# Patient Record
Sex: FEMALE | Race: BLACK OR AFRICAN AMERICAN | Employment: OTHER | URBAN - METROPOLITAN AREA
[De-identification: names, ages, dates, MRNs, and addresses within clinical notes are randomized per-mention and may not be internally consistent; named-entity substitution may affect disease eponyms.]

---

## 2020-01-01 ENCOUNTER — ANESTHESIA (OUTPATIENT)
Dept: SURGERY | Age: 75
DRG: 823 | End: 2020-01-01
Payer: MEDICARE

## 2020-01-01 ENCOUNTER — APPOINTMENT (OUTPATIENT)
Dept: GENERAL RADIOLOGY | Age: 75
DRG: 823 | End: 2020-01-01
Attending: INTERNAL MEDICINE
Payer: MEDICARE

## 2020-01-01 ENCOUNTER — APPOINTMENT (OUTPATIENT)
Dept: GENERAL RADIOLOGY | Age: 75
DRG: 823 | End: 2020-01-01
Attending: HOSPITALIST
Payer: MEDICARE

## 2020-01-01 ENCOUNTER — HOSPITAL ENCOUNTER (INPATIENT)
Age: 75
LOS: 10 days | DRG: 823 | End: 2020-11-09
Attending: EMERGENCY MEDICINE | Admitting: FAMILY MEDICINE
Payer: MEDICARE

## 2020-01-01 ENCOUNTER — APPOINTMENT (OUTPATIENT)
Dept: GENERAL RADIOLOGY | Age: 75
DRG: 823 | End: 2020-01-01
Payer: MEDICARE

## 2020-01-01 ENCOUNTER — APPOINTMENT (OUTPATIENT)
Dept: GENERAL RADIOLOGY | Age: 75
DRG: 823 | End: 2020-01-01
Attending: STUDENT IN AN ORGANIZED HEALTH CARE EDUCATION/TRAINING PROGRAM
Payer: MEDICARE

## 2020-01-01 ENCOUNTER — APPOINTMENT (OUTPATIENT)
Dept: GENERAL RADIOLOGY | Age: 75
DRG: 823 | End: 2020-01-01
Attending: ANESTHESIOLOGY
Payer: MEDICARE

## 2020-01-01 ENCOUNTER — APPOINTMENT (OUTPATIENT)
Dept: GENERAL RADIOLOGY | Age: 75
DRG: 823 | End: 2020-01-01
Attending: EMERGENCY MEDICINE
Payer: MEDICARE

## 2020-01-01 ENCOUNTER — APPOINTMENT (OUTPATIENT)
Dept: NON INVASIVE DIAGNOSTICS | Age: 75
DRG: 823 | End: 2020-01-01
Attending: FAMILY MEDICINE
Payer: MEDICARE

## 2020-01-01 ENCOUNTER — ANESTHESIA EVENT (OUTPATIENT)
Dept: SURGERY | Age: 75
DRG: 823 | End: 2020-01-01
Payer: MEDICARE

## 2020-01-01 ENCOUNTER — APPOINTMENT (OUTPATIENT)
Dept: CT IMAGING | Age: 75
DRG: 823 | End: 2020-01-01
Attending: EMERGENCY MEDICINE
Payer: MEDICARE

## 2020-01-01 ENCOUNTER — DOCUMENTATION ONLY (OUTPATIENT)
Dept: ONCOLOGY | Age: 75
End: 2020-01-01

## 2020-01-01 ENCOUNTER — APPOINTMENT (OUTPATIENT)
Dept: GENERAL RADIOLOGY | Age: 75
DRG: 823 | End: 2020-01-01
Attending: THORACIC SURGERY (CARDIOTHORACIC VASCULAR SURGERY)
Payer: MEDICARE

## 2020-01-01 ENCOUNTER — APPOINTMENT (OUTPATIENT)
Dept: GENERAL RADIOLOGY | Age: 75
DRG: 823 | End: 2020-01-01
Attending: NURSE PRACTITIONER
Payer: MEDICARE

## 2020-01-01 VITALS
OXYGEN SATURATION: 96 % | DIASTOLIC BLOOD PRESSURE: 57 MMHG | HEART RATE: 115 BPM | TEMPERATURE: 97.6 F | SYSTOLIC BLOOD PRESSURE: 98 MMHG | HEIGHT: 60 IN | WEIGHT: 109.79 LBS | RESPIRATION RATE: 16 BRPM | BODY MASS INDEX: 21.55 KG/M2

## 2020-01-01 DIAGNOSIS — R06.02 SHORTNESS OF BREATH: ICD-10-CM

## 2020-01-01 DIAGNOSIS — R53.81 PHYSICAL DEBILITY: ICD-10-CM

## 2020-01-01 DIAGNOSIS — J90 PLEURAL EFFUSION: Primary | ICD-10-CM

## 2020-01-01 DIAGNOSIS — R63.0 ANOREXIA: ICD-10-CM

## 2020-01-01 DIAGNOSIS — C83.10 MANTLE CELL LYMPHOMA, UNSPECIFIED BODY REGION (HCC): ICD-10-CM

## 2020-01-01 DIAGNOSIS — R64 CACHEXIA (HCC): ICD-10-CM

## 2020-01-01 DIAGNOSIS — R09.02 HYPOXIA: ICD-10-CM

## 2020-01-01 DIAGNOSIS — D72.829 LEUKOCYTOSIS, UNSPECIFIED TYPE: ICD-10-CM

## 2020-01-01 DIAGNOSIS — D64.9 ANEMIA, UNSPECIFIED TYPE: ICD-10-CM

## 2020-01-01 DIAGNOSIS — R59.1 LYMPHADENOPATHY: ICD-10-CM

## 2020-01-01 LAB
ABO + RH BLD: NORMAL
ALBUMIN SERPL-MCNC: 1.6 G/DL (ref 3.5–5)
ALBUMIN SERPL-MCNC: 1.8 G/DL (ref 3.5–5)
ALBUMIN SERPL-MCNC: 1.8 G/DL (ref 3.5–5)
ALBUMIN SERPL-MCNC: 2.2 G/DL (ref 3.5–5)
ALBUMIN SERPL-MCNC: 2.3 G/DL (ref 3.5–5)
ALBUMIN SERPL-MCNC: 2.7 G/DL (ref 3.5–5)
ALBUMIN/GLOB SERPL: 0.6 {RATIO} (ref 1.1–2.2)
ALBUMIN/GLOB SERPL: 0.8 {RATIO} (ref 1.1–2.2)
ALP SERPL-CCNC: 105 U/L (ref 45–117)
ALP SERPL-CCNC: 122 U/L (ref 45–117)
ALP SERPL-CCNC: 81 U/L (ref 45–117)
ALP SERPL-CCNC: 83 U/L (ref 45–117)
ALP SERPL-CCNC: 87 U/L (ref 45–117)
ALP SERPL-CCNC: 87 U/L (ref 45–117)
ALT SERPL-CCNC: 10 U/L (ref 12–78)
ALT SERPL-CCNC: 12 U/L (ref 12–78)
ALT SERPL-CCNC: 14 U/L (ref 12–78)
ALT SERPL-CCNC: 14 U/L (ref 12–78)
ALT SERPL-CCNC: 15 U/L (ref 12–78)
ALT SERPL-CCNC: 9 U/L (ref 12–78)
ANION GAP SERPL CALC-SCNC: 2 MMOL/L (ref 5–15)
ANION GAP SERPL CALC-SCNC: 4 MMOL/L (ref 5–15)
ANION GAP SERPL CALC-SCNC: 4 MMOL/L (ref 5–15)
ANION GAP SERPL CALC-SCNC: 6 MMOL/L (ref 5–15)
ANION GAP SERPL CALC-SCNC: 7 MMOL/L (ref 5–15)
ANION GAP SERPL CALC-SCNC: 9 MMOL/L (ref 5–15)
APTT PPP: 34.4 SEC (ref 22.1–32)
AST SERPL-CCNC: 116 U/L (ref 15–37)
AST SERPL-CCNC: 25 U/L (ref 15–37)
AST SERPL-CCNC: 26 U/L (ref 15–37)
AST SERPL-CCNC: 85 U/L (ref 15–37)
ATRIAL RATE: 108 BPM
BACTERIA SPEC CULT: NORMAL
BASOPHILS # BLD: 0 K/UL (ref 0–0.1)
BASOPHILS # BLD: NORMAL K/UL
BASOPHILS NFR BLD: 0 % (ref 0–1)
BASOPHILS NFR BLD: NORMAL % (ref 0–1)
BILIRUB SERPL-MCNC: 0.3 MG/DL (ref 0.2–1)
BILIRUB SERPL-MCNC: 0.3 MG/DL (ref 0.2–1)
BILIRUB SERPL-MCNC: 0.4 MG/DL (ref 0.2–1)
BILIRUB SERPL-MCNC: 0.5 MG/DL (ref 0.2–1)
BILIRUB SERPL-MCNC: 0.6 MG/DL (ref 0.2–1)
BILIRUB SERPL-MCNC: 0.7 MG/DL (ref 0.2–1)
BLD PROD TYP BPU: NORMAL
BLOOD GROUP ANTIBODIES SERPL: NORMAL
BNP SERPL-MCNC: 302 PG/ML
BPU ID: NORMAL
BUN SERPL-MCNC: 19 MG/DL (ref 6–20)
BUN SERPL-MCNC: 22 MG/DL (ref 6–20)
BUN SERPL-MCNC: 24 MG/DL (ref 6–20)
BUN SERPL-MCNC: 26 MG/DL (ref 6–20)
BUN SERPL-MCNC: 29 MG/DL (ref 6–20)
BUN SERPL-MCNC: 38 MG/DL (ref 6–20)
BUN SERPL-MCNC: 38 MG/DL (ref 6–20)
BUN SERPL-MCNC: 39 MG/DL (ref 6–20)
BUN/CREAT SERPL: 26 (ref 12–20)
BUN/CREAT SERPL: 33 (ref 12–20)
BUN/CREAT SERPL: 35 (ref 12–20)
BUN/CREAT SERPL: 35 (ref 12–20)
BUN/CREAT SERPL: 36 (ref 12–20)
BUN/CREAT SERPL: 37 (ref 12–20)
BUN/CREAT SERPL: 40 (ref 12–20)
BUN/CREAT SERPL: 55 (ref 12–20)
BUN/CREAT SERPL: 64 (ref 12–20)
BUN/CREAT SERPL: 67 (ref 12–20)
CALCIUM SERPL-MCNC: 6.8 MG/DL (ref 8.5–10.1)
CALCIUM SERPL-MCNC: 6.9 MG/DL (ref 8.5–10.1)
CALCIUM SERPL-MCNC: 7.1 MG/DL (ref 8.5–10.1)
CALCIUM SERPL-MCNC: 7.3 MG/DL (ref 8.5–10.1)
CALCIUM SERPL-MCNC: 7.3 MG/DL (ref 8.5–10.1)
CALCIUM SERPL-MCNC: 7.4 MG/DL (ref 8.5–10.1)
CALCIUM SERPL-MCNC: 7.5 MG/DL (ref 8.5–10.1)
CALCIUM SERPL-MCNC: 7.5 MG/DL (ref 8.5–10.1)
CALCIUM SERPL-MCNC: 8 MG/DL (ref 8.5–10.1)
CALCIUM SERPL-MCNC: 8.5 MG/DL (ref 8.5–10.1)
CALCULATED P AXIS, ECG09: 50 DEGREES
CALCULATED R AXIS, ECG10: 49 DEGREES
CALCULATED T AXIS, ECG11: 49 DEGREES
CHLORIDE SERPL-SCNC: 106 MMOL/L (ref 97–108)
CHLORIDE SERPL-SCNC: 111 MMOL/L (ref 97–108)
CHLORIDE SERPL-SCNC: 113 MMOL/L (ref 97–108)
CHLORIDE SERPL-SCNC: 113 MMOL/L (ref 97–108)
CHLORIDE SERPL-SCNC: 114 MMOL/L (ref 97–108)
CHLORIDE SERPL-SCNC: 114 MMOL/L (ref 97–108)
CHLORIDE SERPL-SCNC: 115 MMOL/L (ref 97–108)
CHLORIDE SERPL-SCNC: 115 MMOL/L (ref 97–108)
CHLORIDE SERPL-SCNC: 118 MMOL/L (ref 97–108)
CHLORIDE SERPL-SCNC: 118 MMOL/L (ref 97–108)
CO2 SERPL-SCNC: 22 MMOL/L (ref 21–32)
CO2 SERPL-SCNC: 23 MMOL/L (ref 21–32)
CO2 SERPL-SCNC: 25 MMOL/L (ref 21–32)
CO2 SERPL-SCNC: 26 MMOL/L (ref 21–32)
CO2 SERPL-SCNC: 27 MMOL/L (ref 21–32)
CO2 SERPL-SCNC: 27 MMOL/L (ref 21–32)
CO2 SERPL-SCNC: 28 MMOL/L (ref 21–32)
CO2 SERPL-SCNC: 30 MMOL/L (ref 21–32)
COMMENT, HOLDF: NORMAL
COMMENT, HOLDF: NORMAL
CORTIS SERPL-MCNC: 37.9 UG/DL
CREAT SERPL-MCNC: 0.57 MG/DL (ref 0.55–1.02)
CREAT SERPL-MCNC: 0.59 MG/DL (ref 0.55–1.02)
CREAT SERPL-MCNC: 0.61 MG/DL (ref 0.55–1.02)
CREAT SERPL-MCNC: 0.62 MG/DL (ref 0.55–1.02)
CREAT SERPL-MCNC: 0.66 MG/DL (ref 0.55–1.02)
CREAT SERPL-MCNC: 0.66 MG/DL (ref 0.55–1.02)
CREAT SERPL-MCNC: 0.69 MG/DL (ref 0.55–1.02)
CREAT SERPL-MCNC: 0.72 MG/DL (ref 0.55–1.02)
CREAT SERPL-MCNC: 0.72 MG/DL (ref 0.55–1.02)
CREAT SERPL-MCNC: 0.75 MG/DL (ref 0.55–1.02)
CROSSMATCH RESULT,%XM: NORMAL
DIAGNOSIS, 93000: NORMAL
DIFFERENTIAL METHOD BLD: ABNORMAL
DIFFERENTIAL METHOD BLD: NORMAL
ECHO AO ROOT DIAM: 2.35 CM
ECHO AV AREA PEAK VELOCITY: 1.14 CM2
ECHO AV PEAK GRADIENT: 10.15 MMHG
ECHO AV PEAK VELOCITY: 159.27 CM/S
ECHO EST RA PRESSURE: 12 MMHG
ECHO IVC PROX: 1.57 CM
ECHO LA AREA 4C: 24.46 CM2
ECHO LA MAJOR AXIS: 3.66 CM
ECHO LA VOL 2C: 46.23 ML (ref 22–52)
ECHO LA VOL 4C: 74.51 ML (ref 22–52)
ECHO LA VOL BP: 65.41 ML (ref 22–52)
ECHO LV EDV A2C: 73.54 ML
ECHO LV EDV A4C: 55.81 ML
ECHO LV EDV BP: 69.56 ML (ref 56–104)
ECHO LV EJECTION FRACTION A2C: 50 PERCENT
ECHO LV EJECTION FRACTION A4C: 54 PERCENT
ECHO LV EJECTION FRACTION BIPLANE: 49.2 PERCENT (ref 55–100)
ECHO LV ESV A2C: 36.63 ML
ECHO LV ESV A4C: 25.41 ML
ECHO LV ESV BP: 35.33 ML (ref 19–49)
ECHO LV INTERNAL DIMENSION DIASTOLIC: 4.77 CM (ref 3.9–5.3)
ECHO LV INTERNAL DIMENSION SYSTOLIC: 3.31 CM
ECHO LV IVSD: 0.6 CM (ref 0.6–0.9)
ECHO LV MASS 2D: 85.6 G (ref 67–162)
ECHO LV POSTERIOR WALL DIASTOLIC: 0.58 CM (ref 0.6–0.9)
ECHO LVOT DIAM: 1.44 CM
ECHO LVOT PEAK GRADIENT: 5.08 MMHG
ECHO LVOT PEAK VELOCITY: 112.67 CM/S
ECHO MV A VELOCITY: 107.2 CM/S
ECHO MV AREA PHT: 3.94 CM2
ECHO MV E DECELERATION TIME (DT): 192.34 MS
ECHO MV E VELOCITY: 77.65 CM/S
ECHO MV E/A RATIO: 0.72
ECHO MV PRESSURE HALF TIME (PHT): 55.78 MS
ECHO PV PEAK INSTANTANEOUS GRADIENT SYSTOLIC: 3.05 MMHG
ECHO RIGHT VENTRICULAR SYSTOLIC PRESSURE (RVSP): 70.2 MMHG
ECHO RV INTERNAL DIMENSION: 3.27 CM
ECHO RV TAPSE: 1.65 CM (ref 1.5–2)
ECHO TV REGURGITANT MAX VELOCITY: 381.46 CM/S
ECHO TV REGURGITANT PEAK GRADIENT: 58.2 MMHG
EOSINOPHIL # BLD: 0 K/UL (ref 0–0.4)
EOSINOPHIL # BLD: NORMAL K/UL
EOSINOPHIL NFR BLD: 0 % (ref 0–7)
EOSINOPHIL NFR BLD: NORMAL % (ref 0–5)
ERYTHROCYTE [DISTWIDTH] IN BLOOD BY AUTOMATED COUNT: 18.5 % (ref 11.5–14.5)
ERYTHROCYTE [DISTWIDTH] IN BLOOD BY AUTOMATED COUNT: 18.7 % (ref 11.5–14.5)
ERYTHROCYTE [DISTWIDTH] IN BLOOD BY AUTOMATED COUNT: 19 % (ref 11.5–14.5)
ERYTHROCYTE [DISTWIDTH] IN BLOOD BY AUTOMATED COUNT: 19.2 % (ref 11.5–14.5)
ERYTHROCYTE [DISTWIDTH] IN BLOOD BY AUTOMATED COUNT: 19.2 % (ref 11.5–14.5)
ERYTHROCYTE [DISTWIDTH] IN BLOOD BY AUTOMATED COUNT: 19.4 % (ref 11.5–14.5)
ERYTHROCYTE [DISTWIDTH] IN BLOOD BY AUTOMATED COUNT: 19.7 % (ref 11.5–14.5)
ERYTHROCYTE [DISTWIDTH] IN BLOOD BY AUTOMATED COUNT: 19.8 % (ref 11.5–14.5)
ERYTHROCYTE [DISTWIDTH] IN BLOOD BY AUTOMATED COUNT: 19.9 % (ref 11.5–14.5)
ERYTHROCYTE [DISTWIDTH] IN BLOOD BY AUTOMATED COUNT: 20.8 % (ref 11.5–14.5)
ERYTHROCYTE [DISTWIDTH] IN BLOOD BY AUTOMATED COUNT: NORMAL % (ref 11.5–14.5)
FERRITIN SERPL-MCNC: 125 NG/ML (ref 8–252)
FIBRINOGEN PPP-MCNC: 220 MG/DL (ref 200–475)
FOLATE SERPL-MCNC: 15.5 NG/ML (ref 5–21)
GLOBULIN SER CALC-MCNC: 2.9 G/DL (ref 2–4)
GLOBULIN SER CALC-MCNC: 2.9 G/DL (ref 2–4)
GLOBULIN SER CALC-MCNC: 3.2 G/DL (ref 2–4)
GLOBULIN SER CALC-MCNC: 3.2 G/DL (ref 2–4)
GLOBULIN SER CALC-MCNC: 3.6 G/DL (ref 2–4)
GLOBULIN SER CALC-MCNC: 4.3 G/DL (ref 2–4)
GLUCOSE BLD STRIP.AUTO-MCNC: 102 MG/DL (ref 65–100)
GLUCOSE SERPL-MCNC: 106 MG/DL (ref 65–100)
GLUCOSE SERPL-MCNC: 115 MG/DL (ref 65–100)
GLUCOSE SERPL-MCNC: 125 MG/DL (ref 65–100)
GLUCOSE SERPL-MCNC: 136 MG/DL (ref 65–100)
GLUCOSE SERPL-MCNC: 65 MG/DL (ref 65–100)
GLUCOSE SERPL-MCNC: 73 MG/DL (ref 65–100)
GLUCOSE SERPL-MCNC: 83 MG/DL (ref 65–100)
GLUCOSE SERPL-MCNC: 84 MG/DL (ref 65–100)
GLUCOSE SERPL-MCNC: 86 MG/DL (ref 65–100)
GLUCOSE SERPL-MCNC: 93 MG/DL (ref 65–100)
HBV CORE AB SERPL QL IA: NEGATIVE
HBV SURFACE AB SER QL: REACTIVE
HBV SURFACE AB SER-ACNC: 117.1 MIU/ML
HBV SURFACE AG SER QL: <0.1 INDEX
HBV SURFACE AG SER QL: NEGATIVE
HCT VFR BLD AUTO: 23.6 % (ref 35–47)
HCT VFR BLD AUTO: 24.5 % (ref 35–47)
HCT VFR BLD AUTO: 25.6 % (ref 35–47)
HCT VFR BLD AUTO: 25.9 % (ref 35–47)
HCT VFR BLD AUTO: 27.6 % (ref 35–47)
HCT VFR BLD AUTO: 29.3 % (ref 35–47)
HCT VFR BLD AUTO: 30.1 % (ref 35–47)
HCT VFR BLD AUTO: 30.3 % (ref 35–47)
HCT VFR BLD AUTO: 31.2 % (ref 35–47)
HCT VFR BLD AUTO: 31.6 % (ref 35–47)
HCT VFR BLD AUTO: 31.6 % (ref 35–47)
HCT VFR BLD AUTO: 31.7 % (ref 35–47)
HCT VFR BLD AUTO: NORMAL %
HCV AB SERPL QL IA: NONREACTIVE
HCV COMMENT,HCGAC: NORMAL
HGB BLD-MCNC: 6.4 G/DL (ref 11.5–16)
HGB BLD-MCNC: 6.7 G/DL (ref 11.5–16)
HGB BLD-MCNC: 6.9 G/DL (ref 11.5–16)
HGB BLD-MCNC: 7.3 G/DL (ref 11.5–16)
HGB BLD-MCNC: 8.1 G/DL (ref 11.5–16)
HGB BLD-MCNC: 8.1 G/DL (ref 11.5–16)
HGB BLD-MCNC: 8.5 G/DL (ref 11.5–16)
HGB BLD-MCNC: 8.7 G/DL (ref 11.5–16)
HGB BLD-MCNC: 8.7 G/DL (ref 11.5–16)
HGB BLD-MCNC: 8.8 G/DL (ref 11.5–16)
HGB BLD-MCNC: 8.9 G/DL (ref 11.5–16)
HGB BLD-MCNC: 8.9 G/DL (ref 11.5–16)
HGB BLD-MCNC: NORMAL G/DL (ref 12–16)
HISTORY CHECKED?,CKHIST: NORMAL
IMM GRANULOCYTES # BLD AUTO: 0 K/UL
IMM GRANULOCYTES # BLD AUTO: NORMAL K/UL (ref 0–0.5)
IMM GRANULOCYTES NFR BLD AUTO: 0 %
IMM GRANULOCYTES NFR BLD AUTO: NORMAL % (ref 0–5)
INR PPP: 1.4 (ref 0.9–1.1)
IRON SATN MFR SERPL: 7 % (ref 20–50)
IRON SERPL-MCNC: 15 UG/DL (ref 35–150)
LACTATE SERPL-SCNC: 1.2 MMOL/L (ref 0.4–2)
LACTATE SERPL-SCNC: 1.4 MMOL/L (ref 0.4–2)
LACTATE SERPL-SCNC: 1.9 MMOL/L (ref 0.4–2)
LDH SERPL L TO P-CCNC: 321 U/L (ref 81–246)
LYMPHOCYTES # BLD: 11.4 K/UL (ref 0.8–3.5)
LYMPHOCYTES # BLD: 12.8 K/UL (ref 0.8–3.5)
LYMPHOCYTES # BLD: 13.9 K/UL (ref 0.8–3.5)
LYMPHOCYTES # BLD: 17.7 K/UL (ref 0.8–3.5)
LYMPHOCYTES # BLD: 18.3 K/UL (ref 0.8–3.5)
LYMPHOCYTES # BLD: 19.3 K/UL (ref 0.8–3.5)
LYMPHOCYTES # BLD: 2.6 K/UL (ref 0.8–3.5)
LYMPHOCYTES # BLD: 26.7 K/UL (ref 0.8–3.5)
LYMPHOCYTES # BLD: 27.3 K/UL (ref 0.8–3.5)
LYMPHOCYTES # BLD: 36.3 K/UL (ref 0.8–3.5)
LYMPHOCYTES # BLD: 37.5 K/UL (ref 0.8–3.5)
LYMPHOCYTES # BLD: NORMAL K/UL
LYMPHOCYTES NFR BLD: 49 % (ref 12–49)
LYMPHOCYTES NFR BLD: 61 % (ref 12–49)
LYMPHOCYTES NFR BLD: 67 % (ref 12–49)
LYMPHOCYTES NFR BLD: 68 % (ref 12–49)
LYMPHOCYTES NFR BLD: 74 % (ref 12–49)
LYMPHOCYTES NFR BLD: 80 % (ref 12–49)
LYMPHOCYTES NFR BLD: 83 % (ref 12–49)
LYMPHOCYTES NFR BLD: 83 % (ref 12–49)
LYMPHOCYTES NFR BLD: 84 % (ref 12–49)
LYMPHOCYTES NFR BLD: 89 % (ref 12–49)
LYMPHOCYTES NFR BLD: 98 % (ref 12–49)
LYMPHOCYTES NFR BLD: NORMAL % (ref 12–49)
MAGNESIUM SERPL-MCNC: 1.9 MG/DL (ref 1.6–2.4)
MAGNESIUM SERPL-MCNC: 2 MG/DL (ref 1.6–2.4)
MAGNESIUM SERPL-MCNC: 2 MG/DL (ref 1.6–2.4)
MCH RBC QN AUTO: 22.9 PG (ref 26–34)
MCH RBC QN AUTO: 24 PG (ref 26–34)
MCH RBC QN AUTO: 24.4 PG (ref 26–34)
MCH RBC QN AUTO: 24.7 PG (ref 26–34)
MCH RBC QN AUTO: 24.8 PG (ref 26–34)
MCH RBC QN AUTO: 24.8 PG (ref 26–34)
MCH RBC QN AUTO: 24.9 PG (ref 26–34)
MCH RBC QN AUTO: 24.9 PG (ref 26–34)
MCH RBC QN AUTO: 25.1 PG (ref 26–34)
MCH RBC QN AUTO: NORMAL PG (ref 25–35)
MCHC RBC AUTO-ENTMCNC: 26.6 G/DL (ref 30–36.5)
MCHC RBC AUTO-ENTMCNC: 27.1 G/DL (ref 30–36.5)
MCHC RBC AUTO-ENTMCNC: 27.3 G/DL (ref 30–36.5)
MCHC RBC AUTO-ENTMCNC: 27.5 G/DL (ref 30–36.5)
MCHC RBC AUTO-ENTMCNC: 27.6 G/DL (ref 30–36.5)
MCHC RBC AUTO-ENTMCNC: 28.1 G/DL (ref 30–36.5)
MCHC RBC AUTO-ENTMCNC: 28.2 G/DL (ref 30–36.5)
MCHC RBC AUTO-ENTMCNC: 28.5 G/DL (ref 30–36.5)
MCHC RBC AUTO-ENTMCNC: 28.7 G/DL (ref 30–36.5)
MCHC RBC AUTO-ENTMCNC: 29.3 G/DL (ref 30–36.5)
MCHC RBC AUTO-ENTMCNC: NORMAL G/DL (ref 31–37)
MCV RBC AUTO: 84.1 FL (ref 80–99)
MCV RBC AUTO: 84.6 FL (ref 80–99)
MCV RBC AUTO: 86.8 FL (ref 80–99)
MCV RBC AUTO: 87.3 FL (ref 80–99)
MCV RBC AUTO: 87.6 FL (ref 80–99)
MCV RBC AUTO: 87.8 FL (ref 80–99)
MCV RBC AUTO: 87.8 FL (ref 80–99)
MCV RBC AUTO: 88 FL (ref 80–99)
MCV RBC AUTO: 88.3 FL (ref 80–99)
MCV RBC AUTO: 89.9 FL (ref 80–99)
MCV RBC AUTO: 90.3 FL (ref 80–99)
MCV RBC AUTO: 91.5 FL (ref 80–99)
MCV RBC AUTO: NORMAL FL (ref 78–102)
METAMYELOCYTES NFR BLD MANUAL: 1 %
MONOCYTES # BLD: 0 K/UL (ref 0–1)
MONOCYTES # BLD: 0 K/UL (ref 0–1)
MONOCYTES # BLD: 0.2 K/UL (ref 0–1)
MONOCYTES # BLD: 0.3 K/UL (ref 0–1)
MONOCYTES # BLD: 0.5 K/UL (ref 0–1)
MONOCYTES # BLD: 0.6 K/UL (ref 0–1)
MONOCYTES # BLD: 2.2 K/UL (ref 0–1)
MONOCYTES # BLD: 2.3 K/UL (ref 0–1)
MONOCYTES # BLD: NORMAL K/UL
MONOCYTES NFR BLD: 0 % (ref 5–13)
MONOCYTES NFR BLD: 0 % (ref 5–13)
MONOCYTES NFR BLD: 1 % (ref 5–13)
MONOCYTES NFR BLD: 2 % (ref 5–13)
MONOCYTES NFR BLD: 3 % (ref 5–13)
MONOCYTES NFR BLD: 3 % (ref 5–13)
MONOCYTES NFR BLD: 5 % (ref 5–13)
MONOCYTES NFR BLD: 5 % (ref 5–13)
MONOCYTES NFR BLD: NORMAL % (ref 5–13)
NEUTS BAND NFR BLD MANUAL: 1 % (ref 0–6)
NEUTS BAND NFR BLD MANUAL: 1 % (ref 0–6)
NEUTS BAND NFR BLD MANUAL: 2 % (ref 0–6)
NEUTS BAND NFR BLD MANUAL: 2 % (ref 0–6)
NEUTS BAND NFR BLD MANUAL: 3 % (ref 0–6)
NEUTS BAND NFR BLD MANUAL: 4 % (ref 0–6)
NEUTS SEG # BLD: 0.5 K/UL (ref 1.8–8)
NEUTS SEG # BLD: 2 K/UL (ref 1.8–8)
NEUTS SEG # BLD: 2.6 K/UL (ref 1.8–8)
NEUTS SEG # BLD: 3.7 K/UL (ref 1.8–8)
NEUTS SEG # BLD: 3.9 K/UL (ref 1.8–8)
NEUTS SEG # BLD: 5.3 K/UL (ref 1.8–8)
NEUTS SEG # BLD: 5.4 K/UL (ref 1.8–8)
NEUTS SEG # BLD: 6.4 K/UL (ref 1.8–8)
NEUTS SEG # BLD: 6.5 K/UL (ref 1.8–8)
NEUTS SEG # BLD: 7.6 K/UL (ref 1.8–8)
NEUTS SEG # BLD: 8 K/UL (ref 1.8–8)
NEUTS SEG # BLD: NORMAL K/UL
NEUTS SEG NFR BLD: 10 % (ref 32–75)
NEUTS SEG NFR BLD: 15 % (ref 32–75)
NEUTS SEG NFR BLD: 19 % (ref 32–75)
NEUTS SEG NFR BLD: 2 % (ref 32–75)
NEUTS SEG NFR BLD: 25 % (ref 32–75)
NEUTS SEG NFR BLD: 30 % (ref 32–75)
NEUTS SEG NFR BLD: 30 % (ref 32–75)
NEUTS SEG NFR BLD: 33 % (ref 32–75)
NEUTS SEG NFR BLD: 44 % (ref 32–75)
NEUTS SEG NFR BLD: NORMAL % (ref 42–75)
NRBC # BLD: 0.06 K/UL (ref 0–0.01)
NRBC # BLD: 0.08 K/UL (ref 0–0.01)
NRBC # BLD: 0.09 K/UL (ref 0–0.01)
NRBC # BLD: 0.09 K/UL (ref 0–0.01)
NRBC # BLD: 0.13 K/UL (ref 0–0.01)
NRBC # BLD: 0.19 K/UL (ref 0–0.01)
NRBC # BLD: 0.22 K/UL (ref 0–0.01)
NRBC # BLD: 0.23 K/UL (ref 0–0.01)
NRBC # BLD: 0.3 K/UL (ref 0–0.01)
NRBC # BLD: 0.46 K/UL (ref 0–0.01)
NRBC # BLD: 0.62 K/UL (ref 0–0.01)
NRBC # BLD: 0.66 K/UL (ref 0–0.01)
NRBC # BLD: NORMAL K/UL
NRBC BLD-RTO: 0.4 PER 100 WBC
NRBC BLD-RTO: 0.4 PER 100 WBC
NRBC BLD-RTO: 0.6 PER 100 WBC
NRBC BLD-RTO: 0.6 PER 100 WBC
NRBC BLD-RTO: 0.7 PER 100 WBC
NRBC BLD-RTO: 0.7 PER 100 WBC
NRBC BLD-RTO: 0.8 PER 100 WBC
NRBC BLD-RTO: 1.3 PER 100 WBC
NRBC BLD-RTO: 1.4 PER 100 WBC
NRBC BLD-RTO: 1.5 PER 100 WBC
NRBC BLD-RTO: 1.7 PER 100 WBC
NRBC BLD-RTO: 2.2 PER 100 WBC
NRBC BLD-RTO: NORMAL PER 100 WBC
P-R INTERVAL, ECG05: 124 MS
PATH REV BLD -IMP: ABNORMAL
PHOSPHATE SERPL-MCNC: 3 MG/DL (ref 2.6–4.7)
PHOSPHATE SERPL-MCNC: 3.7 MG/DL (ref 2.6–4.7)
PHOSPHATE SERPL-MCNC: 3.9 MG/DL (ref 2.6–4.7)
PLATELET # BLD AUTO: 26 K/UL (ref 150–400)
PLATELET # BLD AUTO: 29 K/UL (ref 150–400)
PLATELET # BLD AUTO: 31 K/UL (ref 150–400)
PLATELET # BLD AUTO: 33 K/UL (ref 150–400)
PLATELET # BLD AUTO: 40 K/UL (ref 150–400)
PLATELET # BLD AUTO: 45 K/UL (ref 150–400)
PLATELET # BLD AUTO: 55 K/UL (ref 150–400)
PLATELET # BLD AUTO: 56 K/UL (ref 150–400)
PLATELET # BLD AUTO: 58 K/UL (ref 150–400)
PLATELET # BLD AUTO: 6 K/UL (ref 150–400)
PLATELET # BLD AUTO: 68 K/UL (ref 150–400)
PLATELET # BLD AUTO: 69 K/UL (ref 150–400)
PLATELET # BLD AUTO: 69 K/UL (ref 150–400)
PLATELET # BLD AUTO: NORMAL K/UL
PMV BLD AUTO: 10 FL (ref 8.9–12.9)
PMV BLD AUTO: 10.2 FL (ref 8.9–12.9)
PMV BLD AUTO: 9.2 FL (ref 8.9–12.9)
PMV BLD AUTO: 9.3 FL (ref 8.9–12.9)
PMV BLD AUTO: 9.6 FL (ref 8.9–12.9)
PMV BLD AUTO: 9.7 FL (ref 8.9–12.9)
PMV BLD AUTO: 9.7 FL (ref 8.9–12.9)
PMV BLD AUTO: 9.8 FL (ref 8.9–12.9)
PMV BLD AUTO: 9.9 FL (ref 8.9–12.9)
PMV BLD AUTO: NORMAL FL (ref 7.4–10.4)
POTASSIUM SERPL-SCNC: 3.4 MMOL/L (ref 3.5–5.1)
POTASSIUM SERPL-SCNC: 3.5 MMOL/L (ref 3.5–5.1)
POTASSIUM SERPL-SCNC: 3.8 MMOL/L (ref 3.5–5.1)
POTASSIUM SERPL-SCNC: 3.9 MMOL/L (ref 3.5–5.1)
POTASSIUM SERPL-SCNC: 4.1 MMOL/L (ref 3.5–5.1)
POTASSIUM SERPL-SCNC: 4.5 MMOL/L (ref 3.5–5.1)
PROT SERPL-MCNC: 4.5 G/DL (ref 6.4–8.2)
PROT SERPL-MCNC: 5 G/DL (ref 6.4–8.2)
PROT SERPL-MCNC: 5 G/DL (ref 6.4–8.2)
PROT SERPL-MCNC: 5.2 G/DL (ref 6.4–8.2)
PROT SERPL-MCNC: 5.8 G/DL (ref 6.4–8.2)
PROT SERPL-MCNC: 7 G/DL (ref 6.4–8.2)
PROTHROMBIN TIME: 14.1 SEC (ref 9–11.1)
Q-T INTERVAL, ECG07: 298 MS
QRS DURATION, ECG06: 70 MS
QTC CALCULATION (BEZET), ECG08: 399 MS
RBC # BLD AUTO: 2.79 M/UL (ref 3.8–5.2)
RBC # BLD AUTO: 2.79 M/UL (ref 3.8–5.2)
RBC # BLD AUTO: 2.83 M/UL (ref 3.8–5.2)
RBC # BLD AUTO: 2.95 M/UL (ref 3.8–5.2)
RBC # BLD AUTO: 3.26 M/UL (ref 3.8–5.2)
RBC # BLD AUTO: 3.28 M/UL (ref 3.8–5.2)
RBC # BLD AUTO: 3.42 M/UL (ref 3.8–5.2)
RBC # BLD AUTO: 3.47 M/UL (ref 3.8–5.2)
RBC # BLD AUTO: 3.5 M/UL (ref 3.8–5.2)
RBC # BLD AUTO: 3.56 M/UL (ref 3.8–5.2)
RBC # BLD AUTO: 3.59 M/UL (ref 3.8–5.2)
RBC # BLD AUTO: 3.6 M/UL (ref 3.8–5.2)
RBC # BLD AUTO: NORMAL M/UL
RBC MORPH BLD: ABNORMAL
RETICS # AUTO: 0.17 M/UL (ref 0.02–0.08)
RETICS/RBC NFR AUTO: 4.6 % (ref 0.7–2.1)
SAMPLES BEING HELD,HOLD: NORMAL
SAMPLES BEING HELD,HOLD: NORMAL
SERVICE CMNT-IMP: ABNORMAL
SERVICE CMNT-IMP: NORMAL
SODIUM SERPL-SCNC: 142 MMOL/L (ref 136–145)
SODIUM SERPL-SCNC: 143 MMOL/L (ref 136–145)
SODIUM SERPL-SCNC: 144 MMOL/L (ref 136–145)
SODIUM SERPL-SCNC: 145 MMOL/L (ref 136–145)
SODIUM SERPL-SCNC: 146 MMOL/L (ref 136–145)
SODIUM SERPL-SCNC: 146 MMOL/L (ref 136–145)
SODIUM SERPL-SCNC: 148 MMOL/L (ref 136–145)
SODIUM SERPL-SCNC: 149 MMOL/L (ref 136–145)
SPECIMEN EXP DATE BLD: NORMAL
STATUS OF UNIT,%ST: NORMAL
THERAPEUTIC RANGE,PTTT: ABNORMAL SECS (ref 58–77)
TIBC SERPL-MCNC: 218 UG/DL (ref 250–450)
TROPONIN I SERPL-MCNC: <0.05 NG/ML
UNIT DIVISION, %UDIV: 0
URATE SERPL-MCNC: 4.5 MG/DL (ref 2.6–6)
URATE SERPL-MCNC: 5.1 MG/DL (ref 2.6–6)
URATE SERPL-MCNC: 5.2 MG/DL (ref 2.6–6)
VENTRICULAR RATE, ECG03: 108 BPM
VIT B12 SERPL-MCNC: 967 PG/ML (ref 193–986)
WBC # BLD AUTO: 13.2 K/UL (ref 3.6–11)
WBC # BLD AUTO: 15.5 K/UL (ref 3.6–11)
WBC # BLD AUTO: 19.9 K/UL (ref 3.6–11)
WBC # BLD AUTO: 20.7 K/UL (ref 3.6–11)
WBC # BLD AUTO: 21 K/UL (ref 3.6–11)
WBC # BLD AUTO: 23 K/UL (ref 3.6–11)
WBC # BLD AUTO: 26.8 K/UL (ref 3.6–11)
WBC # BLD AUTO: 27.2 K/UL (ref 3.6–11)
WBC # BLD AUTO: 34.1 K/UL (ref 3.6–11)
WBC # BLD AUTO: 43.8 K/UL (ref 3.6–11)
WBC # BLD AUTO: 45.2 K/UL (ref 3.6–11)
WBC # BLD AUTO: 5.4 K/UL (ref 3.6–11)
WBC # BLD AUTO: NORMAL K/UL
WBC MORPH BLD: ABNORMAL

## 2020-01-01 PROCEDURE — 74011000250 HC RX REV CODE- 250: Performed by: NURSE PRACTITIONER

## 2020-01-01 PROCEDURE — 85025 COMPLETE CBC W/AUTO DIFF WBC: CPT

## 2020-01-01 PROCEDURE — 76010000149 HC OR TIME 1 TO 1.5 HR: Performed by: THORACIC SURGERY (CARDIOTHORACIC VASCULAR SURGERY)

## 2020-01-01 PROCEDURE — 77030003666 HC NDL SPINAL BD -A: Performed by: THORACIC SURGERY (CARDIOTHORACIC VASCULAR SURGERY)

## 2020-01-01 PROCEDURE — 36415 COLL VENOUS BLD VENIPUNCTURE: CPT

## 2020-01-01 PROCEDURE — 82746 ASSAY OF FOLIC ACID SERUM: CPT

## 2020-01-01 PROCEDURE — 74011250637 HC RX REV CODE- 250/637: Performed by: NURSE PRACTITIONER

## 2020-01-01 PROCEDURE — 74011250636 HC RX REV CODE- 250/636: Performed by: INTERNAL MEDICINE

## 2020-01-01 PROCEDURE — 65660000000 HC RM CCU STEPDOWN

## 2020-01-01 PROCEDURE — 77030016151 HC PROTCTR LNS DFOG COVD -B: Performed by: THORACIC SURGERY (CARDIOTHORACIC VASCULAR SURGERY)

## 2020-01-01 PROCEDURE — 74011250636 HC RX REV CODE- 250/636: Performed by: FAMILY MEDICINE

## 2020-01-01 PROCEDURE — 96365 THER/PROPH/DIAG IV INF INIT: CPT

## 2020-01-01 PROCEDURE — 94640 AIRWAY INHALATION TREATMENT: CPT

## 2020-01-01 PROCEDURE — 87040 BLOOD CULTURE FOR BACTERIA: CPT

## 2020-01-01 PROCEDURE — 99024 POSTOP FOLLOW-UP VISIT: CPT | Performed by: PHYSICIAN ASSISTANT

## 2020-01-01 PROCEDURE — 77030002996 HC SUT SLK J&J -A: Performed by: THORACIC SURGERY (CARDIOTHORACIC VASCULAR SURGERY)

## 2020-01-01 PROCEDURE — 3E0L4GC INTRODUCTION OF OTHER THERAPEUTIC SUBSTANCE INTO PLEURAL CAVITY, PERCUTANEOUS ENDOSCOPIC APPROACH: ICD-10-PCS | Performed by: THORACIC SURGERY (CARDIOTHORACIC VASCULAR SURGERY)

## 2020-01-01 PROCEDURE — 82803 BLOOD GASES ANY COMBINATION: CPT

## 2020-01-01 PROCEDURE — 74011250636 HC RX REV CODE- 250/636: Performed by: STUDENT IN AN ORGANIZED HEALTH CARE EDUCATION/TRAINING PROGRAM

## 2020-01-01 PROCEDURE — 83605 ASSAY OF LACTIC ACID: CPT

## 2020-01-01 PROCEDURE — 80053 COMPREHEN METABOLIC PANEL: CPT

## 2020-01-01 PROCEDURE — 82607 VITAMIN B-12: CPT

## 2020-01-01 PROCEDURE — P9073 PLATELETS PHERESIS PATH REDU: HCPCS

## 2020-01-01 PROCEDURE — 74011000250 HC RX REV CODE- 250: Performed by: THORACIC SURGERY (CARDIOTHORACIC VASCULAR SURGERY)

## 2020-01-01 PROCEDURE — 84550 ASSAY OF BLOOD/URIC ACID: CPT

## 2020-01-01 PROCEDURE — 74011250636 HC RX REV CODE- 250/636

## 2020-01-01 PROCEDURE — 77010033678 HC OXYGEN DAILY

## 2020-01-01 PROCEDURE — 74011000258 HC RX REV CODE- 258: Performed by: FAMILY MEDICINE

## 2020-01-01 PROCEDURE — 74011250636 HC RX REV CODE- 250/636: Performed by: NURSE PRACTITIONER

## 2020-01-01 PROCEDURE — 86706 HEP B SURFACE ANTIBODY: CPT

## 2020-01-01 PROCEDURE — P9016 RBC LEUKOCYTES REDUCED: HCPCS

## 2020-01-01 PROCEDURE — 02HV33Z INSERTION OF INFUSION DEVICE INTO SUPERIOR VENA CAVA, PERCUTANEOUS APPROACH: ICD-10-PCS | Performed by: INTERNAL MEDICINE

## 2020-01-01 PROCEDURE — 0BH17EZ INSERTION OF ENDOTRACHEAL AIRWAY INTO TRACHEA, VIA NATURAL OR ARTIFICIAL OPENING: ICD-10-PCS | Performed by: INTERNAL MEDICINE

## 2020-01-01 PROCEDURE — 74011000258 HC RX REV CODE- 258: Performed by: NURSE PRACTITIONER

## 2020-01-01 PROCEDURE — 71045 X-RAY EXAM CHEST 1 VIEW: CPT

## 2020-01-01 PROCEDURE — P9035 PLATELET PHERES LEUKOREDUCED: HCPCS

## 2020-01-01 PROCEDURE — 85610 PROTHROMBIN TIME: CPT

## 2020-01-01 PROCEDURE — 74011250636 HC RX REV CODE- 250/636: Performed by: EMERGENCY MEDICINE

## 2020-01-01 PROCEDURE — 36591 DRAW BLOOD OFF VENOUS DEVICE: CPT

## 2020-01-01 PROCEDURE — 83735 ASSAY OF MAGNESIUM: CPT

## 2020-01-01 PROCEDURE — 74011250636 HC RX REV CODE- 250/636: Performed by: ANESTHESIOLOGY

## 2020-01-01 PROCEDURE — 65610000006 HC RM INTENSIVE CARE

## 2020-01-01 PROCEDURE — 92526 ORAL FUNCTION THERAPY: CPT

## 2020-01-01 PROCEDURE — 74011250636 HC RX REV CODE- 250/636: Performed by: NURSE ANESTHETIST, CERTIFIED REGISTERED

## 2020-01-01 PROCEDURE — P9045 ALBUMIN (HUMAN), 5%, 250 ML: HCPCS | Performed by: NURSE ANESTHETIST, CERTIFIED REGISTERED

## 2020-01-01 PROCEDURE — 36430 TRANSFUSION BLD/BLD COMPNT: CPT

## 2020-01-01 PROCEDURE — 2709999900 HC NON-CHARGEABLE SUPPLY

## 2020-01-01 PROCEDURE — 74018 RADEX ABDOMEN 1 VIEW: CPT

## 2020-01-01 PROCEDURE — 5A1945Z RESPIRATORY VENTILATION, 24-96 CONSECUTIVE HOURS: ICD-10-PCS | Performed by: INTERNAL MEDICINE

## 2020-01-01 PROCEDURE — 77030012390 HC DRN CHST BTL GTNG -B

## 2020-01-01 PROCEDURE — 86704 HEP B CORE ANTIBODY TOTAL: CPT

## 2020-01-01 PROCEDURE — 93306 TTE W/DOPPLER COMPLETE: CPT

## 2020-01-01 PROCEDURE — 82728 ASSAY OF FERRITIN: CPT

## 2020-01-01 PROCEDURE — 99222 1ST HOSP IP/OBS MODERATE 55: CPT | Performed by: NURSE PRACTITIONER

## 2020-01-01 PROCEDURE — 74011250637 HC RX REV CODE- 250/637: Performed by: ANESTHESIOLOGY

## 2020-01-01 PROCEDURE — 94664 DEMO&/EVAL PT USE INHALER: CPT

## 2020-01-01 PROCEDURE — 0D9670Z DRAINAGE OF STOMACH WITH DRAINAGE DEVICE, VIA NATURAL OR ARTIFICIAL OPENING: ICD-10-PCS | Performed by: INTERNAL MEDICINE

## 2020-01-01 PROCEDURE — 80048 BASIC METABOLIC PNL TOTAL CA: CPT

## 2020-01-01 PROCEDURE — 85027 COMPLETE CBC AUTOMATED: CPT

## 2020-01-01 PROCEDURE — 88342 IMHCHEM/IMCYTCHM 1ST ANTB: CPT

## 2020-01-01 PROCEDURE — 85730 THROMBOPLASTIN TIME PARTIAL: CPT

## 2020-01-01 PROCEDURE — 99024 POSTOP FOLLOW-UP VISIT: CPT | Performed by: NURSE PRACTITIONER

## 2020-01-01 PROCEDURE — 83615 LACTATE (LD) (LDH) ENZYME: CPT

## 2020-01-01 PROCEDURE — 88305 TISSUE EXAM BY PATHOLOGIST: CPT

## 2020-01-01 PROCEDURE — 36600 WITHDRAWAL OF ARTERIAL BLOOD: CPT

## 2020-01-01 PROCEDURE — 88360 TUMOR IMMUNOHISTOCHEM/MANUAL: CPT

## 2020-01-01 PROCEDURE — 87340 HEPATITIS B SURFACE AG IA: CPT

## 2020-01-01 PROCEDURE — 77030040361 HC SLV COMPR DVT MDII -B: Performed by: THORACIC SURGERY (CARDIOTHORACIC VASCULAR SURGERY)

## 2020-01-01 PROCEDURE — 88341 IMHCHEM/IMCYTCHM EA ADD ANTB: CPT

## 2020-01-01 PROCEDURE — 74011000258 HC RX REV CODE- 258: Performed by: RADIOLOGY

## 2020-01-01 PROCEDURE — 74011000258 HC RX REV CODE- 258: Performed by: INTERNAL MEDICINE

## 2020-01-01 PROCEDURE — 94003 VENT MGMT INPAT SUBQ DAY: CPT

## 2020-01-01 PROCEDURE — 94667 MNPJ CHEST WALL 1ST: CPT

## 2020-01-01 PROCEDURE — 86923 COMPATIBILITY TEST ELECTRIC: CPT

## 2020-01-01 PROCEDURE — 65270000032 HC RM SEMIPRIVATE

## 2020-01-01 PROCEDURE — 88112 CYTOPATH CELL ENHANCE TECH: CPT

## 2020-01-01 PROCEDURE — 84100 ASSAY OF PHOSPHORUS: CPT

## 2020-01-01 PROCEDURE — 77030040058 HC PWDR TALC INTPLR BMPI -C: Performed by: THORACIC SURGERY (CARDIOTHORACIC VASCULAR SURGERY)

## 2020-01-01 PROCEDURE — 93005 ELECTROCARDIOGRAM TRACING: CPT

## 2020-01-01 PROCEDURE — 99233 SBSQ HOSP IP/OBS HIGH 50: CPT | Performed by: NURSE PRACTITIONER

## 2020-01-01 PROCEDURE — 83540 ASSAY OF IRON: CPT

## 2020-01-01 PROCEDURE — 77030018673: Performed by: THORACIC SURGERY (CARDIOTHORACIC VASCULAR SURGERY)

## 2020-01-01 PROCEDURE — 86900 BLOOD TYPING SEROLOGIC ABO: CPT

## 2020-01-01 PROCEDURE — 77030040922 HC BLNKT HYPOTHRM STRY -A

## 2020-01-01 PROCEDURE — 99231 SBSQ HOSP IP/OBS SF/LOW 25: CPT | Performed by: NURSE PRACTITIONER

## 2020-01-01 PROCEDURE — 74011250637 HC RX REV CODE- 250/637: Performed by: INTERNAL MEDICINE

## 2020-01-01 PROCEDURE — 92610 EVALUATE SWALLOWING FUNCTION: CPT

## 2020-01-01 PROCEDURE — 83880 ASSAY OF NATRIURETIC PEPTIDE: CPT

## 2020-01-01 PROCEDURE — 82533 TOTAL CORTISOL: CPT

## 2020-01-01 PROCEDURE — 77030011264 HC ELECTRD BLD EXT COVD -A: Performed by: THORACIC SURGERY (CARDIOTHORACIC VASCULAR SURGERY)

## 2020-01-01 PROCEDURE — 92526 ORAL FUNCTION THERAPY: CPT | Performed by: SPEECH-LANGUAGE PATHOLOGIST

## 2020-01-01 PROCEDURE — 77030012390 HC DRN CHST BTL GTNG -B: Performed by: THORACIC SURGERY (CARDIOTHORACIC VASCULAR SURGERY)

## 2020-01-01 PROCEDURE — 74176 CT ABD & PELVIS W/O CONTRAST: CPT

## 2020-01-01 PROCEDURE — 85045 AUTOMATED RETICULOCYTE COUNT: CPT

## 2020-01-01 PROCEDURE — 82962 GLUCOSE BLOOD TEST: CPT

## 2020-01-01 PROCEDURE — P9045 ALBUMIN (HUMAN), 5%, 250 ML: HCPCS

## 2020-01-01 PROCEDURE — 84484 ASSAY OF TROPONIN QUANT: CPT

## 2020-01-01 PROCEDURE — 76060000034 HC ANESTHESIA 1.5 TO 2 HR: Performed by: THORACIC SURGERY (CARDIOTHORACIC VASCULAR SURGERY)

## 2020-01-01 PROCEDURE — 94660 CPAP INITIATION&MGMT: CPT

## 2020-01-01 PROCEDURE — 71275 CT ANGIOGRAPHY CHEST: CPT

## 2020-01-01 PROCEDURE — C1729 CATH, DRAINAGE: HCPCS | Performed by: THORACIC SURGERY (CARDIOTHORACIC VASCULAR SURGERY)

## 2020-01-01 PROCEDURE — 65270000029 HC RM PRIVATE

## 2020-01-01 PROCEDURE — 30233N1 TRANSFUSION OF NONAUTOLOGOUS RED BLOOD CELLS INTO PERIPHERAL VEIN, PERCUTANEOUS APPROACH: ICD-10-PCS | Performed by: EMERGENCY MEDICINE

## 2020-01-01 PROCEDURE — 94760 N-INVAS EAR/PLS OXIMETRY 1: CPT

## 2020-01-01 PROCEDURE — 94002 VENT MGMT INPAT INIT DAY: CPT

## 2020-01-01 PROCEDURE — 77030018798 HC PMP KT ENTRL FED COVD -A

## 2020-01-01 PROCEDURE — 77030004950 HC CATH ENTRL NG COVD -A

## 2020-01-01 PROCEDURE — 77030010507 HC ADH SKN DERMBND J&J -B: Performed by: THORACIC SURGERY (CARDIOTHORACIC VASCULAR SURGERY)

## 2020-01-01 PROCEDURE — 85049 AUTOMATED PLATELET COUNT: CPT

## 2020-01-01 PROCEDURE — 77030031139 HC SUT VCRL2 J&J -A: Performed by: THORACIC SURGERY (CARDIOTHORACIC VASCULAR SURGERY)

## 2020-01-01 PROCEDURE — 86803 HEPATITIS C AB TEST: CPT

## 2020-01-01 PROCEDURE — 30233R1 TRANSFUSION OF NONAUTOLOGOUS PLATELETS INTO PERIPHERAL VEIN, PERCUTANEOUS APPROACH: ICD-10-PCS | Performed by: INTERNAL MEDICINE

## 2020-01-01 PROCEDURE — 85384 FIBRINOGEN ACTIVITY: CPT

## 2020-01-01 PROCEDURE — 0BBP4ZX EXCISION OF LEFT PLEURA, PERCUTANEOUS ENDOSCOPIC APPROACH, DIAGNOSTIC: ICD-10-PCS | Performed by: THORACIC SURGERY (CARDIOTHORACIC VASCULAR SURGERY)

## 2020-01-01 PROCEDURE — 76210000016 HC OR PH I REC 1 TO 1.5 HR: Performed by: THORACIC SURGERY (CARDIOTHORACIC VASCULAR SURGERY)

## 2020-01-01 PROCEDURE — 74011000636 HC RX REV CODE- 636: Performed by: RADIOLOGY

## 2020-01-01 PROCEDURE — 74011000250 HC RX REV CODE- 250: Performed by: NURSE ANESTHETIST, CERTIFIED REGISTERED

## 2020-01-01 PROCEDURE — 2709999900 HC NON-CHARGEABLE SUPPLY: Performed by: THORACIC SURGERY (CARDIOTHORACIC VASCULAR SURGERY)

## 2020-01-01 PROCEDURE — 99285 EMERGENCY DEPT VISIT HI MDM: CPT

## 2020-01-01 RX ORDER — PROPOFOL 10 MG/ML
INJECTION, EMULSION INTRAVENOUS AS NEEDED
Status: DISCONTINUED | OUTPATIENT
Start: 2020-01-01 | End: 2020-01-01 | Stop reason: HOSPADM

## 2020-01-01 RX ORDER — PROPOFOL 10 MG/ML
0-50 VIAL (ML) INTRAVENOUS
Status: DISCONTINUED | OUTPATIENT
Start: 2020-01-01 | End: 2020-01-01

## 2020-01-01 RX ORDER — SODIUM CHLORIDE 0.9 % (FLUSH) 0.9 %
5-40 SYRINGE (ML) INJECTION AS NEEDED
Status: CANCELLED | OUTPATIENT
Start: 2020-01-01

## 2020-01-01 RX ORDER — MIDAZOLAM HYDROCHLORIDE 1 MG/ML
1 INJECTION, SOLUTION INTRAMUSCULAR; INTRAVENOUS AS NEEDED
Status: CANCELLED | OUTPATIENT
Start: 2020-01-01

## 2020-01-01 RX ORDER — SODIUM CHLORIDE, SODIUM LACTATE, POTASSIUM CHLORIDE, CALCIUM CHLORIDE 600; 310; 30; 20 MG/100ML; MG/100ML; MG/100ML; MG/100ML
125 INJECTION, SOLUTION INTRAVENOUS CONTINUOUS
Status: DISCONTINUED | OUTPATIENT
Start: 2020-01-01 | End: 2020-01-01

## 2020-01-01 RX ORDER — DEXTROSE MONOHYDRATE AND SODIUM CHLORIDE 5; .45 G/100ML; G/100ML
100 INJECTION, SOLUTION INTRAVENOUS CONTINUOUS
Status: DISCONTINUED | OUTPATIENT
Start: 2020-01-01 | End: 2020-01-01

## 2020-01-01 RX ORDER — DIPHENHYDRAMINE HYDROCHLORIDE 50 MG/ML
12.5 INJECTION, SOLUTION INTRAMUSCULAR; INTRAVENOUS AS NEEDED
Status: CANCELLED | OUTPATIENT
Start: 2020-01-01 | End: 2020-01-01

## 2020-01-01 RX ORDER — ALBUMIN HUMAN 50 G/1000ML
SOLUTION INTRAVENOUS
Status: COMPLETED
Start: 2020-01-01 | End: 2020-01-01

## 2020-01-01 RX ORDER — SODIUM CHLORIDE 9 MG/ML
25 INJECTION, SOLUTION INTRAVENOUS CONTINUOUS
Status: DISCONTINUED | OUTPATIENT
Start: 2020-01-01 | End: 2020-01-01

## 2020-01-01 RX ORDER — ACETAMINOPHEN 325 MG/1
650 TABLET ORAL ONCE
Status: COMPLETED | OUTPATIENT
Start: 2020-01-01 | End: 2020-01-01

## 2020-01-01 RX ORDER — SODIUM CHLORIDE 0.9 % (FLUSH) 0.9 %
5-40 SYRINGE (ML) INJECTION AS NEEDED
Status: DISCONTINUED | OUTPATIENT
Start: 2020-01-01 | End: 2020-01-01 | Stop reason: HOSPADM

## 2020-01-01 RX ORDER — SODIUM CHLORIDE 9 MG/ML
50 INJECTION, SOLUTION INTRAVENOUS CONTINUOUS
Status: CANCELLED | OUTPATIENT
Start: 2020-01-01 | End: 2020-01-01

## 2020-01-01 RX ORDER — NOREPINEPHRINE BITARTRATE/D5W 8 MG/250ML
.5-16 PLASTIC BAG, INJECTION (ML) INTRAVENOUS
Status: DISCONTINUED | OUTPATIENT
Start: 2020-01-01 | End: 2020-01-01

## 2020-01-01 RX ORDER — VANCOMYCIN HYDROCHLORIDE
1250 ONCE
Status: COMPLETED | OUTPATIENT
Start: 2020-01-01 | End: 2020-01-01

## 2020-01-01 RX ORDER — SODIUM CHLORIDE 0.9 % (FLUSH) 0.9 %
5-40 SYRINGE (ML) INJECTION EVERY 8 HOURS
Status: DISCONTINUED | OUTPATIENT
Start: 2020-01-01 | End: 2020-01-01 | Stop reason: HOSPADM

## 2020-01-01 RX ORDER — SODIUM CHLORIDE 9 MG/ML
1000 INJECTION, SOLUTION INTRAVENOUS CONTINUOUS
Status: CANCELLED | OUTPATIENT
Start: 2020-01-01

## 2020-01-01 RX ORDER — SODIUM CHLORIDE, SODIUM LACTATE, POTASSIUM CHLORIDE, CALCIUM CHLORIDE 600; 310; 30; 20 MG/100ML; MG/100ML; MG/100ML; MG/100ML
125 INJECTION, SOLUTION INTRAVENOUS CONTINUOUS
Status: DISCONTINUED | OUTPATIENT
Start: 2020-01-01 | End: 2020-01-01 | Stop reason: HOSPADM

## 2020-01-01 RX ORDER — DIPHENHYDRAMINE HYDROCHLORIDE 50 MG/ML
12.5 INJECTION, SOLUTION INTRAMUSCULAR; INTRAVENOUS AS NEEDED
Status: ACTIVE | OUTPATIENT
Start: 2020-01-01 | End: 2020-01-01

## 2020-01-01 RX ORDER — EPINEPHRINE 1 MG/ML
0.3 INJECTION, SOLUTION, CONCENTRATE INTRAVENOUS AS NEEDED
Status: CANCELLED | OUTPATIENT
Start: 2020-01-01

## 2020-01-01 RX ORDER — SODIUM CHLORIDE, SODIUM LACTATE, POTASSIUM CHLORIDE, CALCIUM CHLORIDE 600; 310; 30; 20 MG/100ML; MG/100ML; MG/100ML; MG/100ML
125 INJECTION, SOLUTION INTRAVENOUS CONTINUOUS
Status: CANCELLED | OUTPATIENT
Start: 2020-01-01

## 2020-01-01 RX ORDER — MORPHINE SULFATE 10 MG/ML
2 INJECTION, SOLUTION INTRAMUSCULAR; INTRAVENOUS
Status: CANCELLED | OUTPATIENT
Start: 2020-01-01

## 2020-01-01 RX ORDER — PALONOSETRON 0.05 MG/ML
0.25 INJECTION, SOLUTION INTRAVENOUS ONCE
Status: CANCELLED | OUTPATIENT
Start: 2020-01-01

## 2020-01-01 RX ORDER — EPHEDRINE SULFATE/0.9% NACL/PF 50 MG/5 ML
5 SYRINGE (ML) INTRAVENOUS AS NEEDED
Status: CANCELLED | OUTPATIENT
Start: 2020-01-01

## 2020-01-01 RX ORDER — BACITRACIN 500 UNIT/G
1 PACKET (EA) TOPICAL AS NEEDED
Status: DISCONTINUED | OUTPATIENT
Start: 2020-01-01 | End: 2020-01-01 | Stop reason: HOSPADM

## 2020-01-01 RX ORDER — POLYETHYLENE GLYCOL 3350 17 G/17G
17 POWDER, FOR SOLUTION ORAL DAILY
Status: DISCONTINUED | OUTPATIENT
Start: 2020-01-01 | End: 2020-01-01

## 2020-01-01 RX ORDER — FENTANYL CITRATE 50 UG/ML
25 INJECTION, SOLUTION INTRAMUSCULAR; INTRAVENOUS
Status: DISCONTINUED | OUTPATIENT
Start: 2020-01-01 | End: 2020-01-01 | Stop reason: HOSPADM

## 2020-01-01 RX ORDER — LORAZEPAM 2 MG/ML
1 INJECTION INTRAMUSCULAR
Status: DISCONTINUED | OUTPATIENT
Start: 2020-01-01 | End: 2020-01-01 | Stop reason: HOSPADM

## 2020-01-01 RX ORDER — ACETAMINOPHEN 325 MG/1
650 TABLET ORAL
Status: DISCONTINUED | OUTPATIENT
Start: 2020-01-01 | End: 2020-01-01 | Stop reason: HOSPADM

## 2020-01-01 RX ORDER — CEFAZOLIN SODIUM 1 G/3ML
INJECTION, POWDER, FOR SOLUTION INTRAMUSCULAR; INTRAVENOUS AS NEEDED
Status: DISCONTINUED | OUTPATIENT
Start: 2020-01-01 | End: 2020-01-01 | Stop reason: HOSPADM

## 2020-01-01 RX ORDER — MIDAZOLAM HYDROCHLORIDE 1 MG/ML
0.5 INJECTION, SOLUTION INTRAMUSCULAR; INTRAVENOUS
Status: CANCELLED | OUTPATIENT
Start: 2020-01-01

## 2020-01-01 RX ORDER — HEPARIN 100 UNIT/ML
300-500 SYRINGE INTRAVENOUS AS NEEDED
Status: CANCELLED
Start: 2020-01-01

## 2020-01-01 RX ORDER — POLYETHYLENE GLYCOL 3350 17 G/17G
17 POWDER, FOR SOLUTION ORAL DAILY PRN
Status: DISCONTINUED | OUTPATIENT
Start: 2020-01-01 | End: 2020-01-01

## 2020-01-01 RX ORDER — LIDOCAINE HYDROCHLORIDE 10 MG/ML
0.1 INJECTION, SOLUTION EPIDURAL; INFILTRATION; INTRACAUDAL; PERINEURAL AS NEEDED
Status: DISCONTINUED | OUTPATIENT
Start: 2020-01-01 | End: 2020-01-01 | Stop reason: HOSPADM

## 2020-01-01 RX ORDER — SODIUM CHLORIDE 450 MG/100ML
100 INJECTION, SOLUTION INTRAVENOUS CONTINUOUS
Status: DISCONTINUED | OUTPATIENT
Start: 2020-01-01 | End: 2020-01-01

## 2020-01-01 RX ORDER — SODIUM CHLORIDE 0.9 % (FLUSH) 0.9 %
10 SYRINGE (ML) INJECTION AS NEEDED
Status: CANCELLED | OUTPATIENT
Start: 2020-01-01

## 2020-01-01 RX ORDER — LIDOCAINE HYDROCHLORIDE 10 MG/ML
INJECTION INFILTRATION; PERINEURAL AS NEEDED
Status: DISCONTINUED | OUTPATIENT
Start: 2020-01-01 | End: 2020-01-01 | Stop reason: HOSPADM

## 2020-01-01 RX ORDER — ALBUMIN HUMAN 50 G/1000ML
12.5 SOLUTION INTRAVENOUS ONCE
Status: DISCONTINUED | OUTPATIENT
Start: 2020-01-01 | End: 2020-01-01 | Stop reason: HOSPADM

## 2020-01-01 RX ORDER — DIPHENHYDRAMINE HYDROCHLORIDE 50 MG/ML
50 INJECTION, SOLUTION INTRAMUSCULAR; INTRAVENOUS ONCE
Status: COMPLETED | OUTPATIENT
Start: 2020-01-01 | End: 2020-01-01

## 2020-01-01 RX ORDER — CEFAZOLIN SODIUM/WATER 2 G/20 ML
2 SYRINGE (ML) INTRAVENOUS
Status: DISCONTINUED | OUTPATIENT
Start: 2020-01-01 | End: 2020-01-01 | Stop reason: HOSPADM

## 2020-01-01 RX ORDER — MIDAZOLAM HYDROCHLORIDE 1 MG/ML
0.5 INJECTION, SOLUTION INTRAMUSCULAR; INTRAVENOUS
Status: DISCONTINUED | OUTPATIENT
Start: 2020-01-01 | End: 2020-01-01 | Stop reason: HOSPADM

## 2020-01-01 RX ORDER — SODIUM CHLORIDE 9 MG/ML
125 INJECTION, SOLUTION INTRAVENOUS CONTINUOUS
Status: DISCONTINUED | OUTPATIENT
Start: 2020-01-01 | End: 2020-01-01

## 2020-01-01 RX ORDER — ACETYLCYSTEINE 200 MG/ML
200 SOLUTION ORAL; RESPIRATORY (INHALATION)
Status: COMPLETED | OUTPATIENT
Start: 2020-01-01 | End: 2020-01-01

## 2020-01-01 RX ORDER — FERROUS SULFATE 300 MG/5ML
300 LIQUID (ML) ORAL
Status: DISCONTINUED | OUTPATIENT
Start: 2020-01-01 | End: 2020-01-01

## 2020-01-01 RX ORDER — FUROSEMIDE 10 MG/ML
20 INJECTION INTRAMUSCULAR; INTRAVENOUS ONCE
Status: COMPLETED | OUTPATIENT
Start: 2020-01-01 | End: 2020-01-01

## 2020-01-01 RX ORDER — ALBUTEROL SULFATE 5 MG/ML
2.5 SOLUTION RESPIRATORY (INHALATION) ONCE
Status: ACTIVE | OUTPATIENT
Start: 2020-01-01 | End: 2020-01-01

## 2020-01-01 RX ORDER — SODIUM CHLORIDE 9 MG/ML
25 INJECTION, SOLUTION INTRAVENOUS CONTINUOUS
Status: CANCELLED | OUTPATIENT
Start: 2020-01-01

## 2020-01-01 RX ORDER — MORPHINE SULFATE 2 MG/ML
INJECTION, SOLUTION INTRAMUSCULAR; INTRAVENOUS
Status: CANCELLED | OUTPATIENT
Start: 2020-01-01

## 2020-01-01 RX ORDER — ACETAMINOPHEN 325 MG/1
650 TABLET ORAL AS NEEDED
Status: CANCELLED
Start: 2020-01-01

## 2020-01-01 RX ORDER — SODIUM CHLORIDE 9 MG/ML
250 INJECTION, SOLUTION INTRAVENOUS AS NEEDED
Status: DISCONTINUED | OUTPATIENT
Start: 2020-01-01 | End: 2020-01-01 | Stop reason: ALTCHOICE

## 2020-01-01 RX ORDER — ACETAMINOPHEN 325 MG/1
650 TABLET ORAL ONCE
Status: DISCONTINUED | OUTPATIENT
Start: 2020-01-01 | End: 2020-01-01 | Stop reason: HOSPADM

## 2020-01-01 RX ORDER — FENTANYL CITRATE 50 UG/ML
50 INJECTION, SOLUTION INTRAMUSCULAR; INTRAVENOUS AS NEEDED
Status: CANCELLED | OUTPATIENT
Start: 2020-01-01

## 2020-01-01 RX ORDER — HYDROMORPHONE HYDROCHLORIDE 1 MG/ML
0.2 INJECTION, SOLUTION INTRAMUSCULAR; INTRAVENOUS; SUBCUTANEOUS
Status: DISCONTINUED | OUTPATIENT
Start: 2020-01-01 | End: 2020-01-01 | Stop reason: HOSPADM

## 2020-01-01 RX ORDER — CHLORHEXIDINE GLUCONATE 0.12 MG/ML
15 RINSE ORAL EVERY 12 HOURS
Status: DISCONTINUED | OUTPATIENT
Start: 2020-01-01 | End: 2020-01-01

## 2020-01-01 RX ORDER — HYDROCORTISONE SODIUM SUCCINATE 100 MG/2ML
100 INJECTION, POWDER, FOR SOLUTION INTRAMUSCULAR; INTRAVENOUS AS NEEDED
Status: CANCELLED | OUTPATIENT
Start: 2020-01-01

## 2020-01-01 RX ORDER — SODIUM CHLORIDE 9 MG/ML
250 INJECTION, SOLUTION INTRAVENOUS AS NEEDED
Status: DISCONTINUED | OUTPATIENT
Start: 2020-01-01 | End: 2020-01-01

## 2020-01-01 RX ORDER — DEXAMETHASONE SODIUM PHOSPHATE 100 MG/10ML
10 INJECTION INTRAMUSCULAR; INTRAVENOUS EVERY 24 HOURS
Status: CANCELLED | OUTPATIENT
Start: 2020-01-01

## 2020-01-01 RX ORDER — ALBUTEROL SULFATE 0.83 MG/ML
2.5 SOLUTION RESPIRATORY (INHALATION) AS NEEDED
Status: CANCELLED
Start: 2020-01-01

## 2020-01-01 RX ORDER — BUMETANIDE 0.25 MG/ML
1 INJECTION INTRAMUSCULAR; INTRAVENOUS ONCE
Status: COMPLETED | OUTPATIENT
Start: 2020-01-01 | End: 2020-01-01

## 2020-01-01 RX ORDER — FENTANYL CITRATE 50 UG/ML
25 INJECTION, SOLUTION INTRAMUSCULAR; INTRAVENOUS
Status: CANCELLED | OUTPATIENT
Start: 2020-01-01

## 2020-01-01 RX ORDER — OXYCODONE AND ACETAMINOPHEN 5; 325 MG/1; MG/1
1 TABLET ORAL AS NEEDED
Status: DISCONTINUED | OUTPATIENT
Start: 2020-01-01 | End: 2020-01-01 | Stop reason: HOSPADM

## 2020-01-01 RX ORDER — SODIUM CHLORIDE 9 MG/ML
10 INJECTION INTRAMUSCULAR; INTRAVENOUS; SUBCUTANEOUS AS NEEDED
Status: CANCELLED | OUTPATIENT
Start: 2020-01-01

## 2020-01-01 RX ORDER — FENTANYL CITRATE 50 UG/ML
INJECTION, SOLUTION INTRAMUSCULAR; INTRAVENOUS
Status: COMPLETED
Start: 2020-01-01 | End: 2020-01-01

## 2020-01-01 RX ORDER — BALSAM PERU/CASTOR OIL
OINTMENT (GRAM) TOPICAL 3 TIMES DAILY
Status: DISCONTINUED | OUTPATIENT
Start: 2020-01-01 | End: 2020-01-01

## 2020-01-01 RX ORDER — ALBUMIN HUMAN 50 G/1000ML
SOLUTION INTRAVENOUS AS NEEDED
Status: DISCONTINUED | OUTPATIENT
Start: 2020-01-01 | End: 2020-01-01 | Stop reason: HOSPADM

## 2020-01-01 RX ORDER — ACETAMINOPHEN 325 MG/1
650 TABLET ORAL ONCE
Status: CANCELLED
Start: 2020-01-01

## 2020-01-01 RX ORDER — DIPHENHYDRAMINE HYDROCHLORIDE 50 MG/ML
50 INJECTION, SOLUTION INTRAMUSCULAR; INTRAVENOUS ONCE
Status: CANCELLED
Start: 2020-01-01

## 2020-01-01 RX ORDER — OXYCODONE AND ACETAMINOPHEN 5; 325 MG/1; MG/1
1 TABLET ORAL AS NEEDED
Status: CANCELLED | OUTPATIENT
Start: 2020-01-01

## 2020-01-01 RX ORDER — ALLOPURINOL 100 MG/1
300 TABLET ORAL 2 TIMES DAILY
Status: DISCONTINUED | OUTPATIENT
Start: 2020-01-01 | End: 2020-01-01

## 2020-01-01 RX ORDER — FENTANYL CITRATE 50 UG/ML
25-50 INJECTION, SOLUTION INTRAMUSCULAR; INTRAVENOUS
Status: DISCONTINUED | OUTPATIENT
Start: 2020-01-01 | End: 2020-01-01 | Stop reason: SDUPTHER

## 2020-01-01 RX ORDER — MIDODRINE HYDROCHLORIDE 5 MG/1
5 TABLET ORAL
Status: DISCONTINUED | OUTPATIENT
Start: 2020-01-01 | End: 2020-01-01

## 2020-01-01 RX ORDER — ALBUMIN HUMAN 50 G/1000ML
12.5 SOLUTION INTRAVENOUS ONCE
Status: COMPLETED | OUTPATIENT
Start: 2020-01-01 | End: 2020-01-01

## 2020-01-01 RX ORDER — ONDANSETRON 2 MG/ML
4 INJECTION INTRAMUSCULAR; INTRAVENOUS AS NEEDED
Status: CANCELLED | OUTPATIENT
Start: 2020-01-01

## 2020-01-01 RX ORDER — ALBUTEROL SULFATE 5 MG/ML
2.5 SOLUTION RESPIRATORY (INHALATION) ONCE
Status: DISCONTINUED | OUTPATIENT
Start: 2020-01-01 | End: 2020-01-01

## 2020-01-01 RX ORDER — BUMETANIDE 0.25 MG/ML
2 INJECTION INTRAMUSCULAR; INTRAVENOUS ONCE
Status: COMPLETED | OUTPATIENT
Start: 2020-01-01 | End: 2020-01-01

## 2020-01-01 RX ORDER — MORPHINE SULFATE 10 MG/ML
2 INJECTION, SOLUTION INTRAMUSCULAR; INTRAVENOUS
Status: DISCONTINUED | OUTPATIENT
Start: 2020-01-01 | End: 2020-01-01 | Stop reason: HOSPADM

## 2020-01-01 RX ORDER — MIDAZOLAM HYDROCHLORIDE 1 MG/ML
1 INJECTION, SOLUTION INTRAMUSCULAR; INTRAVENOUS AS NEEDED
Status: DISCONTINUED | OUTPATIENT
Start: 2020-01-01 | End: 2020-01-01 | Stop reason: HOSPADM

## 2020-01-01 RX ORDER — ETOMIDATE 2 MG/ML
INJECTION INTRAVENOUS AS NEEDED
Status: DISCONTINUED | OUTPATIENT
Start: 2020-01-01 | End: 2020-01-01 | Stop reason: HOSPADM

## 2020-01-01 RX ORDER — LANOLIN ALCOHOL/MO/W.PET/CERES
1 CREAM (GRAM) TOPICAL
Status: DISCONTINUED | OUTPATIENT
Start: 2020-01-01 | End: 2020-01-01

## 2020-01-01 RX ORDER — SODIUM CHLORIDE, SODIUM LACTATE, POTASSIUM CHLORIDE, CALCIUM CHLORIDE 600; 310; 30; 20 MG/100ML; MG/100ML; MG/100ML; MG/100ML
125 INJECTION, SOLUTION INTRAVENOUS CONTINUOUS
Status: CANCELLED | OUTPATIENT
Start: 2020-01-01 | End: 2020-01-01

## 2020-01-01 RX ORDER — ACETAMINOPHEN 325 MG/1
650 TABLET ORAL ONCE
Status: CANCELLED | OUTPATIENT
Start: 2020-01-01 | End: 2020-01-01

## 2020-01-01 RX ORDER — MORPHINE SULFATE 2 MG/ML
1 INJECTION, SOLUTION INTRAMUSCULAR; INTRAVENOUS
Status: DISCONTINUED | OUTPATIENT
Start: 2020-01-01 | End: 2020-01-01 | Stop reason: HOSPADM

## 2020-01-01 RX ORDER — SUCCINYLCHOLINE CHLORIDE 20 MG/ML
INJECTION INTRAMUSCULAR; INTRAVENOUS AS NEEDED
Status: DISCONTINUED | OUTPATIENT
Start: 2020-01-01 | End: 2020-01-01 | Stop reason: HOSPADM

## 2020-01-01 RX ORDER — ROCURONIUM BROMIDE 10 MG/ML
INJECTION, SOLUTION INTRAVENOUS AS NEEDED
Status: DISCONTINUED | OUTPATIENT
Start: 2020-01-01 | End: 2020-01-01 | Stop reason: HOSPADM

## 2020-01-01 RX ORDER — DEXAMETHASONE SODIUM PHOSPHATE 4 MG/ML
10 INJECTION, SOLUTION INTRA-ARTICULAR; INTRALESIONAL; INTRAMUSCULAR; INTRAVENOUS; SOFT TISSUE EVERY 24 HOURS
Status: DISCONTINUED | OUTPATIENT
Start: 2020-01-01 | End: 2020-01-01

## 2020-01-01 RX ORDER — SODIUM CHLORIDE 0.9 % (FLUSH) 0.9 %
10 SYRINGE (ML) INJECTION
Status: COMPLETED | OUTPATIENT
Start: 2020-01-01 | End: 2020-01-01

## 2020-01-01 RX ORDER — HYDROMORPHONE HYDROCHLORIDE 1 MG/ML
0.2 INJECTION, SOLUTION INTRAMUSCULAR; INTRAVENOUS; SUBCUTANEOUS
Status: CANCELLED | OUTPATIENT
Start: 2020-01-01

## 2020-01-01 RX ORDER — MAGNESIUM SULFATE 1 G/100ML
1 INJECTION INTRAVENOUS ONCE
Status: COMPLETED | OUTPATIENT
Start: 2020-01-01 | End: 2020-01-01

## 2020-01-01 RX ORDER — SODIUM CHLORIDE 9 MG/ML
100 INJECTION, SOLUTION INTRAVENOUS CONTINUOUS
Status: DISCONTINUED | OUTPATIENT
Start: 2020-01-01 | End: 2020-01-01

## 2020-01-01 RX ORDER — ONDANSETRON 2 MG/ML
4 INJECTION INTRAMUSCULAR; INTRAVENOUS
Status: DISCONTINUED | OUTPATIENT
Start: 2020-01-01 | End: 2020-01-01 | Stop reason: HOSPADM

## 2020-01-01 RX ORDER — ACETAMINOPHEN 650 MG/1
650 SUPPOSITORY RECTAL
Status: DISCONTINUED | OUTPATIENT
Start: 2020-01-01 | End: 2020-01-01 | Stop reason: HOSPADM

## 2020-01-01 RX ORDER — SODIUM CHLORIDE, SODIUM LACTATE, POTASSIUM CHLORIDE, CALCIUM CHLORIDE 600; 310; 30; 20 MG/100ML; MG/100ML; MG/100ML; MG/100ML
INJECTION, SOLUTION INTRAVENOUS
Status: DISCONTINUED | OUTPATIENT
Start: 2020-01-01 | End: 2020-01-01 | Stop reason: HOSPADM

## 2020-01-01 RX ORDER — FENTANYL CITRATE 50 UG/ML
50 INJECTION, SOLUTION INTRAMUSCULAR; INTRAVENOUS AS NEEDED
Status: DISCONTINUED | OUTPATIENT
Start: 2020-01-01 | End: 2020-01-01 | Stop reason: HOSPADM

## 2020-01-01 RX ORDER — ALBUMIN HUMAN 50 G/1000ML
SOLUTION INTRAVENOUS
Status: DISPENSED
Start: 2020-01-01 | End: 2020-01-01

## 2020-01-01 RX ORDER — SODIUM CHLORIDE 0.9 % (FLUSH) 0.9 %
5-40 SYRINGE (ML) INJECTION EVERY 8 HOURS
Status: CANCELLED | OUTPATIENT
Start: 2020-01-01

## 2020-01-01 RX ORDER — DIPHENHYDRAMINE HYDROCHLORIDE 50 MG/ML
50 INJECTION, SOLUTION INTRAMUSCULAR; INTRAVENOUS AS NEEDED
Status: CANCELLED
Start: 2020-01-01

## 2020-01-01 RX ORDER — ONDANSETRON 2 MG/ML
4 INJECTION INTRAMUSCULAR; INTRAVENOUS EVERY 24 HOURS
Status: DISCONTINUED | OUTPATIENT
Start: 2020-01-01 | End: 2020-01-01

## 2020-01-01 RX ORDER — ONDANSETRON 2 MG/ML
8 INJECTION INTRAMUSCULAR; INTRAVENOUS EVERY 24 HOURS
Status: DISCONTINUED | OUTPATIENT
Start: 2020-01-01 | End: 2020-01-01

## 2020-01-01 RX ORDER — SODIUM CHLORIDE 9 MG/ML
25 INJECTION, SOLUTION INTRAVENOUS CONTINUOUS
Status: DISCONTINUED | OUTPATIENT
Start: 2020-01-01 | End: 2020-01-01 | Stop reason: HOSPADM

## 2020-01-01 RX ORDER — ONDANSETRON 2 MG/ML
8 INJECTION INTRAMUSCULAR; INTRAVENOUS AS NEEDED
Status: CANCELLED | OUTPATIENT
Start: 2020-01-01

## 2020-01-01 RX ORDER — LIDOCAINE HYDROCHLORIDE 10 MG/ML
0.1 INJECTION, SOLUTION EPIDURAL; INFILTRATION; INTRACAUDAL; PERINEURAL AS NEEDED
Status: CANCELLED | OUTPATIENT
Start: 2020-01-01

## 2020-01-01 RX ORDER — DIPHENHYDRAMINE HYDROCHLORIDE 50 MG/ML
25 INJECTION, SOLUTION INTRAMUSCULAR; INTRAVENOUS AS NEEDED
Status: CANCELLED
Start: 2020-01-01

## 2020-01-01 RX ORDER — ONDANSETRON 2 MG/ML
4 INJECTION INTRAMUSCULAR; INTRAVENOUS AS NEEDED
Status: DISCONTINUED | OUTPATIENT
Start: 2020-01-01 | End: 2020-01-01 | Stop reason: HOSPADM

## 2020-01-01 RX ORDER — LEVOFLOXACIN 5 MG/ML
750 INJECTION, SOLUTION INTRAVENOUS
Status: COMPLETED | OUTPATIENT
Start: 2020-01-01 | End: 2020-01-01

## 2020-01-01 RX ORDER — POTASSIUM CHLORIDE 7.45 MG/ML
10 INJECTION INTRAVENOUS
Status: COMPLETED | OUTPATIENT
Start: 2020-01-01 | End: 2020-01-01

## 2020-01-01 RX ORDER — PALONOSETRON 0.05 MG/ML
0.25 INJECTION, SOLUTION INTRAVENOUS ONCE
Status: DISCONTINUED | OUTPATIENT
Start: 2020-01-01 | End: 2020-01-01

## 2020-01-01 RX ORDER — BUPIVACAINE HYDROCHLORIDE 2.5 MG/ML
INJECTION, SOLUTION EPIDURAL; INFILTRATION; INTRACAUDAL AS NEEDED
Status: DISCONTINUED | OUTPATIENT
Start: 2020-01-01 | End: 2020-01-01 | Stop reason: HOSPADM

## 2020-01-01 RX ADMIN — Medication 10 ML: at 14:56

## 2020-01-01 RX ADMIN — PHENYLEPHRINE HYDROCHLORIDE 150 MCG/MIN: 10 INJECTION INTRAVENOUS at 01:40

## 2020-01-01 RX ADMIN — Medication: at 08:44

## 2020-01-01 RX ADMIN — Medication 10 ML: at 02:17

## 2020-01-01 RX ADMIN — FERROUS SULFATE TAB 325 MG (65 MG ELEMENTAL FE) 325 MG: 325 (65 FE) TAB at 08:52

## 2020-01-01 RX ADMIN — DEXTROSE MONOHYDRATE AND SODIUM CHLORIDE 100 ML/HR: 5; .45 INJECTION, SOLUTION INTRAVENOUS at 01:52

## 2020-01-01 RX ADMIN — PHENYLEPHRINE HYDROCHLORIDE 200 MCG/MIN: 10 INJECTION INTRAVENOUS at 05:15

## 2020-01-01 RX ADMIN — SODIUM CHLORIDE 100 ML/HR: 900 INJECTION, SOLUTION INTRAVENOUS at 21:20

## 2020-01-01 RX ADMIN — DEXTROSE MONOHYDRATE AND SODIUM CHLORIDE 100 ML/HR: 5; .45 INJECTION, SOLUTION INTRAVENOUS at 22:32

## 2020-01-01 RX ADMIN — FENTANYL CITRATE 25 MCG: 50 INJECTION, SOLUTION INTRAMUSCULAR; INTRAVENOUS at 21:16

## 2020-01-01 RX ADMIN — PIPERACILLIN AND TAZOBACTAM 3.38 G: 3; .375 INJECTION, POWDER, LYOPHILIZED, FOR SOLUTION INTRAVENOUS at 06:21

## 2020-01-01 RX ADMIN — DEXAMETHASONE SODIUM PHOSPHATE 10 MG: 4 INJECTION, SOLUTION INTRA-ARTICULAR; INTRALESIONAL; INTRAMUSCULAR; INTRAVENOUS; SOFT TISSUE at 13:17

## 2020-01-01 RX ADMIN — FAMOTIDINE 20 MG: 10 INJECTION, SOLUTION INTRAVENOUS at 03:09

## 2020-01-01 RX ADMIN — MINERAL OIL AND WHITE PETROLATUM: 150; 830 OINTMENT OPHTHALMIC at 00:05

## 2020-01-01 RX ADMIN — PHENYLEPHRINE HYDROCHLORIDE 200 MCG/MIN: 10 INJECTION INTRAVENOUS at 07:55

## 2020-01-01 RX ADMIN — Medication 10 ML: at 06:23

## 2020-01-01 RX ADMIN — Medication 10 ML: at 14:34

## 2020-01-01 RX ADMIN — MINERAL OIL AND WHITE PETROLATUM: 150; 830 OINTMENT OPHTHALMIC at 09:52

## 2020-01-01 RX ADMIN — FENTANYL CITRATE 50 MCG: 50 INJECTION, SOLUTION INTRAMUSCULAR; INTRAVENOUS at 04:10

## 2020-01-01 RX ADMIN — Medication 40 ML: at 06:38

## 2020-01-01 RX ADMIN — FENTANYL CITRATE 50 MCG: 50 INJECTION, SOLUTION INTRAMUSCULAR; INTRAVENOUS at 21:26

## 2020-01-01 RX ADMIN — Medication: at 22:00

## 2020-01-01 RX ADMIN — PIPERACILLIN AND TAZOBACTAM 3.38 G: 3; .375 INJECTION, POWDER, LYOPHILIZED, FOR SOLUTION INTRAVENOUS at 21:22

## 2020-01-01 RX ADMIN — ALLOPURINOL 300 MG: 100 TABLET ORAL at 19:20

## 2020-01-01 RX ADMIN — DEXTROSE MONOHYDRATE AND SODIUM CHLORIDE 100 ML/HR: 5; .45 INJECTION, SOLUTION INTRAVENOUS at 12:25

## 2020-01-01 RX ADMIN — PIPERACILLIN AND TAZOBACTAM 3.38 G: 3; .375 INJECTION, POWDER, LYOPHILIZED, FOR SOLUTION INTRAVENOUS at 20:18

## 2020-01-01 RX ADMIN — LORAZEPAM 1 MG: 2 INJECTION INTRAMUSCULAR; INTRAVENOUS at 04:15

## 2020-01-01 RX ADMIN — VANCOMYCIN HYDROCHLORIDE 1000 MG: 1 INJECTION, POWDER, LYOPHILIZED, FOR SOLUTION INTRAVENOUS at 00:03

## 2020-01-01 RX ADMIN — Medication 40 ML: at 22:00

## 2020-01-01 RX ADMIN — Medication 10 ML: at 14:20

## 2020-01-01 RX ADMIN — ETOMIDATE 8 MG: 2 INJECTION INTRAVENOUS at 17:54

## 2020-01-01 RX ADMIN — FENTANYL CITRATE 50 MCG: 50 INJECTION, SOLUTION INTRAMUSCULAR; INTRAVENOUS at 13:05

## 2020-01-01 RX ADMIN — Medication 10 ML: at 17:09

## 2020-01-01 RX ADMIN — MINERAL SUPPLEMENT IRON 300 MG / 5 ML STRENGTH LIQUID 100 PER BOX UNFLAVORED 300 MG: at 08:15

## 2020-01-01 RX ADMIN — VANCOMYCIN HYDROCHLORIDE 1000 MG: 1 INJECTION, POWDER, LYOPHILIZED, FOR SOLUTION INTRAVENOUS at 07:56

## 2020-01-01 RX ADMIN — Medication: at 23:21

## 2020-01-01 RX ADMIN — Medication 10 ML: at 13:22

## 2020-01-01 RX ADMIN — Medication 10 ML: at 13:50

## 2020-01-01 RX ADMIN — Medication: at 08:25

## 2020-01-01 RX ADMIN — FENTANYL CITRATE 50 MCG: 50 INJECTION, SOLUTION INTRAMUSCULAR; INTRAVENOUS at 02:57

## 2020-01-01 RX ADMIN — DEXTROSE MONOHYDRATE AND SODIUM CHLORIDE 100 ML/HR: 5; .45 INJECTION, SOLUTION INTRAVENOUS at 15:14

## 2020-01-01 RX ADMIN — MINERAL OIL AND WHITE PETROLATUM: 150; 830 OINTMENT OPHTHALMIC at 08:17

## 2020-01-01 RX ADMIN — DEXTROSE MONOHYDRATE AND SODIUM CHLORIDE 100 ML/HR: 5; .45 INJECTION, SOLUTION INTRAVENOUS at 07:05

## 2020-01-01 RX ADMIN — DEXTROSE MONOHYDRATE AND SODIUM CHLORIDE 100 ML/HR: 5; .45 INJECTION, SOLUTION INTRAVENOUS at 18:58

## 2020-01-01 RX ADMIN — MINERAL SUPPLEMENT IRON 300 MG / 5 ML STRENGTH LIQUID 100 PER BOX UNFLAVORED 300 MG: at 14:54

## 2020-01-01 RX ADMIN — MINERAL OIL AND WHITE PETROLATUM: 150; 830 OINTMENT OPHTHALMIC at 20:49

## 2020-01-01 RX ADMIN — VANCOMYCIN HYDROCHLORIDE 1250 MG: 100 INJECTION, POWDER, LYOPHILIZED, FOR SOLUTION INTRAVENOUS at 08:41

## 2020-01-01 RX ADMIN — POTASSIUM CHLORIDE 10 MEQ: 7.46 INJECTION, SOLUTION INTRAVENOUS at 10:32

## 2020-01-01 RX ADMIN — PROPOFOL 15 MCG/KG/MIN: 10 INJECTION, EMULSION INTRAVENOUS at 17:17

## 2020-01-01 RX ADMIN — DIPHENHYDRAMINE HYDROCHLORIDE 50 MG: 50 INJECTION, SOLUTION INTRAMUSCULAR; INTRAVENOUS at 14:52

## 2020-01-01 RX ADMIN — ALBUMIN (HUMAN) 250 ML: 12.5 INJECTION, SOLUTION INTRAVENOUS at 18:15

## 2020-01-01 RX ADMIN — MINERAL OIL AND WHITE PETROLATUM: 150; 830 OINTMENT OPHTHALMIC at 21:29

## 2020-01-01 RX ADMIN — FUROSEMIDE 20 MG: 10 INJECTION, SOLUTION INTRAMUSCULAR; INTRAVENOUS at 00:16

## 2020-01-01 RX ADMIN — PHENYLEPHRINE HYDROCHLORIDE 200 MCG/MIN: 10 INJECTION INTRAVENOUS at 02:43

## 2020-01-01 RX ADMIN — CALCIUM GLUCONATE 1 G: 98 INJECTION, SOLUTION INTRAVENOUS at 00:40

## 2020-01-01 RX ADMIN — Medication 10 ML: at 22:00

## 2020-01-01 RX ADMIN — SODIUM CHLORIDE, SODIUM LACTATE, POTASSIUM CHLORIDE, AND CALCIUM CHLORIDE 125 ML/HR: 600; 310; 30; 20 INJECTION, SOLUTION INTRAVENOUS at 20:00

## 2020-01-01 RX ADMIN — PIPERACILLIN AND TAZOBACTAM 3.38 G: 3; .375 INJECTION, POWDER, LYOPHILIZED, FOR SOLUTION INTRAVENOUS at 03:10

## 2020-01-01 RX ADMIN — PIPERACILLIN AND TAZOBACTAM 3.38 G: 3; .375 INJECTION, POWDER, LYOPHILIZED, FOR SOLUTION INTRAVENOUS at 04:45

## 2020-01-01 RX ADMIN — PIPERACILLIN AND TAZOBACTAM 3.38 G: 3; .375 INJECTION, POWDER, LYOPHILIZED, FOR SOLUTION INTRAVENOUS at 13:09

## 2020-01-01 RX ADMIN — PROPOFOL 20 MCG/KG/MIN: 10 INJECTION, EMULSION INTRAVENOUS at 20:04

## 2020-01-01 RX ADMIN — Medication 10 ML: at 06:00

## 2020-01-01 RX ADMIN — Medication 70 ML: at 05:25

## 2020-01-01 RX ADMIN — POTASSIUM CHLORIDE 10 MEQ: 7.46 INJECTION, SOLUTION INTRAVENOUS at 09:01

## 2020-01-01 RX ADMIN — PHENYLEPHRINE HYDROCHLORIDE 60 MCG/MIN: 10 INJECTION INTRAVENOUS at 21:00

## 2020-01-01 RX ADMIN — POTASSIUM BICARBONATE 20 MEQ: 782 TABLET, EFFERVESCENT ORAL at 05:18

## 2020-01-01 RX ADMIN — Medication 50 ML: at 21:57

## 2020-01-01 RX ADMIN — CHLORHEXIDINE GLUCONATE 15 ML: 0.12 RINSE ORAL at 08:07

## 2020-01-01 RX ADMIN — MINERAL OIL AND WHITE PETROLATUM: 150; 830 OINTMENT OPHTHALMIC at 08:45

## 2020-01-01 RX ADMIN — Medication 70 ML: at 21:27

## 2020-01-01 RX ADMIN — FAMOTIDINE 20 MG: 10 INJECTION, SOLUTION INTRAVENOUS at 16:37

## 2020-01-01 RX ADMIN — MINERAL OIL AND WHITE PETROLATUM: 150; 830 OINTMENT OPHTHALMIC at 21:00

## 2020-01-01 RX ADMIN — CEFAZOLIN 1 G: 330 INJECTION, POWDER, FOR SOLUTION INTRAMUSCULAR; INTRAVENOUS at 18:02

## 2020-01-01 RX ADMIN — SODIUM CHLORIDE 125 ML/HR: 900 INJECTION, SOLUTION INTRAVENOUS at 10:24

## 2020-01-01 RX ADMIN — Medication: at 22:04

## 2020-01-01 RX ADMIN — PIPERACILLIN AND TAZOBACTAM 3.38 G: 3; .375 INJECTION, POWDER, LYOPHILIZED, FOR SOLUTION INTRAVENOUS at 13:49

## 2020-01-01 RX ADMIN — MORPHINE SULFATE 1 MG: 2 INJECTION, SOLUTION INTRAMUSCULAR; INTRAVENOUS at 10:40

## 2020-01-01 RX ADMIN — PIPERACILLIN AND TAZOBACTAM 3.38 G: 3; .375 INJECTION, POWDER, LYOPHILIZED, FOR SOLUTION INTRAVENOUS at 20:48

## 2020-01-01 RX ADMIN — Medication 10 ML: at 13:04

## 2020-01-01 RX ADMIN — SODIUM CHLORIDE 125 ML/HR: 900 INJECTION, SOLUTION INTRAVENOUS at 06:22

## 2020-01-01 RX ADMIN — VANCOMYCIN HYDROCHLORIDE 1000 MG: 1 INJECTION, POWDER, LYOPHILIZED, FOR SOLUTION INTRAVENOUS at 00:05

## 2020-01-01 RX ADMIN — Medication 10 ML: at 15:37

## 2020-01-01 RX ADMIN — Medication 20 ML: at 04:34

## 2020-01-01 RX ADMIN — PIPERACILLIN AND TAZOBACTAM 3.38 G: 3; .375 INJECTION, POWDER, LYOPHILIZED, FOR SOLUTION INTRAVENOUS at 13:05

## 2020-01-01 RX ADMIN — ONDANSETRON 8 MG: 2 INJECTION INTRAMUSCULAR; INTRAVENOUS at 13:22

## 2020-01-01 RX ADMIN — PHENYLEPHRINE HYDROCHLORIDE 130 MCG/MIN: 10 INJECTION INTRAVENOUS at 03:43

## 2020-01-01 RX ADMIN — FENTANYL CITRATE 50 MCG: 50 INJECTION, SOLUTION INTRAMUSCULAR; INTRAVENOUS at 20:26

## 2020-01-01 RX ADMIN — FENTANYL CITRATE 50 MCG: 50 INJECTION, SOLUTION INTRAMUSCULAR; INTRAVENOUS at 01:49

## 2020-01-01 RX ADMIN — SUCCINYLCHOLINE CHLORIDE 120 MG: 20 INJECTION, SOLUTION INTRAMUSCULAR; INTRAVENOUS at 17:54

## 2020-01-01 RX ADMIN — ALLOPURINOL 300 MG: 100 TABLET ORAL at 09:16

## 2020-01-01 RX ADMIN — PIPERACILLIN AND TAZOBACTAM 3.38 G: 3; .375 INJECTION, POWDER, LYOPHILIZED, FOR SOLUTION INTRAVENOUS at 12:36

## 2020-01-01 RX ADMIN — PHENYLEPHRINE HYDROCHLORIDE 160 MCG/MIN: 10 INJECTION INTRAVENOUS at 12:00

## 2020-01-01 RX ADMIN — Medication: at 09:16

## 2020-01-01 RX ADMIN — ALBUMIN HUMAN 12.5 G: 50 SOLUTION INTRAVENOUS at 23:59

## 2020-01-01 RX ADMIN — FENTANYL CITRATE 25 MCG: 50 INJECTION, SOLUTION INTRAMUSCULAR; INTRAVENOUS at 14:47

## 2020-01-01 RX ADMIN — VANCOMYCIN HYDROCHLORIDE 1000 MG: 1 INJECTION, POWDER, LYOPHILIZED, FOR SOLUTION INTRAVENOUS at 16:37

## 2020-01-01 RX ADMIN — Medication: at 17:08

## 2020-01-01 RX ADMIN — BENDAMUSTINE HYDROCHLORIDE 132.5 MG: 25 INJECTION, SOLUTION INTRAVENOUS at 13:23

## 2020-01-01 RX ADMIN — PROPOFOL 20 MCG/KG/MIN: 10 INJECTION, EMULSION INTRAVENOUS at 00:39

## 2020-01-01 RX ADMIN — Medication 10 ML: at 13:07

## 2020-01-01 RX ADMIN — PIPERACILLIN AND TAZOBACTAM 3.38 G: 3; .375 INJECTION, POWDER, LYOPHILIZED, FOR SOLUTION INTRAVENOUS at 04:07

## 2020-01-01 RX ADMIN — ROCURONIUM BROMIDE 30 MG: 10 SOLUTION INTRAVENOUS at 18:53

## 2020-01-01 RX ADMIN — PIPERACILLIN AND TAZOBACTAM 3.38 G: 3; .375 INJECTION, POWDER, LYOPHILIZED, FOR SOLUTION INTRAVENOUS at 11:12

## 2020-01-01 RX ADMIN — PROPOFOL 25 MG: 10 INJECTION, EMULSION INTRAVENOUS at 18:53

## 2020-01-01 RX ADMIN — ROCURONIUM BROMIDE 30 MG: 10 SOLUTION INTRAVENOUS at 18:02

## 2020-01-01 RX ADMIN — PIPERACILLIN AND TAZOBACTAM 3.38 G: 3; .375 INJECTION, POWDER, LYOPHILIZED, FOR SOLUTION INTRAVENOUS at 12:03

## 2020-01-01 RX ADMIN — PHENYLEPHRINE HYDROCHLORIDE 100 MCG/MIN: 10 INJECTION INTRAVENOUS at 00:38

## 2020-01-01 RX ADMIN — MINERAL OIL AND WHITE PETROLATUM: 150; 830 OINTMENT OPHTHALMIC at 21:56

## 2020-01-01 RX ADMIN — PIPERACILLIN AND TAZOBACTAM 3.38 G: 3; .375 INJECTION, POWDER, LYOPHILIZED, FOR SOLUTION INTRAVENOUS at 03:28

## 2020-01-01 RX ADMIN — MIDODRINE HYDROCHLORIDE 5 MG: 5 TABLET ORAL at 16:37

## 2020-01-01 RX ADMIN — BUMETANIDE 1 MG: 0.25 INJECTION INTRAMUSCULAR; INTRAVENOUS at 16:38

## 2020-01-01 RX ADMIN — SODIUM CHLORIDE 100 ML/HR: 450 INJECTION, SOLUTION INTRAVENOUS at 18:53

## 2020-01-01 RX ADMIN — PHENYLEPHRINE HYDROCHLORIDE 150 MCG/MIN: 10 INJECTION INTRAVENOUS at 04:50

## 2020-01-01 RX ADMIN — PHENYLEPHRINE HYDROCHLORIDE 110 MCG/MIN: 10 INJECTION INTRAVENOUS at 05:41

## 2020-01-01 RX ADMIN — PHENYLEPHRINE HYDROCHLORIDE 180 MCG/MIN: 10 INJECTION INTRAVENOUS at 16:59

## 2020-01-01 RX ADMIN — FAMOTIDINE 20 MG: 10 INJECTION, SOLUTION INTRAVENOUS at 03:10

## 2020-01-01 RX ADMIN — PHENYLEPHRINE HYDROCHLORIDE 160 MCG/MIN: 10 INJECTION INTRAVENOUS at 08:08

## 2020-01-01 RX ADMIN — PHENYLEPHRINE HYDROCHLORIDE 175 MCG/MIN: 10 INJECTION INTRAVENOUS at 19:35

## 2020-01-01 RX ADMIN — MORPHINE SULFATE 1 MG: 2 INJECTION, SOLUTION INTRAMUSCULAR; INTRAVENOUS at 03:45

## 2020-01-01 RX ADMIN — PHENYLEPHRINE HYDROCHLORIDE 200 MCG/MIN: 10 INJECTION INTRAVENOUS at 20:03

## 2020-01-01 RX ADMIN — MIDODRINE HYDROCHLORIDE 5 MG: 5 TABLET ORAL at 13:50

## 2020-01-01 RX ADMIN — PIPERACILLIN AND TAZOBACTAM 3.38 G: 3; .375 INJECTION, POWDER, LYOPHILIZED, FOR SOLUTION INTRAVENOUS at 05:16

## 2020-01-01 RX ADMIN — MAGNESIUM SULFATE HEPTAHYDRATE 1 G: 1 INJECTION, SOLUTION INTRAVENOUS at 08:04

## 2020-01-01 RX ADMIN — MIDODRINE HYDROCHLORIDE 5 MG: 5 TABLET ORAL at 17:11

## 2020-01-01 RX ADMIN — IOPAMIDOL 65 ML: 755 INJECTION, SOLUTION INTRAVENOUS at 01:31

## 2020-01-01 RX ADMIN — Medication: at 21:28

## 2020-01-01 RX ADMIN — Medication: at 17:00

## 2020-01-01 RX ADMIN — PIPERACILLIN AND TAZOBACTAM 3.38 G: 3; .375 INJECTION, POWDER, LYOPHILIZED, FOR SOLUTION INTRAVENOUS at 14:19

## 2020-01-01 RX ADMIN — Medication: at 17:44

## 2020-01-01 RX ADMIN — SODIUM CHLORIDE, SODIUM LACTATE, POTASSIUM CHLORIDE, AND CALCIUM CHLORIDE 125 ML/HR: 600; 310; 30; 20 INJECTION, SOLUTION INTRAVENOUS at 06:57

## 2020-01-01 RX ADMIN — FERROUS SULFATE TAB 325 MG (65 MG ELEMENTAL FE) 325 MG: 325 (65 FE) TAB at 08:22

## 2020-01-01 RX ADMIN — PHENYLEPHRINE HYDROCHLORIDE 95 MCG/MIN: 10 INJECTION INTRAVENOUS at 11:30

## 2020-01-01 RX ADMIN — LEVOFLOXACIN 750 MG: 5 INJECTION, SOLUTION INTRAVENOUS at 02:34

## 2020-01-01 RX ADMIN — Medication 10 ML: at 00:19

## 2020-01-01 RX ADMIN — CHLORHEXIDINE GLUCONATE 15 ML: 0.12 RINSE ORAL at 08:17

## 2020-01-01 RX ADMIN — PHENYLEPHRINE HYDROCHLORIDE 200 MCG/MIN: 10 INJECTION INTRAVENOUS at 22:44

## 2020-01-01 RX ADMIN — CHLORHEXIDINE GLUCONATE 15 ML: 0.12 RINSE ORAL at 20:04

## 2020-01-01 RX ADMIN — SODIUM CHLORIDE 55 ML: 900 INJECTION, SOLUTION INTRAVENOUS at 02:17

## 2020-01-01 RX ADMIN — ACETAMINOPHEN 650 MG: 325 TABLET ORAL at 14:52

## 2020-01-01 RX ADMIN — Medication 40 ML: at 05:44

## 2020-01-01 RX ADMIN — DEXTROSE MONOHYDRATE AND SODIUM CHLORIDE 100 ML/HR: 5; .45 INJECTION, SOLUTION INTRAVENOUS at 07:59

## 2020-01-01 RX ADMIN — Medication 10 ML: at 21:29

## 2020-01-01 RX ADMIN — ACETYLCYSTEINE 200 MG: 200 SOLUTION ORAL; RESPIRATORY (INHALATION) at 15:47

## 2020-01-01 RX ADMIN — PHENYLEPHRINE HYDROCHLORIDE 100 MCG/MIN: 10 INJECTION INTRAVENOUS at 18:10

## 2020-01-01 RX ADMIN — PHENYLEPHRINE HYDROCHLORIDE 190 MCG/MIN: 10 INJECTION INTRAVENOUS at 10:43

## 2020-01-01 RX ADMIN — SODIUM CHLORIDE, SODIUM LACTATE, POTASSIUM CHLORIDE, AND CALCIUM CHLORIDE 1000 ML: 600; 310; 30; 20 INJECTION, SOLUTION INTRAVENOUS at 05:54

## 2020-01-01 RX ADMIN — MIDODRINE HYDROCHLORIDE 5 MG: 5 TABLET ORAL at 08:53

## 2020-01-01 RX ADMIN — MORPHINE SULFATE 1 MG: 2 INJECTION, SOLUTION INTRAMUSCULAR; INTRAVENOUS at 15:36

## 2020-01-01 RX ADMIN — MINERAL OIL AND WHITE PETROLATUM: 150; 830 OINTMENT OPHTHALMIC at 08:53

## 2020-01-01 RX ADMIN — SODIUM CHLORIDE 250 ML: 900 INJECTION, SOLUTION INTRAVENOUS at 05:04

## 2020-01-01 RX ADMIN — PHENYLEPHRINE HYDROCHLORIDE 100 MCG: 10 INJECTION INTRAVENOUS at 18:10

## 2020-01-01 RX ADMIN — PIPERACILLIN AND TAZOBACTAM 3.38 G: 3; .375 INJECTION, POWDER, LYOPHILIZED, FOR SOLUTION INTRAVENOUS at 19:36

## 2020-01-01 RX ADMIN — PIPERACILLIN AND TAZOBACTAM 3.38 G: 3; .375 INJECTION, POWDER, LYOPHILIZED, FOR SOLUTION INTRAVENOUS at 20:02

## 2020-01-01 RX ADMIN — PHENYLEPHRINE HYDROCHLORIDE 165 MCG/MIN: 10 INJECTION INTRAVENOUS at 22:30

## 2020-01-01 RX ADMIN — PHENYLEPHRINE HYDROCHLORIDE 200 MCG/MIN: 10 INJECTION INTRAVENOUS at 17:37

## 2020-01-01 RX ADMIN — SODIUM CHLORIDE 548 MG: 9 INJECTION, SOLUTION INTRAVENOUS at 15:42

## 2020-01-01 RX ADMIN — MIDODRINE HYDROCHLORIDE 5 MG: 5 TABLET ORAL at 17:55

## 2020-01-01 RX ADMIN — POLYETHYLENE GLYCOL 3350 17 G: 17 POWDER, FOR SOLUTION ORAL at 09:24

## 2020-01-01 RX ADMIN — MORPHINE SULFATE 1 MG: 2 INJECTION, SOLUTION INTRAMUSCULAR; INTRAVENOUS at 00:34

## 2020-01-01 RX ADMIN — PIPERACILLIN AND TAZOBACTAM 3.38 G: 3; .375 INJECTION, POWDER, LYOPHILIZED, FOR SOLUTION INTRAVENOUS at 20:42

## 2020-01-01 RX ADMIN — MIDODRINE HYDROCHLORIDE 5 MG: 5 TABLET ORAL at 14:53

## 2020-01-01 RX ADMIN — SODIUM CHLORIDE, POTASSIUM CHLORIDE, SODIUM LACTATE AND CALCIUM CHLORIDE: 600; 310; 30; 20 INJECTION, SOLUTION INTRAVENOUS at 17:49

## 2020-01-01 RX ADMIN — PHENYLEPHRINE HYDROCHLORIDE 200 MCG/MIN: 10 INJECTION INTRAVENOUS at 01:00

## 2020-01-01 RX ADMIN — MORPHINE SULFATE 1 MG: 2 INJECTION, SOLUTION INTRAMUSCULAR; INTRAVENOUS at 19:00

## 2020-01-01 RX ADMIN — CHLORHEXIDINE GLUCONATE 15 ML: 0.12 RINSE ORAL at 08:45

## 2020-01-01 RX ADMIN — PHENYLEPHRINE HYDROCHLORIDE 30 MCG/MIN: 10 INJECTION INTRAVENOUS at 20:02

## 2020-01-01 RX ADMIN — Medication: at 18:17

## 2020-01-01 RX ADMIN — PHENYLEPHRINE HYDROCHLORIDE 190 MCG/MIN: 10 INJECTION INTRAVENOUS at 14:25

## 2020-01-01 RX ADMIN — Medication 10 ML: at 10:14

## 2020-01-01 RX ADMIN — PHENYLEPHRINE HYDROCHLORIDE 200 MCG/MIN: 10 INJECTION INTRAVENOUS at 15:05

## 2020-01-01 RX ADMIN — FAMOTIDINE 20 MG: 10 INJECTION, SOLUTION INTRAVENOUS at 01:58

## 2020-01-01 RX ADMIN — BUMETANIDE 2 MG: 0.25 INJECTION INTRAMUSCULAR; INTRAVENOUS at 12:36

## 2020-01-01 RX ADMIN — Medication: at 17:11

## 2020-01-01 RX ADMIN — Medication: at 00:05

## 2020-01-01 RX ADMIN — ALLOPURINOL 300 MG: 100 TABLET ORAL at 17:55

## 2020-01-01 RX ADMIN — PIPERACILLIN AND TAZOBACTAM 3.38 G: 3; .375 INJECTION, POWDER, LYOPHILIZED, FOR SOLUTION INTRAVENOUS at 03:09

## 2020-01-01 RX ADMIN — SODIUM CHLORIDE 25 ML/HR: 900 INJECTION, SOLUTION INTRAVENOUS at 15:38

## 2020-01-01 RX ADMIN — SODIUM CHLORIDE 100 ML/HR: 900 INJECTION, SOLUTION INTRAVENOUS at 18:29

## 2020-01-01 RX ADMIN — ALBUMIN (HUMAN) 12.5 G: 12.5 INJECTION, SOLUTION INTRAVENOUS at 23:59

## 2020-01-01 RX ADMIN — DEXTROSE MONOHYDRATE AND SODIUM CHLORIDE 100 ML/HR: 5; .45 INJECTION, SOLUTION INTRAVENOUS at 17:14

## 2020-01-01 RX ADMIN — Medication: at 08:04

## 2020-01-01 RX ADMIN — FAMOTIDINE 20 MG: 10 INJECTION, SOLUTION INTRAVENOUS at 14:19

## 2020-01-01 RX ADMIN — SODIUM CHLORIDE 100 ML/HR: 900 INJECTION, SOLUTION INTRAVENOUS at 10:13

## 2020-01-01 RX ADMIN — PIPERACILLIN AND TAZOBACTAM 3.38 G: 3; .375 INJECTION, POWDER, LYOPHILIZED, FOR SOLUTION INTRAVENOUS at 19:50

## 2020-01-01 RX ADMIN — PROPOFOL 20 MCG/KG/MIN: 10 INJECTION, EMULSION INTRAVENOUS at 18:55

## 2020-01-01 RX ADMIN — FAMOTIDINE 20 MG: 10 INJECTION, SOLUTION INTRAVENOUS at 17:08

## 2020-10-30 PROBLEM — D64.9 ANEMIA: Status: ACTIVE | Noted: 2020-01-01

## 2020-10-30 PROBLEM — R00.0 TACHYCARDIA: Status: ACTIVE | Noted: 2020-01-01

## 2020-10-30 PROBLEM — D72.829 LEUKOCYTOSIS: Status: ACTIVE | Noted: 2020-01-01

## 2020-10-30 PROBLEM — J90 PLEURAL EFFUSION, LEFT: Status: ACTIVE | Noted: 2020-01-01

## 2020-10-30 PROBLEM — R65.10 SIRS (SYSTEMIC INFLAMMATORY RESPONSE SYNDROME) (HCC): Status: ACTIVE | Noted: 2020-01-01

## 2020-10-30 PROBLEM — D70.9 NEUTROPENIA (HCC): Status: ACTIVE | Noted: 2020-01-01

## 2020-10-30 PROBLEM — J96.01 ACUTE RESPIRATORY FAILURE WITH HYPOXIA (HCC): Status: ACTIVE | Noted: 2020-01-01

## 2020-10-30 PROBLEM — D69.6 THROMBOCYTOPENIA (HCC): Status: ACTIVE | Noted: 2020-01-01

## 2020-10-30 NOTE — ED PROVIDER NOTES
HPI  
 
79-year-old female without any significant past medical history presents to the emergency department with progressive shortness of breath and leg swelling. She has been losing weight. There is been no fever or cough. She denies any chest pain. She states she is urinating normally. She denies a history of any heart problems. She lives at home with her son. She denies any known COVID-19 symptoms or exposure. She reports her shortness of breath is worse laying flat. No past medical history on file. No past surgical history on file. No family history on file. Social History Socioeconomic History  Marital status: Not on file Spouse name: Not on file  Number of children: Not on file  Years of education: Not on file  Highest education level: Not on file Occupational History  Not on file Social Needs  Financial resource strain: Not on file  Food insecurity Worry: Not on file Inability: Not on file  Transportation needs Medical: Not on file Non-medical: Not on file Tobacco Use  Smoking status: Not on file Substance and Sexual Activity  Alcohol use: Not on file  Drug use: Not on file  Sexual activity: Not on file Lifestyle  Physical activity Days per week: Not on file Minutes per session: Not on file  Stress: Not on file Relationships  Social connections Talks on phone: Not on file Gets together: Not on file Attends Sikh service: Not on file Active member of club or organization: Not on file Attends meetings of clubs or organizations: Not on file Relationship status: Not on file  Intimate partner violence Fear of current or ex partner: Not on file Emotionally abused: Not on file Physically abused: Not on file Forced sexual activity: Not on file Other Topics Concern  Not on file Social History Narrative  Not on file ALLERGIES: Patient has no allergy information on record. Review of Systems Constitutional: Positive for unexpected weight change. Negative for fever. HENT: Negative for congestion. Eyes: Negative for visual disturbance. Respiratory: Positive for shortness of breath. Negative for cough and chest tightness. Cardiovascular: Positive for leg swelling. Negative for chest pain. Gastrointestinal: Negative for abdominal pain, nausea and vomiting. Genitourinary: Negative for decreased urine volume and dysuria. Skin: Negative for rash. Neurological: Negative for headaches. Psychiatric/Behavioral: Negative for dysphoric mood. Vitals:  
 10/30/20 0009 10/30/20 0010 BP: (!) 140/93 Pulse: (!) 122 Resp: 26 Temp: 98.7 °F (37.1 °C) SpO2: (!) 89% 98% Physical Exam 
Constitutional:   
   General: She is not in acute distress. Appearance: She is well-developed. She is ill-appearing. HENT:  
   Head: Normocephalic and atraumatic. Mouth/Throat:  
   Pharynx: No oropharyngeal exudate. Eyes:  
   General: No scleral icterus. Right eye: No discharge. Left eye: No discharge. Pupils: Pupils are equal, round, and reactive to light. Neck: Musculoskeletal: Normal range of motion and neck supple. Vascular: No JVD. Cardiovascular:  
   Rate and Rhythm: Normal rate and regular rhythm. Heart sounds: Normal heart sounds. No murmur. Pulmonary:  
   Effort: Pulmonary effort is normal. Tachypnea present. No respiratory distress. Breath sounds: Decreased air movement (decreased on left) present. No stridor. No wheezing or rales. Chest:  
   Chest wall: No tenderness. Abdominal:  
   General: Bowel sounds are normal. There is distension. Palpations: Abdomen is soft. There is no mass. Tenderness: There is no abdominal tenderness. There is no guarding or rebound. Musculoskeletal: Normal range of motion. Right lower leg: Edema present. Left lower leg: Edema present. Skin: 
   General: Skin is warm and dry. Capillary Refill: Capillary refill takes less than 2 seconds. Findings: No rash. Neurological:  
   Mental Status: She is oriented to person, place, and time. Psychiatric:     
   Behavior: Behavior normal.     
   Thought Content: Thought content normal.     
   Judgment: Judgment normal.  
 
  
 
MDM Number of Diagnoses or Management Options Anemia, unspecified type:  
Leukocytosis, unspecified type: Lymphadenopathy:  
Pleural effusion:  
35 minutes of critical care Procedures ED EKG interpretation: 
Rhythm:  sinus tachycardia; and regular . Rate (approx.): 108; Axis: normal; P wave: normal; QRS interval: normal ; ST/T wave: non-specific changes; This EKG was interpreted by Samreen Carranza MD,ED Provider. CXR shows large effusion on left. WBC 27K and hemoglobin 6.5. Patient is currently refusing a transfusion. Will ct chest to further evaluate whats going in the chest. Levaquin for now. CT scans shows large pleural effusion and lymphadenopathy. CT of the abdomen shows diffuse lymphadenopathy and splenomegaly. Patient most likely has a lymphoproliferative disorder. She is becoming more short of breath so we will start high flow nasal cannula and/or BiPAP. Currently patient is continuing to refuse blood despite the concern that she probably has a blood disorder and will not produce red cells on her own. I also discussed end-of-life care and patient states she would not want to be on life support. Perfect Serve Consult for Admission 4:07 AM 
 
ED Room Number: AF53/84 Patient Name and age:  Kai Figueroa 70 y.o.  female Working Diagnosis: 1. Pleural effusion 2. Anemia, unspecified type 3. Leukocytosis, unspecified type 4. Lymphadenopathy COVID-19 Suspicion:  no 
Sepsis present:  no  Reassessment needed: no 
 Code Status:  Do Not Resuscitate Readmission: no 
Isolation Requirements:  no 
Recommended Level of Care:  step down Department:[facility] Adult ED - [phone] Other: 81-year-old female coming from home without any significant past medical history with a chief complaint of shortness of breath and leg swelling. 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 Her white count is 27,000 and her hemoglobin is 6.5 her platelets are 68. Chest x-ray and CT scan show a large left pleural effusion with lymphadenopathy likely malignant. CT of her abdomen shows splenomegaly and lymphadenopathy. She is tachycardic tachypneic and hypoxic. She was doing better on nasal cannula. I am going to put her on high flow nasal cannula and/or BiPAP currently. Patient is currently refusing a blood transfusion and states that she does not want be put on life support. Her son is in the room currently and they are having further discussion.

## 2020-10-30 NOTE — CONSULTS
Palliative Medicine Consult Aakash: [phone] ([phone]) Patient Name: Esteban Madera YOB: 1945 Date of Initial Consult: October 30, 2020 Reason for Consult: Care Decisions Requesting Provider: Dr. Khris Panchal Primary Care Physician: Ronel, MD Nidia 
 
 SUMMARY:  
Esteban Madera is a 70 y.o. with a past history of left breast cancer (surgery, XRT), asthma, who was admitted on 10/30/2020 from home with a diagnosis of Large left pleural effusion (? Malignant) with plans for VATS today, anemia hgb 6.4, leukocytosis, neurtorpenia, tachycardia, thrombocytopenia, SIRS, acute respiratory failure with hypoxia, malnutrition, splenomegaly,lymphadenopathy/thrombocytopenia/neutropenia/Weight loss for which heme onc consulted. Family reports weeks of dyspnea, weight loss, fatigue, left rib pain Current medical issues leading to Palliative Medicine involvement include: support with care goals given acute illness, advance disease, poor prognosis. PALLIATIVE DIAGNOSES:  
1. Shortness of breath 2. Cachexia 3. Anorexia 4. Physical debility PLAN:  
1. Pt seen with son Maury Renee at the bedside. Services introduced 2. Pt alert but not interactive. She is getting blood and nurse was prepping for procedure 3. Contact information provided to son and requested we meet on Monday to discuss care~he agreed 4. Initial consult note routed to primary continuity provider and/or primary health care team members 5. Communicated plan of care with: Palliative [facility] Team 
 
 GOALS OF CARE / TREATMENT PREFERENCES:  
 
GOALS OF CARE: 
Patient/Health Care Proxy Stated Goals: Prolong life TREATMENT PREFERENCES:  
Code Status: Full Code Advance Care Planning: 
[x] [facility] Interdisciplinary Team has updated the ACP Navigator with Health Care Decision Maker and Patient Capacity 
l Primary Decision Maker (Active): Robert Gauthier - [phone] No flowsheet data found. Medical Interventions: Full interventions Other Instructions: Other: As far as possible, the palliative care team has discussed with patient / health care proxy about goals of care / treatment preferences for patient. HISTORY:  
 
History obtained from: chart CHIEF COMPLAINT: pt admitted with aforementioned history and issues HPI/SUBJECTIVE: The patient is:  
[] Verbal and participatory [x] Non-participatory due to:  
Medical condition Clinical Pain Assessment (nonverbal scale for severity on nonverbal patients):  
Clinical Pain Assessment Severity: 0 Duration: for how long has pt been experiencing pain (e.g., 2 days, 1 month, years) Frequency: how often pain is an issue (e.g., several times per day, once every few days, constant) FUNCTIONAL ASSESSMENT:  
 
Palliative Performance Scale (PPS): PPS: 20 
 
 
 PSYCHOSOCIAL/SPIRITUAL SCREENING:  
 
Palliative IDT has assessed this patient for cultural preferences / practices and a referral made as appropriate to needs (Cultural Services, Patient Advocacy, Ethics, etc.) Any spiritual / Church concerns: 
[] Yes /  [x] No 
 
Caregiver Burnout: 
[] Yes /  [x] No /  [] No Caregiver Present Anticipatory grief assessment:  
[x] Normal  / [] Maladaptive ESAS Anxiety: Anxiety: 0 
 
ESAS Depression:    
 
 
 REVIEW OF SYSTEMS:  
 
Positive and pertinent negative findings in ROS are noted above in HPI. The following systems were [x] reviewed objectively / [] unable to be reviewed as noted in HPI Other findings are noted below. Systems: constitutional, ears/nose/mouth/throat, respiratory, gastrointestinal, genitourinary, musculoskeletal, integumentary, neurologic, psychiatric, endocrine. Positive findings noted below. Modified ESAS Completed by: provider Fatigue: 10 Pain: 0 Anxiety: 0 Anorexia: 10 Dyspnea: 1 PHYSICAL EXAM:  
 
From RN flowsheet: Wt Readings from Last 3 Encounters:  
No data found for [employer] Blood pressure (!) 117/59, pulse (!) 113, temperature 97.5 °F (36.4 °C), resp. rate 27, SpO2 100 %. Pain Scale 1: Numeric (0 - 10) Pain Intensity 1: 0 Last bowel movement, if known:  
 
Constitutional: cachexia, non verbal 
Eyes: pupils equal, anicteric ENMT: no nasal discharge, dry mucous membranes Cardiovascular:  
Respiratory: breathing not labored, symmetric Gastrointestinal: Musculoskeletal: no deformity Skin: warm, dry Neurologic: difficult to assess Psychiatric: calm Other: 
 
 
 HISTORY:  
 
Principal Problem: 
  Acute respiratory failure with hypoxia ([address] (10/30/2020) Active Problems: 
  Leukocytosis (10/30/2020) Neutropenia ([address] (10/30/2020) Anemia (10/30/2020) Tachycardia (10/30/2020) Thrombocytopenia ([address] (10/30/2020) SIRS (systemic inflammatory response syndrome) ([address] (10/30/2020) Pleural effusion, left (10/30/2020) No past medical history on file. No past surgical history on file. No family history on file. History reviewed, no pertinent family history. Social History Tobacco Use  Smoking status: Not on file Substance Use Topics  Alcohol use: Not on file No Known Allergies Current Facility-Administered Medications Medication Dose Route Frequency  0.9% sodium chloride infusion 250 mL  250 mL IntraVENous PRN  piperacillin-tazobactam (ZOSYN) 3.375 g in 0.9% sodium chloride (MBP/ADV) 100 mL  3.375 g IntraVENous Q8H  
 0.9% sodium chloride infusion  125 mL/hr IntraVENous CONTINUOUS  
 sodium chloride (NS) flush 5-40 mL  5-40 mL IntraVENous Q8H  
 sodium chloride (NS) flush 5-40 mL  5-40 mL IntraVENous PRN  
 acetaminophen (TYLENOL) tablet 650 mg  650 mg Oral Q6H PRN Or  
 acetaminophen (TYLENOL) suppository 650 mg  650 mg Rectal Q6H PRN  polyethylene glycol (MIRALAX) packet 17 g  17 g Oral DAILY PRN  
  ceFAZolin (ANCEF) 2 g/20 mL in sterile water IV syringe  2 g IntraVENous ON CALL TO OR  
 0.9% sodium chloride infusion 250 mL  250 mL IntraVENous PRN  
 
 
 
 LAB AND IMAGING FINDINGS:  
 
Lab Results Component Value Date/Time WBC 23.0 (H) 10/30/2020 12:07 PM  
 HGB 6.7 (L) 10/30/2020 12:07 PM  
 PLATELET 58 (L) 72/47/5672 12:07 PM  
 
Lab Results Component Value Date/Time Sodium 142 10/30/2020 12:31 AM  
 Potassium 3.8 10/30/2020 12:31 AM  
 Chloride 106 10/30/2020 12:31 AM  
 CO2 30 10/30/2020 12:31 AM  
 BUN 19 10/30/2020 12:31 AM  
 Creatinine 0.72 10/30/2020 12:31 AM  
 Calcium 8.5 10/30/2020 12:31 AM  
  
Lab Results Component Value Date/Time Alk. phosphatase 122 (H) 10/30/2020 12:31 AM  
 Protein, total 7.0 10/30/2020 12:31 AM  
 Albumin 2.7 (L) 10/30/2020 12:31 AM  
 Globulin 4.3 (H) 10/30/2020 12:31 AM  
 
No results found for: INR, PTMR, PTP, PT1, PT2, APTT, INREXT No results found for: IRON, FE, TIBC, IBCT, PSAT, FERR No results found for: PH, PCO2, PO2 No components found for: [place] No results found for: CPK, CKMB Total time: 50 min Counseling / coordination time, spent as noted above: 40 min 
> 50% counseling / coordination?: y 
 
Prolonged service was provided for  []30 min   []75 min in face to face time in the presence of the patient, spent as noted above. Time Start:  
Time End:  
Note: this can only be billed with 54314 (initial) or 28073 (follow up). If multiple start / stop times, list each separately.

## 2020-10-30 NOTE — PROGRESS NOTES
48 Pt going OR for surgery. Report called to OR. Pt Hgb 6.7 platelets 58. MD notified. Problem: Breathing Pattern - Ineffective Goal: *Absence of hypoxia Outcome: Progressing Towards Goal 
  
Maintained 02 sats WDL on 10 L mid flow.

## 2020-10-30 NOTE — BRIEF OP NOTE
Brief Postoperative Note Patient: Rosalina Tinajero YOB: 1945 MRN: [id number] Date of Procedure: 10/30/2020 Pre-Op Diagnosis: malignant pleural effusion Post-Op Diagnosis: Same as preoperative diagnosis. Procedure(s): LEFT VATS PLEURAL BIOPSIES POSSIBLE TALC PLEURODESIS/ 
 
Surgeon(s): 
Aida Golden MD 
 
Surgical Assistant: Physician Assistant: Tavon Lance Anesthesia: General  
 
Estimated Blood Loss (mL): Minimal 
 
Complications: Hypotension and Hypoxemia Specimens:  
ID Type Source Tests Collected by Time Destination 1 : left pleural biopsy [facility]  Aida Golden MD 10/30/2020 1815 Pathology 2 : left diaphragmatic biopsy [facility] (use comments)  Aida Golden MD 10/30/2020 1816 Pathology 1 : left pleural fluid [facility]  Aida Golden MD 10/30/2020 1813 Cytology Implants: * No implants in log * Drains:  
External Female Catheter 10/30/20 (Active) Site Assessment Clean, dry, & intact 10/30/20 2621 Repositioned Yes 10/30/20 0656 Perineal Care Yes 10/30/20 0656 Sixto Vu Yes 10/30/20 2452 Suction Canister/Tubing Changed Yes 10/30/20 0656 Findings: >2.5L bloody pleural effusion Condition: critical  
 
Disposition: to icu Electronically Signed by Danae Shore MD on 10/30/2020 at 6:39 PM

## 2020-10-30 NOTE — PROGRESS NOTES
Consult received Full note to follow Large left pleural effusion with mediastinal lymphadenopathy Suspect malignant effusion We plan to either place a large chest tube or take her for a VATS later this morning

## 2020-10-30 NOTE — PERIOP NOTES
2 bracelets,3 rings, 3 necklaces, 1 anklet, 1 set of studs and 1 set of loop earrings given to Ej Montes) at bedside. 1600 patient presented to OR Holding with blood transfusing

## 2020-10-30 NOTE — ED TRIAGE NOTES
Patient reports shortness of breath that has worsened over the past 2-3 days. Also endorses swelling in her legs and in her abdomen. Denies fevers, cough, sick contact. Denies chest pain. Room air sat 89%

## 2020-10-30 NOTE — ANESTHESIA PROCEDURE NOTES
Arterial Line Placement Performed by: Dk Ferro DO Authorized by: Dk Ferro DO  
 
Pre-Procedure Indications:  Arterial pressure monitoring Preanesthetic Checklist: patient identified, risks and benefits discussed, anesthesia consent, site marked, patient being monitored, timeout performed and patient being monitored Procedure:  
Prep:  Chlorhexidine Seldinger Technique?: Yes Orientation:  Right Location:  Radial artery Catheter size:  20 G Number of attempts:  1 Cont Cardiac Output Sensor: No   
 
Assessment:  
Post-procedure:  Line secured and sterile dressing applied Patient Tolerance:  Patient tolerated the procedure well with no immediate complications Comment:  
Pt pulled out left eliza

## 2020-10-30 NOTE — H&P
History & Physical 
 
Date of admission: 10/30/2020 Patient name: John Villanueva MRN: [id number] YOB: 1945 Age: 70 y.o. Primary care provider:  Other, MD Nidia  
 
Source of Information: patient, patient's son, ED and electronic medical records Chief complaint:  Shortness of breath History of present illness John Villanueva is a 70 y.o. female with past medical history of left breast cancer, s/p radiation, and childhood asthma presented to the ED from home with chief complaint of SOB, leg swelling, and weight loss. Symptoms reportedly have been gradual in onset, worsening, now severe, constant, with SOB, LOPEZ, orthopnea. Patient reported no known chronic medical conditions. There were no reports of recent sick contacts or exposure to anyone with COVID 19. On arrival in the ED tonight, initial recorded vital signs were BP = 140/93, HR= 122, RR = 26, O2sat = 89% on room air. Patient was initially placed on 2 lites O2 via nasal cannula (NC). However per ED MD report, patient has becoming increasing dyspneic, with increased supplemental oxygen requirement. Patient had no known prior lung disease. CTA chest revealed a large left pleural effusion with left lung atelectasis. Labs revealed WBC = 27.2, neutrophils = 2%, and absolute neutrophil count = 0.5. Hemoglobin = 6.4 (with no comparison lab available for review). ED offered PRBC transfusion but patient reportedly refused. She was started on Levofloxacin 750 mg IV for antibiotic coverage. Patient is now seen for admission to the hospitalist service. PAST MEDICAL HISTORY: 
Left breast cancer Asthma (childhood) PAST SURGICAL HISTORY: 
Left breast lumpectomy MEDICATIONS: 
NONE ALLERGIES: 
NO KNOWN DRUG ALLERGIES 
 
FAMILY HISTORY: 
Unknown regarding family's chronic medical conditions Social history Patient resides X  Independently Ambulates 
x  Independently Alcohol history  
x  None Smoking history None  
x  Former smoker Code status X  FULL CODE Code status was discussed with the patient and her son. Review of systems Pertinent positives as noted in HPI. All other systems were reviewed and were negative. Physical Examination Visit Vitals /68 Pulse (!) 117 Temp 98.7 °F (37.1 °C) Resp 24 SpO2 100% O2 Flow Rate (L/min): 2 l/min O2 Device: Nasal cannula General:  Ill appearing female in acute respiratory distress Head:  Normocephalic, without obvious abnormality, atraumatic Eyes:  Conjunctivae/corneas clear. PERRL, EOMs intact E/N/M/T: Nares normal. Septum midline. No nasal drainage or sinus tenderness Clear oropharynx Neck: Normal appearance and movements, symmetrical, trachea midline No palpable adenopathy No thyroid enlargement, tenderness or nodules No carotid bruit No JVD Lungs:   Symmetrical chest expansion and respiratory effort Rales to auscultation bilaterally Chest wall:  No tenderness or deformity Heart:  Tachycardic Regular rhythm Sounds normal; no murmur, click, rub or gallop Abdomen:   Soft, no tenderness No rebound, guarding, or rigidity Non-distended Bowel sounds normal 
No masses or hepatosplenomegaly No hernias present Back: No CVA tenderness Extremities: Extremities normal, atraumatic No cyanosis Bilateral leg edema (2+ pitting) Pulses 2+ radial/ 1+ DP bilateral pulses Skin: No rashes or ulcers Warm/ dry Musculo- 
    skeletal: Gait not tested Neuro: GCS 15. Moves all extremities x 4 with generalized weakness. No slurred speech. No facial droop. Sensation grossly intact. Psych: Alert, oriented x 3 Geniturinary:   
 
Data Review 24 Hour Results: 
Recent Results (from the past 24 hour(s)) EKG, 12 LEAD, INITIAL Collection Time: 10/30/20 12:04 AM  
Result Value Ref Range Ventricular Rate 108 BPM  
 Atrial Rate 108 BPM  
 P-R Interval 124 ms QRS Duration 70 ms Q-T Interval 298 ms QTC Calculation (Bezet) 399 ms Calculated P Axis 50 degrees Calculated R Axis 49 degrees Calculated T Axis 49 degrees Diagnosis Sinus tachycardia Septal infarct , age undetermined No previous ECGs available CBC WITH AUTOMATED DIFF Collection Time: 10/30/20 12:31 AM  
Result Value Ref Range WBC 27.2 (H) 3.6 - 11.0 K/uL  
 RBC 2.79 (L) 3.80 - 5.20 M/uL HGB 6.4 (L) 11.5 - 16.0 g/dL HCT 23.6 (L) 35.0 - 47.0 % MCV 84.6 80.0 - 99.0 FL  
 MCH 22.9 (L) 26.0 - 34.0 PG  
 MCHC 27.1 (L) 30.0 - 36.5 g/dL RDW 20.8 (H) 11.5 - 14.5 % PLATELET 68 (L) 828 - 400 K/uL MPV 9.6 8.9 - 12.9 FL  
 NRBC 0.8 (H) 0  WBC ABSOLUTE NRBC 0.23 (H) 0.00 - 0.01 K/uL NEUTROPHILS 2 (L) 32 - 75 % LYMPHOCYTES 98 (H) 12 - 49 % MONOCYTES 0 (L) 5 - 13 % EOSINOPHILS 0 0 - 7 % BASOPHILS 0 0 - 1 % IMMATURE GRANULOCYTES 0 %  
 ABS. NEUTROPHILS 0.5 (L) 1.8 - 8.0 K/UL  
 ABS. LYMPHOCYTES 26.7 (H) 0.8 - 3.5 K/UL  
 ABS. MONOCYTES 0.0 0.0 - 1.0 K/UL  
 ABS. EOSINOPHILS 0.0 0.0 - 0.4 K/UL  
 ABS. BASOPHILS 0.0 0.0 - 0.1 K/UL  
 ABS. IMM. GRANS. 0.0 K/UL  
 DF MANUAL    
 RBC COMMENTS ANISOCYTOSIS 2+ 
    
 RBC COMMENTS HYPOCHROMIA 2+ 
    
 RBC COMMENTS ATYPICAL LYMPHOCYTES PRESENT 
MICROCYTOSIS 1+ 
    
 RBC COMMENTS OVALOCYTES 1+ RBC COMMENTS SMUDGE CELLS SEEN 
POLYCHROMASIA PRESENT 
    
 RBC COMMENTS SCHISTOCYTES PRESENT Pathologist review Pathology Review Requested TROPONIN I Collection Time: 10/30/20 12:31 AM  
Result Value Ref Range Troponin-I, Qt. <0.05 <0.05 ng/mL SAMPLES BEING HELD Collection Time: 10/30/20 12:31 AM  
Result Value Ref Range SAMPLES BEING HELD 1red,1blu COMMENT   Add-on orders for these samples will be processed based on acceptable specimen integrity and analyte stability, which may vary by analyte. METABOLIC PANEL, COMPREHENSIVE Collection Time: 10/30/20 12:31 AM  
Result Value Ref Range Sodium 142 136 - 145 mmol/L Potassium 3.8 3.5 - 5.1 mmol/L Chloride 106 97 - 108 mmol/L  
 CO2 30 21 - 32 mmol/L Anion gap 6 5 - 15 mmol/L Glucose 115 (H) 65 - 100 mg/dL BUN 19 6 - 20 MG/DL Creatinine 0.72 0.55 - 1.02 MG/DL  
 BUN/Creatinine ratio 26 (H) 12 - 20 GFR est AA >60 >60 ml/min/1.73m2 GFR est non-AA >60 >60 ml/min/1.73m2 Calcium 8.5 8.5 - 10.1 MG/DL Bilirubin, total 0.4 0.2 - 1.0 MG/DL  
 ALT (SGPT) 14 12 - 78 U/L  
 AST (SGOT) 26 15 - 37 U/L Alk. phosphatase 122 (H) 45 - 117 U/L Protein, total 7.0 6.4 - 8.2 g/dL Albumin 2.7 (L) 3.5 - 5.0 g/dL Globulin 4.3 (H) 2.0 - 4.0 g/dL A-G Ratio 0.6 (L) 1.1 - 2.2 NT-PRO BNP Collection Time: 10/30/20 12:31 AM  
Result Value Ref Range NT pro- (H) <125 PG/ML  
TYPE & SCREEN Collection Time: 10/30/20 12:43 AM  
Result Value Ref Range Crossmatch Expiration 11/02/2020,2359 ABO/Rh(D) A POSITIVE Antibody screen NEG Unit number [id number] Blood component type RC LR Unit division 00 Status of unit ALLOCATED Crossmatch result Compatible RBC, ALLOCATE Collection Time: 10/30/20  1:30 AM  
Result Value Ref Range HISTORY CHECKED? Historical check performed LACTIC ACID Collection Time: 10/30/20  2:07 AM  
Result Value Ref Range Lactic acid 1.2 0.4 - 2.0 MMOL/L Recent Labs 10/30/20 
0031 WBC 27.2* HGB 6.4* HCT 23.6* PLT 68* Recent Labs 10/30/20 
0031   
K 3.8  CO2 30 * BUN 19  
CREA 0.72  
CA 8.5 ALB 2.7* TBILI 0.4 ALT 14 Imaging CTA CHEST W OR W WO CONT FINDINGS:  
There is no pulmonary embolism. There is no aortic aneurysm or dissection. 
  
There is a large left-sided pleural effusion.  Atelectasis/consolidation of the 
 left lung. Mediastinal and hilar adenopathy. Right hilar adenopathy. The aorta 
is normal in course and caliber. The proximal pulmonary arteries are without 
evidence of filling defects. No lytic or blastic lesions are identified. The 
remainder of the upper abdomen visualized is unremarkable. Mild dextroscoliosis. Small nodular density on the right adjacent to the fissures most likely 
atelectatic in nature. 
  
IMPRESSION:  
There is no pulmonary embolism. There is no aortic aneurysm or dissection. Large left-sided pleural effusion with left lung atelectasis. Left to right midline shift. There is mediastinal and hilar adenopathy which is 
extensive. Neoplastic etiology should be considered. CT ABD PELV WO CONT FINDINGS:  
LOWER THORAX: Mediastinal and hilar lymphadenopathy. Large left-sided pleural 
effusion with atelectatic change of the left lung. LIVER/GALLBLADDER: No mass. Gallbladder is within normal limits. CBD is not 
dilated. SPLEEN/PANCREAS:  There is massive splenomegaly. Massive retroperitoneal 
lymphadenopathy. ADRENALS/KIDNEYS: Left kidney is displaced anteriorly and to the left. No 
calculus or hydronephrosis. STOMACH: Effaced. SMALL BOWEL/COLON: Limited evaluation. Extensive fecal stasis. APPENDIX: Not visualized. PERITONEUM: There is minimal ascites present. RETROPERITONEUM: Extensive retroperitoneal adenopathy encases the abdominal 
aorta. REPRODUCTIVE ORGANS: Limited evaluation. URINARY BLADDER: No mass or calculus. BONES: No destructive bone lesion. ADDITIONAL COMMENTS: N/A 
  
IMPRESSION: 
There is massive splenomegaly with extensive retroperitoneal adenopathy. 
  
  
Findings are consistent with lymphoproliferative disorder. CHEST XRAY PORTABLE: 
 
FINDINGS: 
AP portable upright view of the chest demonstrates a large cardiopericardial 
silhouette. Moderate to large left-sided pleural effusion. Extensive left lung parenchymal opacity with atelectasis and consolidation. .Patient is on a cardiac 
monitor. IMPRESSION: 
Extensive left lung parenchymal opacity with parapneumonic effusion. Mediastinal adenopathy is suspected. Cardiomegaly. Assessment and Plan 1. Acute respiratory failure with hypoxia - Admit to IMCU with continuous telemetry and pulse oximetry monitoring 
    - was on nasal cannula but as not improving ED placing on high flow/ possible BIPAP For supplemental oxygen support 
    - check ABG 
    - consult pulmonologist 
     
2. Large left pleural effusion 
     - consult thoracic surgery for drainage procedure. - will keep patient NPO for possible procedure 3. Symptomatic anemia - patient was offered but refused blood transfusion for unknown reason 
     - patient has high possibility for clinical deterioration given this and aforementioned diagnoses. - repeat H/H 
     - check iron profile, B12, and folate levels. Order FOBT 4. SIRS (systemic inflammatory response syndrome) - with noted leukocytosis, tachycardia, hypoxia, tachypnea 
     - order sepsis protocol; check blood cultures. Order IV antibiotics. - continue IV fluids 5. Leukocytosis - plan as above and repeat CBC 6. Neutropenia 
     - consider for possible immunocompromised state, possible neoplasm 
     - consult hematologist 
 
7. Tachycardia  
     - continue telemetry monitoring 8. Thrombocytopenia  
     - repeat platelet count 9. Edema of both lower extremities - keep legs elevated at rest 
 
10. Retroperitoneal adenopathy 
       - plan as above 11. Splenomegaly 
       - massive. Consider workup as noted - CT and lab findings concerning for possible lymphoproliferative disorder 
       - consult with hematologist 
 
12. Weight loss 
      - again consider for possible malignant disease - order daily weights. Consider for nutrition evaluation 13. Atelectasis 
       - encourage incentive spirometry 14. VTE prophylaxis - SCDs to BLEs CODE STATUS:  FULL CODE as discussed with patient and her son who requested the same.   
 
 
Signed by: Debra Bedolla MD  
 October 30, 2020 at 4:28 AM

## 2020-10-30 NOTE — ANESTHESIA PROCEDURE NOTES
Arterial Line Placement Performed by: Ciarra Arguelles DO Authorized by: Ciarra Arguelles DO  
 
Pre-Procedure Indications:  Arterial pressure monitoring and blood sampling Preanesthetic Checklist: patient identified, risks and benefits discussed, anesthesia consent, site marked, patient being monitored, timeout performed and patient being monitored Procedure:  
Prep:  Chlorhexidine Seldinger Technique?: Yes Orientation:  Left Location:  Radial artery Catheter size:  20 G Number of attempts:  1 Cont Cardiac Output Sensor: No   
 
Assessment:  
Post-procedure:  Sterile dressing applied and line secured Patient Tolerance:  Patient tolerated the procedure well with no immediate complications

## 2020-10-30 NOTE — CONSULTS
Pulmonary Reason:  Pleural effusion Requesting:  Dr Elizabeth Newsome 
 
69 yo female with no significant PMH presented with shortness of breath and weight loss. CXR and CT show large L pleural effusion with shift of mediastinum to the R Chest CT also shows significant lymphadenopathy Thoracic surgery has been consulted and plan to either place and chest tube or have a VATS procedure performed later today Patient Vitals for the past 4 hrs: 
 BP Pulse Resp SpO2  
10/30/20 0950 121/76     
10/30/20 0730 126/73 (!) 124 22 100 % 10/30/20 0700 122/62 (!) 121 23 100 % 10/30/20 0604    100 % 10/30/20 0600 120/66 (!) 118 18 97 % Temp (24hrs), Av.4 °F (36.9 °C), Min:98.2 °F (36.8 °C), Max:98.7 °F (37.1 °C) Intake/Output Summary (Last 24 hours) at 10/30/2020 8835 Last data filed at 10/30/2020 0300 Gross per 24 hour Intake 727.08 ml Output  Net 727.08 ml Lab: 
Recent Labs 10/30/20 
0207 10/30/20 
0031 WBC  --  27.2* HGB  --  6.4*  
PLT  --  68* NA  --  142 K  --  3.8 CL  --  106 CO2  --  30  
BUN  --  19  
CREA  --  0.72 GLU  --  115* CA  --  8.5  
TROIQ  --  <0.05 TBILI  --  0.4 LAC 1.2  -- Impression Tension hydrothorax with mediastinal lymphadenopathy concern for malignant effusion vs chylothorax Recommendations: 
Agree with Thoracic surgery eval for chest tube vs VATS for treatment and dx Will likely need oncology evaluation Will see as needed Azeb Prieto MD

## 2020-10-30 NOTE — PERIOP NOTES
1735 patient pulled out left radial art line. 1745 pressure drsg applied to left wrist 
1740 patient sats . 84, coarse audible crackles,  100% bagging began, anesthesia at bedside.  
Patient taken to OR, 100% bagging on monitor with anesthesia team.

## 2020-10-30 NOTE — ANESTHESIA PREPROCEDURE EVALUATION
Relevant Problems No relevant active problems Anesthetic History No history of anesthetic complications Review of Systems / Medical History Patient summary reviewed, nursing notes reviewed and pertinent labs reviewed Pulmonary Within defined limits Comments: Pleural effusion Neuro/Psych Within defined limits Cardiovascular Within defined limits GI/Hepatic/Renal 
Within defined limits Endo/Other Anemia Other Findings Physical Exam 
 
Airway Mallampati: II 
TM Distance: 4 - 6 cm Neck ROM: normal range of motion Mouth opening: Normal 
 
 Cardiovascular Regular rate and rhythm,  S1 and S2 normal,  no murmur, click, rub, or gallop Dental 
 
Dentition: Poor dentition Pulmonary Breath sounds clear to auscultation Abdominal 
GI exam deferred Other Findings Anesthetic Plan ASA: 3 Anesthesia type: general 
 
Monitoring Plan: Arterial line Induction: Intravenous Anesthetic plan and risks discussed with: Patient

## 2020-10-30 NOTE — ROUTINE PROCESS
Patient: Wendy Ruiz MRN: [id number]  SSN: [id number] YOB: 1945  Age: 70 y.o. Sex: female Patient is status post Procedure(s): LEFT VATS PLEURAL BIOPSIES POSSIBLE TALC PLEURODESIS/. 
 
Surgeon(s) and Role: Lucretia Quintana MD - Primary Local/Dose/Irrigation:  Lido 1% 26 ml/marcaine 0.25% 26 ml Peripheral IV 10/30/20 Right Arm (Active) Site Assessment Clean, dry, & intact 10/30/20 1529 Phlebitis Assessment 0 10/30/20 1529 Infiltration Assessment 0 10/30/20 1529 Dressing Status Clean, dry, & intact 10/30/20 1529 Dressing Type Tape;Transparent 10/30/20 1529 Hub Color/Line Status Pink; Infusing 10/30/20 1529 Action Taken Open ports on tubing capped 10/30/20 1529 Alcohol Cap Used Yes 10/30/20 1529 Peripheral IV 10/30/20 Left Forearm (Active) Site Assessment Clean, dry, & intact 10/30/20 1529 Phlebitis Assessment 0 10/30/20 1529 Infiltration Assessment 0 10/30/20 1529 Dressing Status Clean, dry, & intact 10/30/20 1529 Dressing Type Tape;Transparent 10/30/20 1529 Hub Color/Line Status Pink; Infusing 10/30/20 1529 Action Taken Open ports on tubing capped 10/30/20 1529 Alcohol Cap Used Yes 10/30/20 1529 Arterial Line 10/30/20 Left Radial artery (Active) External Female Catheter 10/30/20 (Active) Site Assessment Clean, dry, & intact 10/30/20 2208 Repositioned Yes 10/30/20 0656 Perineal Care Yes 10/30/20 0656 Yanna Schaumann Yes 10/30/20 3878 Suction Canister/Tubing Changed Yes 10/30/20 0656 Airway - Endotracheal Tube 10/30/20 Oral (Active) Dressing/Packing:  Wound Chest Left 2 port sites-Dressing Type: Topical skin adhesive/glue (10/30/20 1826) Wound Chest Left Left chest tube site-Dressing Type: 4 x 4;Split gauze;Special tape (comment) (10/30/20 1826) Splint/Cast:  ] Other:  No benítez; defib pads on chest; left chest tube; scds; arterial line; needs one channel pump; VENT SETTINGS: RR16, , O2 100%, 4 PEEP

## 2020-10-30 NOTE — CONSULTS
Asked to see Mrs. Rochelle Dacosta re: pleural effusion Referred by [facility] ER Mrs Jailene Manzo is a pleasant 69 y/o lady with a past medical history significant for breast cancer and asthma. She presented to the ER with acute hypoxic respiratory distress. She and her family report 2-3 weeks of progressive dyspnea, weight loss and fatigued. She has had a decline in performance status and appetite. She does report left sided pleuritic chest pain. No Known Allergies PMHx: breast cancer, asthma PSHx: lumpectomy SocHx: remote tobacco use No family history on file. Current Facility-Administered Medications:  
  0.9% sodium chloride infusion 250 mL, 250 mL, IntraVENous, PRN, Kerrie Vargas MD, Last Rate: 15 mL/hr at 10/30/20 0504, 250 mL at 10/30/20 0504   piperacillin-tazobactam (ZOSYN) 3.375 g in 0.9% sodium chloride (MBP/ADV) 100 mL, 3.375 g, IntraVENous, Q8H, Nahum Stephens MD 
  0.9% sodium chloride infusion, 125 mL/hr, IntraVENous, CONTINUOUS, Anna Marie Stephens MD, Last Rate: 125 mL/hr at 10/30/20 1024, 125 mL/hr at 10/30/20 1024 
  sodium chloride (NS) flush 5-40 mL, 5-40 mL, IntraVENous, Q8H, Nahum Stephens MD, 10 mL at 10/30/20 8698   sodium chloride (NS) flush 5-40 mL, 5-40 mL, IntraVENous, PRN, Anna Marie Stephens MD 
  acetaminophen (TYLENOL) tablet 650 mg, 650 mg, Oral, Q6H PRN **OR** acetaminophen (TYLENOL) suppository 650 mg, 650 mg, Rectal, Q6H PRN, Anna Marie Stephens MD 
  polyethylene glycol (MIRALAX) packet 17 g, 17 g, Oral, DAILY PRN, Anna Marie Stephens MD 
No current outpatient medications on file. ROS: 
 
Constitutional- weight loss and fatigue HEENT- some  dysphagia Neuro- denies syncope Optho- no recent changes in vision Resp- dyspnea, pleuritic chest pain CV- denies angina or palpitations GI- decrease appetite - no complaints ID- denies recent fevers Vasc- denies claudication Blood pressure 127/77, pulse (!) 119, temperature 98.2 °F (36.8 °C), resp. rate 19, SpO2 100 %. On exam she is laying in bed Alert and oriented Appears cachectic Curled on her left side Weak voice 
tachypneic Temporal wasting No cervical or supraclavicular lymphadenopathy Absent breath sounds left side Rrr, no murmurs Radial pulses palp b/l 
abd sntnd, no organomegaly LE non edematous ============================== Recent Results (from the past 24 hour(s)) EKG, 12 LEAD, INITIAL Collection Time: 10/30/20 12:04 AM  
Result Value Ref Range Ventricular Rate 108 BPM  
 Atrial Rate 108 BPM  
 P-R Interval 124 ms QRS Duration 70 ms Q-T Interval 298 ms QTC Calculation (Bezet) 399 ms Calculated P Axis 50 degrees Calculated R Axis 49 degrees Calculated T Axis 49 degrees Diagnosis Sinus tachycardia Septal infarct , age undetermined No previous ECGs available CBC WITH AUTOMATED DIFF Collection Time: 10/30/20 12:31 AM  
Result Value Ref Range WBC 27.2 (H) 3.6 - 11.0 K/uL  
 RBC 2.79 (L) 3.80 - 5.20 M/uL HGB 6.4 (L) 11.5 - 16.0 g/dL HCT 23.6 (L) 35.0 - 47.0 % MCV 84.6 80.0 - 99.0 FL  
 MCH 22.9 (L) 26.0 - 34.0 PG  
 MCHC 27.1 (L) 30.0 - 36.5 g/dL RDW 20.8 (H) 11.5 - 14.5 % PLATELET 68 (L) 844 - 400 K/uL MPV 9.6 8.9 - 12.9 FL  
 NRBC 0.8 (H) 0  WBC ABSOLUTE NRBC 0.23 (H) 0.00 - 0.01 K/uL NEUTROPHILS 2 (L) 32 - 75 % LYMPHOCYTES 98 (H) 12 - 49 % MONOCYTES 0 (L) 5 - 13 % EOSINOPHILS 0 0 - 7 % BASOPHILS 0 0 - 1 % IMMATURE GRANULOCYTES 0 %  
 ABS. NEUTROPHILS 0.5 (L) 1.8 - 8.0 K/UL  
 ABS. LYMPHOCYTES 26.7 (H) 0.8 - 3.5 K/UL  
 ABS. MONOCYTES 0.0 0.0 - 1.0 K/UL  
 ABS. EOSINOPHILS 0.0 0.0 - 0.4 K/UL  
 ABS. BASOPHILS 0.0 0.0 - 0.1 K/UL  
 ABS. IMM. GRANS. 0.0 K/UL  
 DF MANUAL    
 RBC COMMENTS ANISOCYTOSIS 2+ 
    
 RBC COMMENTS HYPOCHROMIA 2+ 
    
 RBC COMMENTS ATYPICAL LYMPHOCYTES PRESENT 
MICROCYTOSIS 
1+ 
    
 RBC COMMENTS OVALOCYTES 1+ RBC COMMENTS SMUDGE CELLS SEEN 
POLYCHROMASIA PRESENT 
    
 RBC COMMENTS SCHISTOCYTES PRESENT Pathologist review Pathology Review Requested TROPONIN I Collection Time: 10/30/20 12:31 AM  
Result Value Ref Range Troponin-I, Qt. <0.05 <0.05 ng/mL SAMPLES BEING HELD Collection Time: 10/30/20 12:31 AM  
Result Value Ref Range SAMPLES BEING HELD 1red,1blu COMMENT Add-on orders for these samples will be processed based on acceptable specimen integrity and analyte stability, which may vary by analyte. METABOLIC PANEL, COMPREHENSIVE Collection Time: 10/30/20 12:31 AM  
Result Value Ref Range Sodium 142 136 - 145 mmol/L Potassium 3.8 3.5 - 5.1 mmol/L Chloride 106 97 - 108 mmol/L  
 CO2 30 21 - 32 mmol/L Anion gap 6 5 - 15 mmol/L Glucose 115 (H) 65 - 100 mg/dL BUN 19 6 - 20 MG/DL Creatinine 0.72 0.55 - 1.02 MG/DL  
 BUN/Creatinine ratio 26 (H) 12 - 20 GFR est AA >60 >60 ml/min/1.73m2 GFR est non-AA >60 >60 ml/min/1.73m2 Calcium 8.5 8.5 - 10.1 MG/DL Bilirubin, total 0.4 0.2 - 1.0 MG/DL  
 ALT (SGPT) 14 12 - 78 U/L  
 AST (SGOT) 26 15 - 37 U/L Alk. phosphatase 122 (H) 45 - 117 U/L Protein, total 7.0 6.4 - 8.2 g/dL Albumin 2.7 (L) 3.5 - 5.0 g/dL Globulin 4.3 (H) 2.0 - 4.0 g/dL A-G Ratio 0.6 (L) 1.1 - 2.2 NT-PRO BNP Collection Time: 10/30/20 12:31 AM  
Result Value Ref Range NT pro- (H) <125 PG/ML  
TYPE & SCREEN Collection Time: 10/30/20 12:43 AM  
Result Value Ref Range Crossmatch Expiration 11/02/2020,2359 ABO/Rh(D) A POSITIVE Antibody screen NEG Unit number [id number] Blood component type RC LR Unit division 00 Status of unit ISSUED Crossmatch result Compatible RBC, ALLOCATE Collection Time: 10/30/20  1:30 AM  
Result Value Ref Range HISTORY CHECKED? Historical check performed LACTIC ACID  Collection Time: 10/30/20  2:07 AM  
 Result Value Ref Range Lactic acid 1.2 0.4 - 2.0 MMOL/L  
ECHO ADULT COMPLETE Collection Time: 10/30/20 10:14 AM  
Result Value Ref Range IVSd 0.60 0.6 - 0.9 cm LVIDd 4.77 3.9 - 5.3 cm LVIDs 3.31 cm  
 LVOT d 1.44 cm  
 LVPWd 0.58 (A) 0.6 - 0.9 cm  
 BP EF 49.2 (A) 55 - 100 percent LV Ejection Fraction MOD 2C 50 percent LV Ejection Fraction MOD 4C 54 percent LV ED Vol A2C 73.54 mL  
 LV ED Vol A4C 55.81 mL  
 LV ED Vol BP 69.56 56 - 104 mL  
 LV ES Vol A2C 36.63 mL  
 LV ES Vol A4C 25.41 mL  
 LV ES Vol BP 35.33 19 - 49 mL  
 LVOT Peak Gradient 5.08 mmHg LVOT Peak Velocity 112.67 cm/s RVIDd 3.27 cm  
 RVSP 70.20 mmHg Left Atrium Major Axis 3.66 cm  
 LA Volume 65.41 22 - 52 mL  
 LA Area 4C 24.46 cm2 LA Vol 2C 46.23 22 - 52 mL  
 LA Vol 4C 74.51 (A) 22 - 52 mL Est. RA Pressure 12.00 mmHg Aortic Valve Area by Continuity of Peak Velocity 1.14 cm2 AoV PG 10.15 mmHg Aortic Valve Systolic Peak Velocity 378.04 cm/s  
 MV A Vance 107.20 cm/s Mitral Valve E Wave Deceleration Time 192.34 ms MV E Vance 77.65 cm/s Mitral Valve Pressure Half-time 55.78 ms  
 MVA (PHT) 3.94 cm2 Pulmonic Valve Systolic Peak Instantaneous Gradient 3.05 mmHg Tapse 1.65 1.5 - 2.0 cm Triscuspid Valve Regurgitation Peak Gradient 58.20 mmHg  
 TR Max Velocity 381.46 cm/s Ao Root D 2.35 cm IVC proximal 1.57 cm  
 MV E/A 0.72   
 LV Mass AL 85.6 67 - 162 g  
 
============================== 
 
I have personally reviewed her chest ct. Large left effusion with midline shift. Hilar and mediastinal lymphadenopathy. 
 
============================== 
 
PFTs- none 
 
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 
 
============================= 
 
Mrs. Gant Monument has a large symptomatic pleural effusion.   By Chest Ct and clinical history this appears to be a malignant effusion. However, her WBC is elevated at 27 and she is anemic raining the possibility of an empyema or a hemothorax. After meeting with her and her son, plan to proceed to the OR for a Left VATS, pleural biopsies etc.  She is high risk due to anemia, thrombocytopenia and overall poor condition. Plt count is above 50 so will proceed with limited VATS. Would strongly consider a PRBC transfusion. Thank you for allowing us to care for Mrs. Colletta Amis

## 2020-10-30 NOTE — PROGRESS NOTES
[address] [facility] Adult  Hospitalist Group Hospitalist Progress Note Toribio Arroyo MD 
Answering service: [phone] from in house phone Date of Service:  10/30/2020 NAME:  Barbara Wagner :  1945 MRN:  [id number] Admission Summary:  
Barbara Wagner is a 70 y.o. female with past medical history of left breast cancer, s/p radiation, and childhood asthma presented to the ED from home with chief complaint of SOB, leg swelling, and weight loss. Symptoms reportedly have been gradual in onset, worsening, now severe, constant, with SOB, LOPEZ, orthopnea. Patient reported no known chronic medical conditions. There were no reports of recent sick contacts or exposure to anyone with COVID 19. On arrival in the ED tonight, initial recorded vital signs were BP = 140/93, HR= 122, RR = 26, O2sat = 89% on room air. Patient was initially placed on 2 lites O2 via nasal cannula (NC). However per ED MD report, patient has becoming increasing dyspneic, with increased supplemental oxygen requirement. Patient had no known prior lung disease. CTA chest revealed a large left pleural effusion with left lung atelectasis. Labs revealed WBC = 27.2, neutrophils = 2%, and absolute neutrophil count = 0.5. Hemoglobin = 6.4 (with no comparison lab available for review). ED offered PRBC transfusion but patient reportedly refused. She was started on Levofloxacin 750 mg IV for antibiotic coverage. Patient is now seen for admission to the hospitalist service. 
  
 
Interval history / Subjective:  
  F/u SOB Was on bipap, now on HI flow 1 unit PRBC yesterday Lethargic on bed Assessment & Plan:  
 
Large left pleural effusion 
-sec to ?likely malignancy -CTA chest 10/30 Large left-sided pleural effusion with left lung atelectasis. Left to right midline shift. There is mediastinal and hilar adenopathy which is extensive. -Appreciate discussion with thoracic surgery, OR today Acute respiratory failure with hypoxia 
-multifactorial including pleural effusion, anemia, ?malignancy - Appreciate Pulmonary Symptomatic anemia 
- s/p 1 unit PRBC 10/30 am 
 - repeat H/H 
-Follow labs -Transfuse for hb<7 
  
SIRS (systemic inflammatory response syndrome) - with noted leukocytosis, tachycardia, hypoxia, tachypnea 
- order sepsis protocol; check blood cultures 
-On Zosyn, continue 
 - continue IV fluids 
  
  
Splenomegaly/retroperitoneal lymphadenopathy/thrombocytopenia/neutropenia/Weight loss 
-CT abd 10/30 There is massive splenomegaly with extensive retroperitoneal adenopathy. Findings are consistent with lymphoproliferative disorder 
- massive splenomegaly 
-Awaiting Hematology Moderate protein calorie malnutrition 
-Nutrition consult History of left breast cancer 
-s/p radiation 
  
NPO Code status: FULL CODE. Would involve Palliative care DVT prophylaxis: scd PTA: home with son Baseline: Independent Plan: Follow Hematology/thoracic surgery Care Plan discussed with: Patient/Family Anticipated Disposition: TBD Anticipated Discharge: Greater than 48 hours Hospital Problems  Date Reviewed: 10/30/2020 Codes Class Noted POA Leukocytosis ICD-10-CM: G96.022 ICD-9-CM: 288.60  10/30/2020 Unknown Neutropenia ([facility] ICD-10-CM: D70.9 ICD-9-CM: 288.00  10/30/2020 Unknown Anemia ICD-10-CM: D64.9 ICD-9-CM: 285.9  10/30/2020 Unknown Tachycardia ICD-10-CM: R00.0 ICD-9-CM: 785.0  10/30/2020 Unknown Thrombocytopenia ([facility] ICD-10-CM: D69.6 ICD-9-CM: 287.5  10/30/2020 Unknown SIRS (systemic inflammatory response syndrome) (HCC) ICD-10-CM: R65.10 ICD-9-CM: 995.90  10/30/2020 Unknown Pleural effusion, left ICD-10-CM: J90 ICD-9-CM: 511.9  10/30/2020 Unknown * (Principal) Acute respiratory failure with hypoxia ([facility] ICD-10-CM: J96.01 
ICD-9-CM: 518.81  10/30/2020 Yes Review of Systems: A comprehensive review of systems was negative except for that written in the HPI. Vital Signs:  
 Last 24hrs VS reviewed since prior progress note. Most recent are: 
Visit Vitals /77 Pulse (!) 119 Temp 98.2 °F (36.8 °C) Resp 19 SpO2 100% Intake/Output Summary (Last 24 hours) at 10/30/2020 1111 Last data filed at 10/30/2020 1021 Gross per 24 hour Intake 727.08 ml Output  Net 727.08 ml Physical Examination:  
 
 
     
Constitutional:  cachectic looking female, lethargic ENT:  Oral mucosa moist, oropharynx benign. Resp:  CTA bilaterally. No wheezing/rhonchi/rales. No accessory muscle use CV:  Regular rhythm, normal rate, no murmurs, gallops, rubs GI:  Soft, non distended, non tender. normoactive bowel sounds, no hepatosplenomegaly Musculoskeletal:  No edema, warm, 2+ pulses throughout Neurologic:  Moves all extremities. AAOx3, CN II-XII reviewed Skin:  Good turgor, no rashes or ulcers Data Review:  
 Review and/or order of tests in the radiology section of CPT Labs:  
 
Recent Labs 10/30/20 
0031 WBC 27.2* HGB 6.4* HCT 23.6* PLT 68* Recent Labs 10/30/20 
0031   
K 3.8  CO2 30 BUN 19  
CREA 0.72 * CA 8.5 Recent Labs 10/30/20 
0031 ALT 14  
* TBILI 0.4 TP 7.0 ALB 2.7*  
GLOB 4.3* No results for input(s): INR, PTP, APTT, INREXT in the last 72 hours. No results for input(s): FE, TIBC, PSAT, FERR in the last 72 hours. No results found for: FOL, RBCF No results for input(s): PH, PCO2, PO2 in the last 72 hours. Recent Labs 10/30/20 
0031 TROIQ <0.05  
 
 No results found for: CHOL, CHOLX, CHLST, CHOLV, HDL, HDLP, LDL, LDLC, DLDLP, TGLX, TRIGL, TRIGP, CHHD, CHHDX No results found for: [facility] No results found for: COLOR, APPRN, SPGRU, REFSG, TONNY, PROTU, GLUCU, KETU, BILU, UROU, ALEX, LEUKU, GLUKE, EPSU, BACTU, WBCU, RBCU, CASTS, UCRY Medications Reviewed:  
 
Current Facility-Administered Medications Medication Dose Route Frequency  0.9% sodium chloride infusion 250 mL  250 mL IntraVENous PRN  piperacillin-tazobactam (ZOSYN) 3.375 g in 0.9% sodium chloride (MBP/ADV) 100 mL  3.375 g IntraVENous Q8H  
 0.9% sodium chloride infusion  125 mL/hr IntraVENous CONTINUOUS  
 sodium chloride (NS) flush 5-40 mL  5-40 mL IntraVENous Q8H  
 sodium chloride (NS) flush 5-40 mL  5-40 mL IntraVENous PRN  
 acetaminophen (TYLENOL) tablet 650 mg  650 mg Oral Q6H PRN Or  
 acetaminophen (TYLENOL) suppository 650 mg  650 mg Rectal Q6H PRN  polyethylene glycol (MIRALAX) packet 17 g  17 g Oral DAILY PRN No current outpatient medications on file.  
 
______________________________________________________________________ EXPECTED LENGTH OF STAY: 4d 21h ACTUAL LENGTH OF STAY:          0 Maria Dolores Redman MD

## 2020-10-30 NOTE — ED NOTES
Verbal shift change report given to [address] [place] [address] (oncoming nurse) by Sisi Chowdhury RN (offgoing nurse). Report included the following information SBAR, ED Summary, Procedure Summary, Intake/Output, MAR and Recent Results.

## 2020-10-30 NOTE — ED NOTES
Called RT to place pt on hi flow NC per Dr. Balbuena Retort orders as the pt is reporting discomfort

## 2020-10-30 NOTE — PROGRESS NOTES
Spiritual Care Assessment/Progress Note [address] 
 
 
NAME: Susie Samayoa      MRN: [id number] AGE: 70 y.o. SEX: female Zoroastrianism Affiliation: Unknown Language: English  
 
10/30/2020 Spiritual Assessment [facility] [place] [address] [facility] through conversation with: 
  
    []Patient        [x] Family    [] Friend(s) Reason for Consult: Palliative Care, Initial/Spiritual Assessment Spiritual beliefs: (Please include comment if needed) [x] Identifies with a audi tradition:     
   [] Supported by a audi community:        
   [] Claims no spiritual orientation:       
   [] Seeking spiritual identity:            
   [] Adheres to an individual form of spirituality:       
   [] Not able to assess:                   
 
    
Identified resources for coping:  
   [x] Prayer                           
   [] Music                  [] Guided Imagery [x] Family/friends                 [] Pet visits [] Devotional reading                         [] Unknown 
   [] Other:                                         
 
 
Interventions offered during this visit: (See comments for more details) Patient Interventions: Initial/Spiritual assessment, patient floor, Prayer (assurance of), Affirmation of emotions/emotional suffering Family/Friend(s): Affirmation of emotions/emotional suffering, Catharsis/review of pertinent events in supportive environment, Initial Assessment, Prayer (assurance of) Plan of Care: 
 
 [x] Support spiritual and/or cultural needs  
 [] Support AMD and/or advance care planning process    
 [] Support grieving process 
 [] Coordinate Rites and/or Rituals  
 [] Coordination with community clergy [] No spiritual needs identified at this time 
 [] Detailed Plan of Care below (See Comments)  [] Make referral to Music Therapy 
[] Make referral to Pet Therapy    
[] Make referral to Addiction services 
[] Make referral to [facility] [] Make referral to Spiritual Care Partner 
[] No future visits requested       
[x] Follow up visits as needed Comments: Visited Ms Johnathan Harris in ED-23 for initial Palliative Care spiritual assessment. Ms Johnathan Harris was sitting up on the stretcher; her eyes were open but she did not attempt to speak and was receiving oxygen per mask. Patient's family member was at her bedside. Provided pastoral presence and active listening as family member said that they had no specific needs at that time. He accepted 's offer to keep them in prayer. Assured them of ongoing  availability for support. : . Alexander Paiz. Kendell Gregory; [facility], to contact [phone] call: [phone]

## 2020-10-30 NOTE — CONSULTS
Thoracic Surgery Consultation Admit Date: 10/30/2020 Reason for Consultation: Large Left pleural effusion HPI: 
Kristine Ruiz is a 70 y.o. female with PMH of left breast neoplasm s/p lumpectomy & radiation (2006) and as noted below who we are asked to see in Thoracic Surgery consultation for a large left pleural effusion. She states that for the past 2-3 weeks she has been experiencing increasing shortness of breath, associated with weight loss & leg swelling. Due to persistent symptoms, she presented to [facility] ED last night from home. She reports that she has never had a pleural effusion before. Very remote history of smoking. Currently receiving transfusion for Hbg 6.4 CT Chest (10/30/2020) shows: 
IMPRESSION:  
There is no pulmonary embolism. There is no aortic aneurysm or dissection. Large left-sided pleural effusion with left lung atelectasis. Left to right midline shift. There is mediastinal and hilar adenopathy which is 
extensive. Neoplastic etiology should be considered. Patient Active Problem List  
 Diagnosis Date Noted  Leukocytosis 10/30/2020  Neutropenia ([address] 10/30/2020  Anemia 10/30/2020  Tachycardia 10/30/2020  Thrombocytopenia ([address] 10/30/2020  SIRS (systemic inflammatory response syndrome) ([address] 10/30/2020  Pleural effusion, left 10/30/2020  Acute respiratory failure with hypoxia ([address] 10/30/2020 No past medical history on file. No past surgical history on file. Social History Tobacco Use  Smoking status: Not on file Substance Use Topics  Alcohol use: Not on file No family history on file. Prior to Admission medications Not on File No Known Allergies Subjective:  
 
Review of Systems: A comprehensive review of systems was negative except for that written in the History of Present Illness. Objective:  
 
Blood pressure 127/77, pulse (!) 119, temperature 98.2 °F (36.8 °C), resp. rate 19, SpO2 100 %. Recent Results (from the past 24 hour(s)) EKG, 12 LEAD, INITIAL Collection Time: 10/30/20 12:04 AM  
Result Value Ref Range Ventricular Rate 108 BPM  
 Atrial Rate 108 BPM  
 P-R Interval 124 ms QRS Duration 70 ms Q-T Interval 298 ms QTC Calculation (Bezet) 399 ms Calculated P Axis 50 degrees Calculated R Axis 49 degrees Calculated T Axis 49 degrees Diagnosis Sinus tachycardia Septal infarct , age undetermined No previous ECGs available CBC WITH AUTOMATED DIFF Collection Time: 10/30/20 12:31 AM  
Result Value Ref Range WBC 27.2 (H) 3.6 - 11.0 K/uL  
 RBC 2.79 (L) 3.80 - 5.20 M/uL HGB 6.4 (L) 11.5 - 16.0 g/dL HCT 23.6 (L) 35.0 - 47.0 % MCV 84.6 80.0 - 99.0 FL  
 MCH 22.9 (L) 26.0 - 34.0 PG  
 MCHC 27.1 (L) 30.0 - 36.5 g/dL RDW 20.8 (H) 11.5 - 14.5 % PLATELET 68 (L) 866 - 400 K/uL MPV 9.6 8.9 - 12.9 FL  
 NRBC 0.8 (H) 0  WBC ABSOLUTE NRBC 0.23 (H) 0.00 - 0.01 K/uL NEUTROPHILS 2 (L) 32 - 75 % LYMPHOCYTES 98 (H) 12 - 49 % MONOCYTES 0 (L) 5 - 13 % EOSINOPHILS 0 0 - 7 % BASOPHILS 0 0 - 1 % IMMATURE GRANULOCYTES 0 %  
 ABS. NEUTROPHILS 0.5 (L) 1.8 - 8.0 K/UL  
 ABS. LYMPHOCYTES 26.7 (H) 0.8 - 3.5 K/UL  
 ABS. MONOCYTES 0.0 0.0 - 1.0 K/UL  
 ABS. EOSINOPHILS 0.0 0.0 - 0.4 K/UL  
 ABS. BASOPHILS 0.0 0.0 - 0.1 K/UL  
 ABS. IMM. GRANS. 0.0 K/UL  
 DF MANUAL    
 RBC COMMENTS ANISOCYTOSIS 2+ 
    
 RBC COMMENTS HYPOCHROMIA 2+ 
    
 RBC COMMENTS ATYPICAL LYMPHOCYTES PRESENT 
MICROCYTOSIS 1+ 
    
 RBC COMMENTS OVALOCYTES 1+ RBC COMMENTS SMUDGE CELLS SEEN 
POLYCHROMASIA PRESENT 
    
 RBC COMMENTS SCHISTOCYTES PRESENT Pathologist review Pathology Review Requested TROPONIN I Collection Time: 10/30/20 12:31 AM  
Result Value Ref Range Troponin-I, Qt. <0.05 <0.05 ng/mL SAMPLES BEING HELD Collection Time: 10/30/20 12:31 AM  
Result Value Ref Range SAMPLES BEING HELD 1red,1blu COMMENT Add-on orders for these samples will be processed based on acceptable specimen integrity and analyte stability, which may vary by analyte. METABOLIC PANEL, COMPREHENSIVE Collection Time: 10/30/20 12:31 AM  
Result Value Ref Range Sodium 142 136 - 145 mmol/L Potassium 3.8 3.5 - 5.1 mmol/L Chloride 106 97 - 108 mmol/L  
 CO2 30 21 - 32 mmol/L Anion gap 6 5 - 15 mmol/L Glucose 115 (H) 65 - 100 mg/dL BUN 19 6 - 20 MG/DL Creatinine 0.72 0.55 - 1.02 MG/DL  
 BUN/Creatinine ratio 26 (H) 12 - 20 GFR est AA >60 >60 ml/min/1.73m2 GFR est non-AA >60 >60 ml/min/1.73m2 Calcium 8.5 8.5 - 10.1 MG/DL Bilirubin, total 0.4 0.2 - 1.0 MG/DL  
 ALT (SGPT) 14 12 - 78 U/L  
 AST (SGOT) 26 15 - 37 U/L Alk. phosphatase 122 (H) 45 - 117 U/L Protein, total 7.0 6.4 - 8.2 g/dL Albumin 2.7 (L) 3.5 - 5.0 g/dL Globulin 4.3 (H) 2.0 - 4.0 g/dL A-G Ratio 0.6 (L) 1.1 - 2.2 NT-PRO BNP Collection Time: 10/30/20 12:31 AM  
Result Value Ref Range NT pro- (H) <125 PG/ML  
TYPE & SCREEN Collection Time: 10/30/20 12:43 AM  
Result Value Ref Range Crossmatch Expiration 11/02/2020,2359 ABO/Rh(D) A POSITIVE Antibody screen NEG Unit number [id number] Blood component type RC LR Unit division 00 Status of unit ISSUED Crossmatch result Compatible RBC, ALLOCATE Collection Time: 10/30/20  1:30 AM  
Result Value Ref Range HISTORY CHECKED? Historical check performed LACTIC ACID Collection Time: 10/30/20  2:07 AM  
Result Value Ref Range Lactic acid 1.2 0.4 - 2.0 MMOL/L  
ECHO ADULT COMPLETE Collection Time: 10/30/20 10:14 AM  
Result Value Ref Range IVSd 0.60 0.6 - 0.9 cm LVIDd 4.77 3.9 - 5.3 cm LVIDs 3.31 cm  
 LVOT d 1.44 cm  
 LVPWd 0.58 (A) 0.6 - 0.9 cm  
 BP EF 49.2 (A) 55 - 100 percent LV Ejection Fraction MOD 2C 50 percent LV Ejection Fraction MOD 4C 54 percent LV ED Vol A2C 73.54 mL LV ED Vol A4C 55.81 mL  
 LV ED Vol BP 69.56 56 - 104 mL  
 LV ES Vol A2C 36.63 mL  
 LV ES Vol A4C 25.41 mL  
 LV ES Vol BP 35.33 19 - 49 mL  
 LVOT Peak Gradient 5.08 mmHg LVOT Peak Velocity 112.67 cm/s RVIDd 3.27 cm  
 RVSP 70.20 mmHg Left Atrium Major Axis 3.66 cm  
 LA Volume 65.41 22 - 52 mL  
 LA Area 4C 24.46 cm2 LA Vol 2C 46.23 22 - 52 mL  
 LA Vol 4C 74.51 (A) 22 - 52 mL Est. RA Pressure 12.00 mmHg Aortic Valve Area by Continuity of Peak Velocity 1.14 cm2 AoV PG 10.15 mmHg Aortic Valve Systolic Peak Velocity 445.90 cm/s  
 MV A Vance 107.20 cm/s Mitral Valve E Wave Deceleration Time 192.34 ms MV E Vance 77.65 cm/s Mitral Valve Pressure Half-time 55.78 ms  
 MVA (PHT) 3.94 cm2 Pulmonic Valve Systolic Peak Instantaneous Gradient 3.05 mmHg Tapse 1.65 1.5 - 2.0 cm Triscuspid Valve Regurgitation Peak Gradient 58.20 mmHg  
 TR Max Velocity 381.46 cm/s Ao Root D 2.35 cm IVC proximal 1.57 cm  
 MV E/A 0.72   
 LV Mass AL 85.6 67 - 162 g  
 
_____________________ Physical Exam:  
 
General:  Alert, cooperative, no distress, appears stated age. Very thin Eyes:   Sclera clear. Throat: Lips, mucosa, and tongue normal.  
Neck: Supple, symmetrical, trachea midline. Lungs:   Dec BS left > right. O2 sat 100% on 10L hi flow. Heart:  Regula rhythm, tachy. Abdomen:   Soft, non-tender, flat Extremities: Extremities thin w muscle wasting. Skin: Skin w/d/i Assessment:  
Principal Problem: 
  Acute respiratory failure with hypoxia ([address] (10/30/2020) Active Problems: 
  Leukocytosis (10/30/2020) Neutropenia ([address] (10/30/2020) Anemia (10/30/2020) Tachycardia (10/30/2020) Thrombocytopenia ([address] (10/30/2020) SIRS (systemic inflammatory response syndrome) ([address] (10/30/2020) Pleural effusion, left (10/30/2020) Plan:  
 
NPO Transfuse as ordered O2 as needed Plan for Left VATS w pleural biopsies, possible talc pleurodesis later today. Thank you for including us in the care of your patient Signed By: JENNIE Giles October 30, 2020

## 2020-10-30 NOTE — ED NOTES
Pt states she does not like the bipap machine and that it is very uncomfortable. Pt wants to be switched back to the nasal cannula.  Will consult with MD.

## 2020-10-31 NOTE — PROGRESS NOTES
Mrs. Jhonathan Rios is POD#1 after a left VATS, pleural biopsies, talc pleurodesis and drainage of a >3L effusion. She remains intubated and on pressors. Afebrile  on [place] gtt CT no air leak, 850 serous since the OR On exam she is sedate Intubated Head and neck veins less distended Moves all four extremities when sedation weaned Lungs CTA b/l Dressing c/d/i 
rrr CXR- much better expansion on left side, effusion resolved, mediastinal lymphadenopathy WBC 45.2 Hgb 8.9 Plts 69 Diagnoses  1- Acute hypoxic respiratory failure  2: Malignant pleural effusion  3: mediastinal lymphadenopathy  4: Multisystem organ dysfunction Mrs. Jhonathan Rios remains in critical condition. Path pending but suspected Stage IV cancer. Family caught off guard by her rapid decline. Leave CT to suction. Greatly appreciate the care of the ICU team for her MSOF.

## 2020-10-31 NOTE — PROGRESS NOTES
0305: Consult called to [address]. 1100: Son Robert Cabral at bedside, provided RN with patient contact phone numbers, he states he is the primary point of contact. Son also provided pt's PCP phone numbers: Dr. Anne Perez (work: [phone]), (cell: [phone]). Other numbers placed in Patient Demographics on [employer].

## 2020-10-31 NOTE — PERIOP NOTES
TRANSFER - OUT REPORT: 
 
Verbal report given to Sydnee Garcia(name) on [employer]  being transferred to [facility](unit) for routine post - op Report consisted of patients Situation, Background, Assessment and  
Recommendations(SBAR). Time Pre op antibiotic given:*2122 Anesthesia Stop time: 2000 Hodges Present on Transfer to floor:yes Order for Hodges on Chart:yes Discharge Prescriptions with Chart:no Information from the following report(s) SBAR, ED Summary, Procedure Summary, Intake/Output, Recent Results, Cardiac Rhythm NSR and Pre Procedure Checklist was reviewed with the receiving nurse. Opportunity for questions and clarification was provided. Is the patient on 02? YES 
     L/Min 10 Other ET Is the patient on a monitor? YES Is the nurse transporting with the patient? YES Surgical Waiting Area notified of patient's transfer from PACU? YES, MESSAGE WAS LEFT ON SON ANSWERING MACHINE The following personal items collected during your admission accompanied patient upon transfer:  
Dental Appliance: Dental Appliances: Uppers Vision:   
Hearing Aid:   
Jewelry: Jewelry: Earrings, Bracelet, Necklace, Ring(given to son, Alix Blandon) Clothing:   
Other Valuables:   
Valuables sent to safe:

## 2020-10-31 NOTE — ANESTHESIA POSTPROCEDURE EVALUATION
Post-Anesthesia Evaluation and Assessment Patient: Clyde Abreu MRN: [id number]  SSN: [id number] YOB: 1945  Age: 70 y.o. Sex: female I have evaluated the patient and they are stable and ready for discharge from the PACU. Cardiovascular Function/Vital Signs Visit Vitals /64 (BP 1 Location: Left arm, BP Patient Position: At rest) Pulse 85 Temp 36.1 °C (97 °F) Resp 16 Ht 5' (1.524 m) Wt 54.4 kg (120 lb) SpO2 100% BMI 23.44 kg/m² Patient is status post General anesthesia for Procedure(s): LEFT VATS PLEURAL BIOPSIES POSSIBLE TALC PLEURODESIS/. Nausea/Vomiting: None Postoperative hydration reviewed and adequate. Pain: 
Pain Scale 1: FLACC (10/30/20 1900) Pain Intensity 1: 0 (10/30/20 1900) Managed Neurological Status:  
Neuro (WDL): Exceptions to WDL (10/30/20 1900) Neuro Neurologic State: Anesthetized (10/30/20 1900) Orientation Level: Unable to verbalize (10/30/20 1900) Cognition: No command following (10/30/20 1900) Speech: Intubated (10/30/20 1900) LUE Motor Response: Withdraws (10/30/20 1900) LLE Motor Response: Withdraws (10/30/20 1900) RUE Motor Response: Withdraws (10/30/20 1900) RLE Motor Response: Withdraws (10/30/20 1900) Sedated Mental Status, Level of Consciousness: Sedated Pulmonary Status:  
O2 Device: Ventilator (10/30/20 2000) Adequate oxygenation and airway patent Complications related to anesthesia: None Post-anesthesia assessment completed. Critically ill with respiratory failure Signed By: Mauro Bonner MD   
 October 30, 2020 Procedure(s): LEFT VATS PLEURAL BIOPSIES POSSIBLE TALC PLEURODESIS/. 
 
general 
 
<BSHSIANPOST> INITIAL Post-op Vital signs:  
Vitals Value Taken Time /64 10/30/2020  8:00 PM  
Temp 36.1 °C (97 °F) 10/30/2020  8:00 PM  
Pulse 87 10/30/2020  8:40 PM  
Resp 18 10/30/2020  8:40 PM  
SpO2 97 % 10/30/2020  8:40 PM  
 Vitals shown include unvalidated device data.

## 2020-10-31 NOTE — PROGRESS NOTES
0215-received pt into ICU 5 vented/sedated/restrained -escorted by 2 PACU RN's/RT -see assessment 0750-bedside report given to RN using sbar format

## 2020-10-31 NOTE — PROCEDURES
[facility] Procedure Note - Central Venous Access:  
Performed by BEV Turner Obtained informed Consent. Immediately prior to the procedure, the patient was reevaluated and found suitable for the planned procedure and any planned medications. Immediately prior to the procedure a time out was called to verify the correct patient, procedure, equipment, staff, and marking as appropriate. Central line Bundle: 
Full sterile barrier precautions used. 7-Step Sterility Protocol followed. (cap, mask sterile gown, sterile gloves, large sterile sheet, hand hygiene, 2% chlorhexidine for cutaneous antisepsis) 5 mL 1% Lidocaine placed at insertion site. Patient positioned in Trendelenburg?yes The site was prepped with ChloraPrep and Sterile draping. Using Seldinger technique a Quad Lumen CVC was placed in the Left, Subclavian Vein via direct cannulation with 3 number of attempts for Monitoring, Blood Drawing and IV Access for Vasopressors. Ultrasound Guidance was utilized. There was good dark, non-pulsatile blood return in all ports. Femoral Site? no. If Yes, reason femoral site was chosen: NA 
Catheter secured. Biopatch in place? no,  Surgicel and Quick Clot placed after site oozing. Sterile Bio-occlusive dressing placed. The following complications were encountered: Hematoma noted on US. A follow-up chest x-ray was ordered post procedure. The procedure was tolerated well. Assisted by BEV O[facility]

## 2020-10-31 NOTE — PROGRESS NOTES
[facility] 
 
ICU TEAM Consult Note Name: Eloina Singleton : 1945 MRN: [id number] Date: 10/31/2020 Assessment:  
 
ICU Problems: 1. Acute respiratory failure with hypoxia s/p intubation post op 2. Large left pleural effusion s/p VATS with CT placement with Thoracic Surgery. ?Malignancy, pleural sample sent for cytology. 3. Symptomatic anemia s/p 2 PRBCs 4. SIRS (systemic inflammatory response syndrome) 5. Leukocytosis 6. Thrombocytopenia 7. Edema of both lower extremities 8. Massive splenomegaly with retroperitoneal adenopathy on CT abd/pelvis findings concerning for possible lymphoproliferative disorder ICU Comprehensive Plan of Care:  
 
Plans for this Shift:  
 
1. Stay in  ICU 2. Daily CBC, BMP 3. Consult heme onc 4. F/U pending pleural fluid cytology and lung biopsy 5. Thoracic surgery following, appreciate input 6. Strict I&Os continue benítez 7. SBP Goal of: > 90 mmHg 8. MAP Goal of: > 65 mmHg 9. Phenylephrine - For above SBP/MAP goals 10. IVFs: LR@ 12 5ml/hr 11. Transfusion Trigger (Hgb): <7 g/dL 12. Respiratory Goals: 
a. Chlorhexidine  
b. Optimize PEEP/Ventilation/Oxygenation 
c. Goal Tidal Volume 6 cc/kg based on IBW 
d. Aim for lung protective ventilation 
e. Head of bed > 30 degrees 
f. Plan to Extubate: Plan for SBT tomorrow am 
13. Pulmonary toilet: Albuterol  PRN 14. SpO2 Goal: > 92% per vent, wean FiO2 as tolerated 15. Keep K>4; Mg>2 16. PT/OT: Will consult post extubation as needed 17. Discussed Plan of Care/Code Status: Full Code, Palliative care following, planning to meet with son on Monday. [address] [facility] [facility] 9. Appreciate Consultants Input 19. Discussed Care Plan with Bedside RN 
20. Documentation of Current Medications 21. Rest of Plan Below: 
 
F - Feeding:  No  
A - Analgesia: Fentanyl S - Sedation: Propofol T - DVT Prophylaxis: SCD's or Sequential Compression Device H - Head of Bed: > 30 Degrees U - Ulcer Prophylaxis: Pepcid (famotidine) G - Glycemic Control: Insulin S - Spontaneous Breathing Trial: Pending B - Bowel Regimen: MiraLax I - Indwelling Catheter: 
 Tubes: ETT, Chest Tube left and Orogastric Tube Lines: Peripheral IV and Arterial Line Drains: Hodges Catheter D - De-escalation of Antibiotics: Zosyn Subjective:  
Progress Note: 10/31/2020 Patient is asked to be seen by Dr. Barbara Garcia for, post VATS with respiratory failure care necessitating the need for possible ICU care. Reason for ICU Admission: Post VATS  
HPI: 
Vernon Dacosta is a 70 y. o. female with past medical history of left breast cancer, s/p radiation, and childhood asthma presented to the ED from home on 10/30/20 with chief complaint of SOB, leg swelling, and weight loss. Symptoms reportedly have been gradual in onset, worsening, now severe, constant, with SOB, LOPEZ, orthopnea. Patient reported no known chronic medical conditions. There were no reports of recent sick contacts or exposure to anyone with COVID 19. Per ED MD report, patient has became increasingly dyspneic, with increased supplemental oxygen requirement. Patient had no known prior lung disease. CTA chest revealed a large left pleural effusion with left lung atelectasis. Labs revealed WBC = 27.2, neutrophils = 2%, and absolute neutrophil count = 0.5.  Hemoglobin = 6.4 (with no comparison lab available for review). ED offered PRBC transfusion but patient reportedly refused initally.  She was started on Levofloxacin 750 mg IV for antibiotic coverage. Was admitted under the hospitalist service. CTS was consulted and plan for VATS later in day. Pulmonary was also consulted as well as Palliative care who spoke with patient and son. Agreed for blood products. She was given 2 units PRBCs and then taken to OR for Left VATS. While in OR patient was intubated for respiratory instability and procedure.  Hemodynamically unstable requiring low dose geovanna and arterial line was placed. Left VATS with pleural biopsis was completed and patient was taken to PACU intubated. Critical care was consulted for post op management. Patient in PACU withdraws on all ext. Left chest tube with sanguinous drainage. Patient on 60 mcg/min of Stuart synephrine via PIV, may need CVL placed if increased pressor requirements. Patient to remain intubated overnight. Awaiting bed in ICU. POD: 
Day of Surgery S/P:  
Procedure(s): LEFT VATS PLEURAL BIOPSIES POSSIBLE TALC PLEURODESIS/ Active Problem List:  
 
Problem List  Date Reviewed: 10/30/2020 Codes Class Leukocytosis ICD-10-CM: K77.232 ICD-9-CM: 288.60 Neutropenia ([facility] [address] ICD-10-CM: D70.9 ICD-9-CM: 288.00 Anemia ICD-10-CM: D64.9 ICD-9-CM: 285.9 Tachycardia ICD-10-CM: R00.0 ICD-9-CM: 150. 0 Thrombocytopenia ([facility] [address] ICD-10-CM: D69.6 ICD-9-CM: 287.5 SIRS (systemic inflammatory response syndrome) (HCC) ICD-10-CM: R65.10 ICD-9-CM: 995.90 Pleural effusion, left ICD-10-CM: J90 ICD-9-CM: 511.9 * (Principal) Acute respiratory failure with hypoxia (HCC) ICD-10-CM: J96.01 
ICD-9-CM: 518.81 Past Medical History:  
 
 has no past medical history on file. Past Surgical History:  
 
 has no past surgical history on file. Home Medications:  
 
Prior to Admission medications Not on File Allergies/Social/Family History: No Known Allergies Social History Tobacco Use  Smoking status: Not on file Substance Use Topics  Alcohol use: Not on file History reviewed. No pertinent family history. Review of Systems:  
 
Review of systems not obtained due to patient factors. Objective:  
Vital Signs: 
Visit Vitals BP (!) 105/53 Pulse 94 Temp 99.8 °F (37.7 °C) Resp 16 Ht 5' (1.524 m) Wt 56.4 kg (124 lb 5.4 oz) SpO2 100% BMI 24.28 kg/m²  O2 Flow Rate (L/min): 15 l/min O2 Device: Endotracheal tube, Ventilator Temp (24hrs), Av.8 °F (36.6 °C), Min:97 °F (36.1 °C), Max:99.8 °F (37.7 °C) Intake/Output:  
 
Intake/Output Summary (Last 24 hours) at 10/31/2020 7840 Last data filed at 10/31/2020 6910 Gross per 24 hour Intake 6112.16 ml Output 3430 ml Net 2682.16 ml Physical Exam: 
 
General:  appears stated age, intubated and sedated Eye:  conjunctivae/corneas clear. Pupils round and reactive Neurologic:  Limited, sedated. Withdraws to pain Lymphatic:  Cervical, supraclavicular, and axillary nodes normal.  
Neck:  normal and no erythema or exudates noted. Lungs: Clear to auscultation Heart:  regular rate and rhythm, S1, S2 normal, no murmur, click, rub or gallop Abdomen:  soft, mild distension. Hypoactive bowel sounds normal. No masses, no organomegaly Cardiovascular:  Regular rate and rhythm, S1S2 present, without murmur or extra heart sounds, pedal pulses normal and no edema Skin:  Normal. and no rash or abnormalities LABS AND  DATA: Personally reviewed Recent Labs 10/31/20 
0555 10/31/20 
0135 WBC 45.2* 43.8* HGB 8.9* 8.8* HCT 31.7* 31.2*  
PLT 69* 69* Recent Labs 10/31/20 
0555 10/30/20 
2006 * 144  
K 4.1 3.9 * 111* CO2 26 27 BUN 24* 22* CREA 0.66 0.62 GLU 65 86  
CA 7.4* 7.3*  
MG 2.0 2.0 PHOS 3.9 3.7 Recent Labs 10/31/20 
0555 10/30/20 
2006 AP 87 105  
TP 5.2* 5.8* ALB 2.3* 2.2*  
GLOB 2.9 3.6 Recent Labs 10/30/20 
2006 INR 1.4* PTP 14.1* No results for input(s): PHI, PCO2I, PO2I, FIO2I in the last 72 hours. Recent Labs 10/30/20 
0031 TROIQ <0.05 Hemodynamics:  
PAP:   CO:    
Wedge:   CI:    
CVP:    SVR: BP 2: 865/21(8388) (10/30/20 1019) PVR:    
 
Ventilator Settings: 
Mode Rate Tidal Volume Pressure FiO2 PEEP Assist control, Volume control   350 ml    60 % 4 cm H20 Peak airway pressure: 23 cm H2O Minute ventilation: 5.83 l/min MEDS: Reviewed Chest X-Ray: CXR Results  (Last 48 hours) 10/31/20 0017  XR CHEST PORT Final result Impression:  IMPRESSION:  
   
Left-sided central venous access catheter without pneumothorax. Catheter in  
appropriate position for use. Extensive left lung parenchymal opacity with improved left-sided effusion. Interval left pleural drainage catheter placement. Mediastinal and hilar adenopathy with left hilar mass lesion/pneumonia. Cardiomegaly. .   
   
   
   
  
 Narrative:  Clinical history: central line placement INDICATION:   central line placement COMPARISON: 10/30/2020 FINDINGS:  
FINDINGS:  
AP portable upright view of the chest demonstrates a large cardiopericardial  
silhouette. Moderate to large left-sided pleural effusion. Extensive left lung  
parenchymal opacity with atelectasis and consolidation. Right parenchymal and  
interstitial opacity. ET tube in appropriate position. NG tube in appropriate  
position. Central venous access catheter tip in appropriate position for use. No  
pneumothorax. . Mediastinal and hilar adenopathy. Left lung atelectasis/hilar  
mass lesion. Patient is on a cardiac monitor. 10/30/20 0107  XR CHEST PORT Final result Impression:  IMPRESSION:  
Extensive left lung parenchymal opacity with parapneumonic effusion. Mediastinal adenopathy is suspected. Cardiomegaly. .   
   
   
  
 Narrative:  Clinical history: short of breath INDICATION:   short of breath COMPARISON: None FINDINGS:  
AP portable upright view of the chest demonstrates a large cardiopericardial  
silhouette. Moderate to large left-sided pleural effusion. Extensive left lung  
parenchymal opacity with atelectasis and consolidation. .Patient is on a cardiac  
monitor. CT Results  (Last 48 hours) 10/30/20 0340  CT ABD PELV WO CONT Final result Impression:  IMPRESSION:  
There is massive splenomegaly with extensive retroperitoneal adenopathy. Findings are consistent with lymphoproliferative disorder. Narrative:  CLINICAL HISTORY: Possible neoplasm. INDICATION: ? cancer COMPARISON:  
CONTRAST:  None. TECHNIQUE:   
Thin axial images were obtained through the abdomen and pelvis. Coronal and  
sagittal reformats were generated. Oral contrast was not administered. CT dose  
reduction was achieved through use of a standardized protocol tailored for this  
examination and automatic exposure control for dose modulation. The absence of intravenous contrast material reduces the sensitivity for  
evaluation of visceral organs and vasculature including presence of small mass  
lesions, hemodynamically significant stenoses, dissections, mucosal  
abnormalities etc.  
   
FINDINGS:   
LOWER THORAX: Mediastinal and hilar lymphadenopathy. Large left-sided pleural  
effusion with atelectatic change of the left lung. LIVER/GALLBLADDER: No mass. Gallbladder is within normal limits. CBD is not  
dilated. SPLEEN/PANCREAS:  There is massive splenomegaly. Massive retroperitoneal  
lymphadenopathy. ADRENALS/KIDNEYS: Left kidney is displaced anteriorly and to the left. No  
calculus or hydronephrosis. STOMACH: Effaced. SMALL BOWEL/COLON: Limited evaluation. Extensive fecal stasis. APPENDIX: Not visualized. PERITONEUM: There is minimal ascites present. RETROPERITONEUM: Extensive retroperitoneal adenopathy encases the abdominal  
aorta. REPRODUCTIVE ORGANS: Limited evaluation. URINARY BLADDER: No mass or calculus. BONES: No destructive bone lesion. ADDITIONAL COMMENTS: N/A  
   
  
 10/30/20 0222  CTA CHEST W OR W WO CONT Final result Impression:  IMPRESSION:   
There is no pulmonary embolism. There is no aortic aneurysm or dissection. Large left-sided pleural effusion with left lung atelectasis. Left to right midline shift. There is mediastinal and hilar adenopathy which is  
extensive. Neoplastic etiology should be considered. Narrative:  Clinical history: SOB  
INDICATION:   SOB  
COMPARISON: None TECHNIQUE: CT of the chest with  IV contrast , Isovue-370 is performed. Axial  
images from the thoracic inlet to the level of the upper abdomen are obtained. Manual post-processing of the images and coronal reformatting is also performed. CT dose reduction was achieved through use of a standardized protocol tailored  
for this examination and automatic exposure control for dose modulation. Multiplanar reformatted imaging was performed. Sagittal and coronal reformatting. 3-D Postprocessing of imaging was performed. 3-D MIP reconstructed images were obtained in the coronal plane. FINDINGS:   
There is no pulmonary embolism. There is no aortic aneurysm or dissection. There is a large left-sided pleural effusion. Atelectasis/consolidation of the  
left lung. Mediastinal and hilar adenopathy. Right hilar adenopathy. The aorta  
is normal in course and caliber. The proximal pulmonary arteries are without  
evidence of filling defects. No lytic or blastic lesions are identified. The  
remainder of the upper abdomen visualized is unremarkable. Mild dextroscoliosis. Small nodular density on the right adjacent to the fissures most likely  
atelectatic in nature. ECHO: 10/30/20 Interpretation Summary Result status: Final result · LV: Estimated LVEF is 45 - 50%. Normal cavity size and wall thickness. Inconclusive left ventricular diastolic function. · LA: Moderately dilated left atrium. · MV: Mild mitral valve regurgitation is present. · TV: Moderate tricuspid valve regurgitation is present. · PA: Moderate pulmonary hypertension. · Pericardium: There is a large left pleural effusion. Multidisciplinary Rounds Completed:  Pending ABCDEF Bundle/Checklist Completed: 
Yes SPECIAL EQUIPMENT None DISPOSITION Stay in ICU 
 
 CRITICAL CARE CONSULTANT NOTE I had a face to face encounter with the patient, reviewed and interpreted patient data including clinical events, labs, images, vital signs, I/O's, and examined patient. I have discussed the case and the plan and management of the patient's care with the consulting services, the bedside nurses and the respiratory therapist.   
 
NOTE OF PERSONAL INVOLVEMENT IN CARE This patient has a high probability of imminent, clinically significant deterioration, which requires the highest level of preparedness to intervene urgently. I participated in the decision-making and personally managed or directed the management of the following life and organ supporting interventions that required my frequent assessment to treat or prevent imminent deterioration. I personally spent 55 minutes of critical care time. This is time spent at this critically ill patient's bedside actively involved in patient care as well as the coordination of care and discussions with the patient's family. This does not include any procedural time which has been billed separately. Alva Bender [facility] Critical Care Medicine [facility]

## 2020-10-31 NOTE — CONSULTS
Titus Zhu Name:  Hamilton Olszewski 
MR#:  [id number] :  1945 ACCOUNT #:  [de-identified] DATE OF SERVICE:  10/31/2020 REASON FOR CONSULTATION:  Leukocytosis, possible lymphoma. REFERRING PHYSICIAN:  Dr. Sue Acevedo. HISTORY OF PRESENT ILLNESS:  The patient is a 68-year-old [facility] [place] female who came to the emergency room with shortness of breath, some weight loss. She eventually had a CT scan of her chest, abdomen, and pelvis. The chest did not show any PE. There was a large left-sided pleural effusion, left to right midline shift, mediastinal or hilar adenopathy. CT scan of her abdomen and pelvis showed massive splenomegaly with extensive retroperitoneal adenopathy consistent with some type of lymphoproliferative disorder. The patient was actually taken to the OR yesterday by Dr. Sue Acevedo. Dr. Sue Acevedo did a VATS procedure with multiple pleural and diaphragmatic biopsies. Of note, the patient did have a history of breast cancer back in , was treated with surgery and radiation there. The patient did not need any chemo. She is now intubated in the unit and we are asked to see her for further evaluation. PAST MEDICAL HISTORY: 
1. Significant for the breast cancer as above. 2.  Asthma. MEDICATIONS PRIOR TO ADMISSION:  None. ALLERGIES:  NO KNOWN DRUG ALLERGIES. FAMILY HISTORY:  Unable to be obtained. REVIEW OF SYSTEMS:  Unable to be obtained due to the patient's condition. CURRENT MEDICATIONS:  She is on: 1. Famotidine. 2.  Zosyn. 3.  Vancomycin. 4.  She is on compressors. PHYSICAL EXAMINATION: 
GENERAL:  Intubated. VITAL SIGNS:  Temperature 99.1, pulse 91, blood pressure 111/51, respirations 17 on a ventilator. NECK:  No cervical, supraclavicular, or axillary lymphadenopathy appreciated. CARDIOVASCULAR:  Regular. RESPIRATORY:  On a ventilator. ABDOMEN:  Soft, nontender. EXTREMITIES:  May be trace to 1+ lower extremity edema bilaterally. LABORATORY DATA:  White blood cell count 45.2, hemoglobin 8.9, platelets 69. Chemistry:  Sodium 146, BUN 24, creatinine 0.66. Total bili 0.7, ALT 9, AST 26, alk phos 87. IMPRESSION AND PLAN:  The patient is a 21-year-old with what appears to be some type of leukemia/lymphoma. I am wondering whether or not this may be chronic lymphocytic leukemia with the extent of lymphadenopathy, the splenomegaly, and the lymphocytosis. Peripheral smear was reviewed by the pathologist showing leukocytosis favoring a typical lymphocytosis, some thrombocytopenia. Flow cytometry is pending. We will see what that shows. We will see what the pathology from Dr. Liana Stewart video-assisted thoracoscopic surgery procedure shows. I talked with the patient's son who understands how sick his mom is. We will need to see what the pathology shows and make further recommendations once that is back. I think her thrombocytopenia is due to whatever process is going on here with malignancy. She got extensive splenomegaly, which could be causing this. I will send off some other lab work as well, and we will continue to follow along with you. MD YAHAIRA [email or web address] 
D:  10/31/2020 13:20 
T:  10/31/2020 17:42 JOB #:  [id number]

## 2020-10-31 NOTE — PROGRESS NOTES
TRANSFER - IN REPORT: 
 
Verbal report received from  
(name) on Kristine Ruiz  being received from ARMANDO bishop RN(unit) for routine progression of care Report consisted of patients Situation, Background, Assessment and  
Recommendations(SBAR). Information from the following report(s) Kardex, ED Summary, OR Summary, Procedure Summary, Intake/Output, MAR, Accordion, Med Rec Status and Cardiac Rhythm NSR was reviewed with the receiving nurse. Opportunity for questions and clarification was provided. Assessment completed upon patients arrival to unit and care assumed.

## 2020-10-31 NOTE — OP NOTES
[address]  
OPERATIVE REPORT Name:  Katarina Mckeon 
MR#:  [id number] :  1945 ACCOUNT #:  [de-identified] DATE OF SERVICE:  10/30/2020 CLINICAL SERVICE:  Thoracic Surgery. ATTENDING SURGEON:  Rudy Salmeron MD 
 
OPERATIONS PERFORMED: 1. Left video-assisted thoracoscopy. 2.  Multiple pleural and diaphragmatic biopsies. 3.  Talc pleurodesis. PREOPERATIVE DIAGNOSES: 
1. Acute hypoxic respiratory failure. 2.  Malignant pleural effusion. 3.  Mediastinal lymphadenopathy. 4.  History of breast cancer. POSTOPERATIVE DIAGNOSES: 
1. Acute hypoxic respiratory failure. 2.  Malignant pleural effusion. 3.  Mediastinal lymphadenopathy. 4.  History of breast cancer. FIRST ASSISTANT:  Caterina Alonzo. JENNIE Garcia 
 
SPECIMENS SENT: 
1. Left pleural fluid was sent for cytology. 2.  Left pleural biopsy sent to anatomic pathology. 3.  Left diaphragmatic implant sent to anatomic pathology. DRAINS AND TUBES:  One 28-Armenian chest tube was left within the left hemithorax. ANESTHESIA:  General with endotracheal intubation and bronchial blocker. ESTIMATED BLOOD LOSS:  For this case was minimal. 
 
INDICATIONS FOR PROCEDURE:  The patient is a 63-year-old lady who presented in acute hypoxic respiratory distress to the Emergency Department and was found to have a very large left pleural effusion with midline shift. She also had mediastinal lymphadenopathy and the major concern was for malignant pleural effusion. Due to her frailty and hypoxia, it was felt that draining the pleural effusion in the operating room with anesthesia coverage was safer. PROCEDURE IN DETAIL:  After informed consent was obtained and placed on the chart, the patient was taken to the operating room and placed supine on the table. General anesthesia with endotracheal intubation was induced without complication. Preoperative antibiotics were administered.   The patient was then placed 30 degrees left side up. The patient's left chest was prepped and draped in sterile fashion. Time-out was performed. Through the sixth intercostal space along the anterior axillary line, a 10-mm Thoracoport was placed. There was pressurized pleural fluid. There was a very large bloody pleural effusion. Nearly 3 liters of pleural fluid was evacuated. Representative sample sent for cytology. Upon inspection of the chest, there was studding along the pericardium and diaphragm and along the lateral pleural surface. Pleural biopsies were taken. Diaphragmatic biopsies were taken. These were sent to anatomic pathology. Hemostasis was ensured. The patient was hypoxic and had become hemodynamically unstable. We then performed a talc pleurodesis using 8 g sterile talc. Approximately 30 mL of 1% lidocaine mixed with 0.25% Marcaine was then used as local anesthetic to perform intercostal nerve block. A 28-Yoruba chest tube was placed posteriorly. The lung was re-inflated under direct visualization. The incision was closed in a multilayered fashion and covered with Dermabond. The patient was then reversed from general anesthesia and taken to PACU in critical condition. She is to remain intubated, to the ICU. She is on multiple pressors. All surgical counts were correct x2 at the end of this case. There were no immediate complications identified during this case. Dr. Harsh Martinez was present and scrubbed throughout the entire procedure. Poonam SCHNEIDER, was instrumental in completion of this operation as she assisted with opening and closing of all incisions, use of the camera, and the performance of the talc pleurodesis. Niharika Goldberg MD 
 
 
[id number] 
D:  10/30/2020 19:28 
T:  10/31/2020 6:40 
JOB #:  [id number]

## 2020-10-31 NOTE — CONSULTS
[facility] 
 
ICU TEAM Consult Note Name: John Villanueva : 1945 MRN: [id number] Date: 10/30/2020 Assessment:  
 
ICU Problems: 1. Acute respiratory failure with hypoxia s/p intubation post op 2. Large left pleural effusion s/p VATS with CT placement with Thoracic Surgery. ?Malignancy, pleural sample sent for cytology. 3. Symptomatic anemia s/p 2 PRBCs 4. SIRS (systemic inflammatory response syndrome) 5. Leukocytosis 6. Thrombocytopenia 7. Edema of both lower extremities 8. Massive splenomegaly with retroperitoneal adenopathy on CT abd/pelvis findings concerning for possible lymphoproliferative disorder ICU Comprehensive Plan of Care:  
 
Plans for this Shift: 1. Transfer to ICU 2. ABG now in PACU 3. Send post op labs including LA, CBC, CMP 4. Consult heme onc 5. F/U pending pleural fluid cytology and lung biopsy 6. Strict I&Os continue benítez 7. SBP Goal of: > 90 mmHg 8. MAP Goal of: > 65 mmHg 9. Phenylephrine - For above SBP/MAP goals 10. IVFs: LR@ 12 5ml/hr 11. Transfusion Trigger (Hgb): <7 g/dL 12. Respiratory Goals: 
a. Chlorhexidine  
b. Optimize PEEP/Ventilation/Oxygenation 
c. Goal Tidal Volume 6 cc/kg based on IBW 
d. Aim for lung protective ventilation 
e. Head of bed > 30 degrees 
f. Plan to Extubate: Plan for SBT tomorrow am 
13. Pulmonary toilet: Albuterol  PRN 14. SpO2 Goal: > 92% per vent, wean FiO2 as tolerated 15. Keep K>4; Mg>2 16. PT/OT: Will consult post extubation as needed 17. Discussed Plan of Care/Code Status: Full Code, Palliative care following, planning to meet with son on Monday. 25. Appreciate Consultants Input Thoracic Surgery 19. Discussed Care Plan with Bedside RN 
20. Documentation of Current Medications 21. Rest of Plan Below: 
 
F - Feeding:  No  
A - Analgesia: Fentanyl S - Sedation: Propofol T - DVT Prophylaxis: SCD's or Sequential Compression Device H - Head of Bed: > 30 Degrees U - Ulcer Prophylaxis: Pepcid (famotidine) G - Glycemic Control: Insulin S - Spontaneous Breathing Trial: Pending B - Bowel Regimen: MiraLax I - Indwelling Catheter: 
 Tubes: ETT, Chest Tube left and Orogastric Tube Lines: Peripheral IV and Arterial Line Drains: Hodges Catheter D - De-escalation of Antibiotics: Zosyn Subjective:  
Progress Note: 10/30/2020 Patient is asked to be seen by Dr. Kierra Ramsay for, post VATS with respiratory failure care necessitating the need for possible ICU care. Reason for ICU Admission: Post VATS  
HPI: 
Cb Dacosta is a 70 y. o. female with past medical history of left breast cancer, s/p radiation, and childhood asthma presented to the ED from home on 10/30/20 with chief complaint of SOB, leg swelling, and weight loss. Symptoms reportedly have been gradual in onset, worsening, now severe, constant, with SOB, LOPEZ, orthopnea. Patient reported no known chronic medical conditions. There were no reports of recent sick contacts or exposure to anyone with COVID 19. Per ED MD report, patient has became increasingly dyspneic, with increased supplemental oxygen requirement. Patient had no known prior lung disease. CTA chest revealed a large left pleural effusion with left lung atelectasis. Labs revealed WBC = 27.2, neutrophils = 2%, and absolute neutrophil count = 0.5.  Hemoglobin = 6.4 (with no comparison lab available for review). ED offered PRBC transfusion but patient reportedly refused initally.  She was started on Levofloxacin 750 mg IV for antibiotic coverage. Was admitted under the hospitalist service. CTS was consulted and plan for VATS later in day. Pulmonary was also consulted as well as Palliative care who spoke with patient and son. Agreed for blood products. She was given 2 units PRBCs and then taken to OR for Left VATS. While in OR patient was intubated for respiratory instability and procedure.  Hemodynamically unstable requiring low dose geovanna and arterial line was placed. Left VATS with pleural biopsis was completed and patient was taken to PACU intubated. Critical care was consulted for post op management. Patient in PACU withdraws on all ext. Left chest tube with sanguinous drainage. Patient on 60 mcg/min of Stuart synephrine via PIV, may need CVL placed if increased pressor requirements. Patient to remain intubated overnight. Awaiting bed in ICU. POD: 
Day of Surgery S/P:  
Procedure(s): LEFT VATS PLEURAL BIOPSIES POSSIBLE TALC PLEURODESIS/ Active Problem List:  
 
Problem List  Date Reviewed: 10/30/2020 Codes Class Leukocytosis ICD-10-CM: X10.264 ICD-9-CM: 288.60 [facility] ICD-10-CM: D70.9 ICD-9-CM: 288.00 Anemia ICD-10-CM: D64.9 ICD-9-CM: 285.9 Tachycardia ICD-10-CM: R00.0 ICD-9-CM: 579. 0 Thrombocytopenia ([facility] ICD-10-CM: D69.6 ICD-9-CM: 287.5 SIRS (systemic inflammatory response syndrome) (HCC) ICD-10-CM: R65.10 ICD-9-CM: 995.90 Pleural effusion, left ICD-10-CM: J90 ICD-9-CM: 511.9 * (Principal) Acute respiratory failure with hypoxia (HCC) ICD-10-CM: J96.01 
ICD-9-CM: 518.81 Past Medical History:  
 
 has no past medical history on file. Past Surgical History:  
 
 has no past surgical history on file. Home Medications:  
 
Prior to Admission medications Not on File Allergies/Social/Family History: No Known Allergies Social History Tobacco Use  Smoking status: Not on file Substance Use Topics  Alcohol use: Not on file History reviewed. No pertinent family history. Review of Systems:  
 
Review of systems not obtained due to patient factors. Objective:  
Vital Signs: 
Visit Vitals /67 Pulse 88 Temp 97.2 °F (36.2 °C) Resp 16 Ht 5' (1.524 m) Wt 54.4 kg (120 lb) SpO2 100% BMI 23.44 kg/m²  O2 Flow Rate (L/min): 15 l/min O2 Device: Ventilator Temp (24hrs), Av °F (36.7 °C), Min:97.2 °F (36.2 °C), Max:98.7 °F (37.1 °C) Intake/Output:  
 
Intake/Output Summary (Last 24 hours) at 10/30/2020 2019 Last data filed at 10/30/2020 2000 Gross per 24 hour Intake 3351.25 ml Output 2315 ml Net 1036.25 ml Physical Exam: 
 
General:  appears stated age, intubated and sedated Eye:  conjunctivae/corneas clear. Pupils round and reactive Neurologic:  Limited, sedated. Withdraws to pain Lymphatic:  Cervical, supraclavicular, and axillary nodes normal.  
Neck:  normal and no erythema or exudates noted. Lungs:  diminished on left, breaths per vent Heart:  regular rate and rhythm, S1, S2 normal, no murmur, click, rub or gallop Abdomen:  soft, mild distension. Hypoactive bowel sounds normal. No masses, no organomegaly Cardiovascular:  Regular rate and rhythm, S1S2 present, without murmur or extra heart sounds, pedal pulses normal and no edema Skin:  Normal. and no rash or abnormalities LABS AND  DATA: Personally reviewed Recent Labs 10/30/20 
1207 10/30/20 
0031 WBC 23.0* 27.2* HGB 6.7* 6.4* HCT 24.5* 23.6*  
PLT 58* 68* Recent Labs 10/30/20 
0031   
K 3.8  CO2 30 BUN 19  
CREA 0.72 * CA 8.5 Recent Labs 10/30/20 
0031 * TP 7.0 ALB 2.7*  
GLOB 4.3* No results for input(s): INR, PTP, APTT, INREXT in the last 72 hours. No results for input(s): PHI, PCO2I, PO2I, FIO2I in the last 72 hours. Recent Labs 10/30/20 
0031 TROIQ <0.05 Hemodynamics:  
PAP:   CO:    
Wedge:   CI:    
CVP:    SVR: BP 2: 603/90(5758) (10/30/20 1700) PVR:    
 
Ventilator Settings: 
Mode Rate Tidal Volume Pressure FiO2 PEEP  
         100 % Peak airway pressure:     
Minute ventilation: 11.6 l/min MEDS: Reviewed Chest X-Ray: CXR Results  (Last 48 hours) 10/30/20 0107  XR CHEST PORT Final result  Impression:  IMPRESSION:  
 Extensive left lung parenchymal opacity with parapneumonic effusion. Mediastinal adenopathy is suspected. Cardiomegaly. .   
   
   
  
 Narrative:  Clinical history: short of breath INDICATION:   short of breath COMPARISON: None FINDINGS:  
AP portable upright view of the chest demonstrates a large cardiopericardial  
silhouette. Moderate to large left-sided pleural effusion. Extensive left lung  
parenchymal opacity with atelectasis and consolidation. .Patient is on a cardiac  
monitor. CT Results  (Last 48 hours) 10/30/20 0340  CT ABD PELV WO CONT Final result Impression:  IMPRESSION:  
There is massive splenomegaly with extensive retroperitoneal adenopathy. Findings are consistent with lymphoproliferative disorder. Narrative:  CLINICAL HISTORY: Possible neoplasm. INDICATION: ? cancer COMPARISON:  
CONTRAST:  None. TECHNIQUE:   
Thin axial images were obtained through the abdomen and pelvis. Coronal and  
sagittal reformats were generated. Oral contrast was not administered. CT dose  
reduction was achieved through use of a standardized protocol tailored for this  
examination and automatic exposure control for dose modulation. The absence of intravenous contrast material reduces the sensitivity for  
evaluation of visceral organs and vasculature including presence of small mass  
lesions, hemodynamically significant stenoses, dissections, mucosal  
abnormalities etc.  
   
FINDINGS:   
LOWER THORAX: Mediastinal and hilar lymphadenopathy. Large left-sided pleural  
effusion with atelectatic change of the left lung. LIVER/GALLBLADDER: No mass. Gallbladder is within normal limits. CBD is not  
dilated. SPLEEN/PANCREAS:  There is massive splenomegaly. Massive retroperitoneal  
lymphadenopathy. ADRENALS/KIDNEYS: Left kidney is displaced anteriorly and to the left. No  
calculus or hydronephrosis. STOMACH: Effaced. SMALL BOWEL/COLON: Limited evaluation. Extensive fecal stasis. APPENDIX: Not visualized. PERITONEUM: There is minimal ascites present. RETROPERITONEUM: Extensive retroperitoneal adenopathy encases the abdominal  
aorta. REPRODUCTIVE ORGANS: Limited evaluation. URINARY BLADDER: No mass or calculus. BONES: No destructive bone lesion. ADDITIONAL COMMENTS: N/A  
   
  
 10/30/20 0222  CTA CHEST W OR W WO CONT Final result Impression:  IMPRESSION:   
There is no pulmonary embolism. There is no aortic aneurysm or dissection. Large left-sided pleural effusion with left lung atelectasis. Left to right midline shift. There is mediastinal and hilar adenopathy which is  
extensive. Neoplastic etiology should be considered. Narrative:  Clinical history: SOB  
INDICATION:   SOB  
COMPARISON: None TECHNIQUE: CT of the chest with  IV contrast , Isovue-370 is performed. Axial  
images from the thoracic inlet to the level of the upper abdomen are obtained. Manual post-processing of the images and coronal reformatting is also performed. CT dose reduction was achieved through use of a standardized protocol tailored  
for this examination and automatic exposure control for dose modulation. Multiplanar reformatted imaging was performed. Sagittal and coronal reformatting. 3-D Postprocessing of imaging was performed. 3-D MIP reconstructed images were obtained in the coronal plane. FINDINGS:   
There is no pulmonary embolism. There is no aortic aneurysm or dissection. There is a large left-sided pleural effusion. Atelectasis/consolidation of the  
left lung. Mediastinal and hilar adenopathy. Right hilar adenopathy. The aorta  
is normal in course and caliber. The proximal pulmonary arteries are without  
evidence of filling defects. No lytic or blastic lesions are identified. The  
remainder of the upper abdomen visualized is unremarkable. Mild dextroscoliosis. Small nodular density on the right adjacent to the fissures most likely  
atelectatic in nature. ECHO: 10/30/20 Interpretation Summary Result status: Final result · LV: Estimated LVEF is 45 - 50%. Normal cavity size and wall thickness. Inconclusive left ventricular diastolic function. · LA: Moderately dilated left atrium. · MV: Mild mitral valve regurgitation is present. · TV: Moderate tricuspid valve regurgitation is present. · PA: Moderate pulmonary hypertension. · Pericardium: There is a large left pleural effusion. Multidisciplinary Rounds Completed:  Pending ABCDEF Bundle/Checklist Completed: 
Yes SPECIAL EQUIPMENT None DISPOSITION Stay in ICU CRITICAL CARE CONSULTANT NOTE I had a face to face encounter with the patient, reviewed and interpreted patient data including clinical events, labs, images, vital signs, I/O's, and examined patient. I have discussed the case and the plan and management of the patient's care with the consulting services, the bedside nurses and the respiratory therapist.   
 
NOTE OF PERSONAL INVOLVEMENT IN CARE This patient has a high probability of imminent, clinically significant deterioration, which requires the highest level of preparedness to intervene urgently. I participated in the decision-making and personally managed or directed the management of the following life and organ supporting interventions that required my frequent assessment to treat or prevent imminent deterioration. I personally spent 40 minutes of critical care time. This is time spent at this critically ill patient's bedside actively involved in patient care as well as the coordination of care and discussions with the patient's family. This does not include any procedural time which has been billed separately. Jameel Omer [facility] Critical Care Medicine [facility]

## 2020-11-01 NOTE — PROCEDURES
Patient extubated to 4L nasal cannula at 1120. She is in no acute distress and doing well at this time. Parker Miranda [facility] Critical Care Medicine [facility]

## 2020-11-01 NOTE — PROGRESS NOTES
[facility] 
 
ICU TEAM Progress Note Name: Jessica Goff : 1945 MRN: [id number] Date: 2020 Assessment:  
 
ICU Problems: 1. Acute respiratory failure with hypoxia s/p intubation post op 2. Large left pleural effusion s/p VATS with CT placement with Thoracic Surgery. ?Malignancy, pleural sample sent for cytology. 3. Symptomatic anemia s/p 2 PRBCs 4. SIRS (systemic inflammatory response syndrome) 5. Leukocytosis 6. Thrombocytopenia 7. Edema of both lower extremities 8. Massive splenomegaly with retroperitoneal adenopathy on CT abd/pelvis findings concerning for possible lymphoproliferative disorder ICU Comprehensive Plan of Care:  
 
Plans for this Shift:  
 
1. Stay in  ICU 2. Daily CBC, BMP 3. Consult heme onc 4. F/U pending pleural fluid cytology and lung biopsy 5. Thoracic surgery following, appreciate input 6. Strict I&Os continue benítez 7. SBP Goal of: > 90 mmHg 8. MAP Goal of: > 65 mmHg 9. Phenylephrine - For above SBP/MAP goals 10. IVFs: LR@ 12 5ml/hr 11. Transfusion Trigger (Hgb): <7 g/dL 12. Respiratory Goals: 
a. Chlorhexidine  
b. Optimize PEEP/Ventilation/Oxygenation 
c. Goal Tidal Volume 6 cc/kg based on IBW 
d. Aim for lung protective ventilation 
e. Head of bed > 30 degrees 
f. Plan to Extubate: Plan for SBT tomorrow am 
13. Pulmonary toilet: Albuterol  PRN 14. SpO2 Goal: > 92% per vent, wean FiO2 as tolerated 15. Keep K>4; Mg>2 16. PT/OT: Will consult post extubation as needed 17. Discussed Plan of Care/Code Status: Full Code, Palliative care following, planning to meet with son on Monday. 25. Appreciate Consultants Input 19. Discussed Care Plan with Bedside RN 
20. Documentation of Current Medications 21. Rest of Plan Below: 
 
F - Feeding:  No  
A - Analgesia: Fentanyl S - Sedation: Propofol T - DVT Prophylaxis: SCD's or Sequential Compression Device H - Head of Bed: > 30 Degrees U - Ulcer Prophylaxis: Pepcid (famotidine) G - Glycemic Control: Insulin S - Spontaneous Breathing Trial: Pending B - Bowel Regimen: MiraLax I - Indwelling Catheter: 
 Tubes: ETT, Chest Tube left and Orogastric Tube Lines: Peripheral IV and Arterial Line Drains: Hodges Catheter D - De-escalation of Antibiotics: Zosyn Subjective:  
Progress Note: 11/1/2020 Patient is asked to be seen by Dr. Ofelia Razo for, post VATS with respiratory failure care necessitating the need for possible ICU care. Reason for ICU Admission: Post VATS  
HPI: 
Edgar Dacosta is a 70 y. o. female with past medical history of left breast cancer, s/p radiation, and childhood asthma presented to the ED from home on 10/30/20 with chief complaint of SOB, leg swelling, and weight loss. Symptoms reportedly have been gradual in onset, worsening, now severe, constant, with SOB, LOPEZ, orthopnea. Patient reported no known chronic medical conditions. There were no reports of recent sick contacts or exposure to anyone with COVID 19. Per ED MD report, patient has became increasingly dyspneic, with increased supplemental oxygen requirement. Patient had no known prior lung disease. CTA chest revealed a large left pleural effusion with left lung atelectasis. Labs revealed WBC = 27.2, neutrophils = 2%, and absolute neutrophil count = 0.5.  Hemoglobin = 6.4 (with no comparison lab available for review). ED offered PRBC transfusion but patient reportedly refused initally.  She was started on Levofloxacin 750 mg IV for antibiotic coverage. Was admitted under the hospitalist service. CTS was consulted and plan for VATS later in day. Pulmonary was also consulted as well as Palliative care who spoke with patient and son. Agreed for blood products. She was given 2 units PRBCs and then taken to OR for Left VATS. While in OR patient was intubated for respiratory instability and procedure.  Hemodynamically unstable requiring low dose geovanna and arterial line was placed. Left VATS with pleural biopsis was completed and patient was taken to PACU intubated. Critical care was consulted for post op management. Patient in PACU withdraws on all ext. Left chest tube with sanguinous drainage. Patient on 60 mcg/min of Stuart synephrine via PIV, may need CVL placed if increased pressor requirements. Patient to remain intubated overnight. Awaiting bed in ICU. POD: 
Day of Surgery S/P:  
Procedure(s): LEFT VATS PLEURAL BIOPSIES POSSIBLE TALC PLEURODESIS/ Active Problem List:  
 
Problem List  Date Reviewed: 10/30/2020 Codes Class Leukocytosis ICD-10-CM: A52.541 ICD-9-CM: 288.60 Neutropenia ([facility] ICD-10-CM: D70.9 ICD-9-CM: 288.00 Anemia ICD-10-CM: D64.9 ICD-9-CM: 285.9 Tachycardia ICD-10-CM: R00.0 ICD-9-CM: 881. 0 Thrombocytopenia ([facility] ICD-10-CM: D69.6 ICD-9-CM: 287.5 SIRS (systemic inflammatory response syndrome) (HCC) ICD-10-CM: R65.10 ICD-9-CM: 995.90 Pleural effusion, left ICD-10-CM: J90 ICD-9-CM: 511.9 * (Principal) Acute respiratory failure with hypoxia (HCC) ICD-10-CM: J96.01 
ICD-9-CM: 518.81 Past Medical History:  
 
 has no past medical history on file. Past Surgical History:  
 
 has no past surgical history on file. Home Medications:  
 
Prior to Admission medications Not on File Allergies/Social/Family History: No Known Allergies Social History Tobacco Use  Smoking status: Not on file Substance Use Topics  Alcohol use: Not on file History reviewed. No pertinent family history. Review of Systems:  
 
Review of systems not obtained due to patient factors. Objective:  
Vital Signs: 
Visit Vitals BP (!) 118/59 Pulse 90 Temp 97.8 °F (36.6 °C) Resp 16 Ht 5' (1.524 m) Wt 59.1 kg (130 lb 4.7 oz) SpO2 100% BMI 25.45 kg/m²  O2 Flow Rate (L/min): 15 l/min O2 Device: Endotracheal tube, Ventilator Temp (24hrs), Av.5 °F (36.9 °C), Min:97.8 °F (36.6 °C), Max:99.8 °F (37.7 °C) Intake/Output:  
 
Intake/Output Summary (Last 24 hours) at 2020 5091 Last data filed at 2020 0700 Gross per 24 hour Intake 5403.05 ml Output 1570 ml Net 3833.05 ml Physical Exam: 
 
General:  appears stated age, intubated and sedated Eye:  conjunctivae/corneas clear. Pupils round and reactive Neurologic:  Limited, sedated. Withdraws to pain Lymphatic:  Cervical, supraclavicular, and axillary nodes normal.  
Neck:  normal and no erythema or exudates noted. Lungs: Clear to auscultation Heart:  regular rate and rhythm, S1, S2 normal, no murmur, click, rub or gallop Abdomen:  soft, mild distension. Hypoactive bowel sounds normal. No masses, no organomegaly Cardiovascular:  Regular rate and rhythm, S1S2 present, without murmur or extra heart sounds, pedal pulses normal and no edema Skin:  Normal. and no rash or abnormalities LABS AND  DATA: Personally reviewed Recent Labs 20 
7609 10/31/20 
1654 WBC 34.1* 45.2* HGB 8.7* 8.9* HCT 30.3* 31.7* PLT 55* 69* Recent Labs 20 
0415 10/31/20 
0555 10/30/20 
2006  146* 144  
K 3.8 4.1 3.9 * 113* 111* CO2 25 26 27 BUN 22* 24* 22* CREA 0.59 0.66 0.62 GLU 83 65 86  
CA 7.5* 7.4* 7.3*  
MG  --  2.0 2.0 PHOS  --  3.9 3.7 Recent Labs 10/31/20 
0555 10/30/20 
2006 AP 87 105  
TP 5.2* 5.8* ALB 2.3* 2.2*  
GLOB 2.9 3.6 Recent Labs 10/31/20 
1847 10/30/20 
2006 INR  --  1.4* PTP  --  14.1* APTT 34.4*  -- No results for input(s): PHI, PCO2I, PO2I, FIO2I in the last 72 hours. Recent Labs 10/30/20 
0031 TROIQ <0.05 Hemodynamics:  
PAP:   CO:    
Wedge:   CI:    
CVP:    SVR: BP 2: 643/53(1691) (10/30/20 1700) PVR:    
 
Ventilator Settings: 
Mode Rate Tidal Volume Pressure FiO2 PEEP Assist control, Volume control   350 ml    60 % 4 cm H20  
 
 Peak airway pressure: 37 cm H2O Minute ventilation: 6.04 l/min MEDS: Reviewed Chest X-Ray: CXR Results  (Last 48 hours) 10/31/20 0017  XR CHEST PORT Final result Impression:  IMPRESSION:  
   
Left-sided central venous access catheter without pneumothorax. Catheter in  
appropriate position for use. Extensive left lung parenchymal opacity with improved left-sided effusion. Interval left pleural drainage catheter placement. Mediastinal and hilar adenopathy with left hilar mass lesion/pneumonia. Cardiomegaly. .   
   
   
   
  
 Narrative:  Clinical history: central line placement INDICATION:   central line placement COMPARISON: 10/30/2020 FINDINGS:  
FINDINGS:  
AP portable upright view of the chest demonstrates a large cardiopericardial  
silhouette. Moderate to large left-sided pleural effusion. Extensive left lung  
parenchymal opacity with atelectasis and consolidation. Right parenchymal and  
interstitial opacity. ET tube in appropriate position. NG tube in appropriate  
position. Central venous access catheter tip in appropriate position for use. No  
pneumothorax. . Mediastinal and hilar adenopathy. Left lung atelectasis/hilar  
mass lesion. Patient is on a cardiac monitor. CT Results  (Last 48 hours) None ECHO: 10/30/20 Interpretation Summary Result status: Final result · LV: Estimated LVEF is 45 - 50%. Normal cavity size and wall thickness. Inconclusive left ventricular diastolic function. · LA: Moderately dilated left atrium. · MV: Mild mitral valve regurgitation is present. · TV: Moderate tricuspid valve regurgitation is present. · PA: Moderate pulmonary hypertension. · Pericardium: There is a large left pleural effusion. Multidisciplinary Rounds Completed:  Pending ABCDEF Bundle/Checklist Completed: 
Yes SPECIAL EQUIPMENT None DISPOSITION Stay in ICU CRITICAL CARE CONSULTANT NOTE I had a face to face encounter with the patient, reviewed and interpreted patient data including clinical events, labs, images, vital signs, I/O's, and examined patient. I have discussed the case and the plan and management of the patient's care with the consulting services, the bedside nurses and the respiratory therapist.   
 
NOTE OF PERSONAL INVOLVEMENT IN CARE This patient has a high probability of imminent, clinically significant deterioration, which requires the highest level of preparedness to intervene urgently. I participated in the decision-making and personally managed or directed the management of the following life and organ supporting interventions that required my frequent assessment to treat or prevent imminent deterioration. I personally spent 75 minutes of critical care time. This is time spent at this critically ill patient's bedside actively involved in patient care as well as the coordination of care and discussions with the patient's family. This does not include any procedural time which has been billed separately. Georgina Lopez [facility] Critical Care Medicine [facility]

## 2020-11-01 NOTE — PROGRESS NOTES
Problem: Breathing Pattern - Ineffective Goal: *Absence of hypoxia Outcome: Progressing Towards Goal 
  
Problem: Falls - Risk of 
Goal: *Absence of Falls Description: Document Farzaneh Baumann Fall Risk and appropriate interventions in the flowsheet. Outcome: Progressing Towards Goal 
Note: Fall Risk Interventions: 
Mobility Interventions: Communicate number of staff needed for ambulation/transfer, OT consult for ADLs, PT Consult for mobility concerns, Strengthening exercises (ROM-active/passive) Mentation Interventions: Adequate sleep, hydration, pain control, Door open when patient unattended, Evaluate medications/consider consulting pharmacy, Familiar objects from home, Increase mobility, More frequent rounding, Reorient patient, Room close to nurse's station, Toileting rounds, Update white board Medication Interventions: Evaluate medications/consider consulting pharmacy Elimination Interventions: Stay With Me (per policy), Toilet paper/wipes in reach, Toileting schedule/hourly rounds Problem: Pressure Injury - Risk of 
Goal: *Prevention of pressure injury Description: Document Mauro Scale and appropriate interventions in the flowsheet. Outcome: Progressing Towards Goal 
Note: Pressure Injury Interventions: 
Sensory Interventions: Assess changes in LOC, Assess need for specialty bed, Avoid rigorous massage over bony prominences, Check visual cues for pain, Discuss PT/OT consult with provider, Float heels, Keep linens dry and wrinkle-free, Maintain/enhance activity level, Minimize linen layers, Monitor skin under medical devices, Pad between skin to skin, Pressure redistribution bed/mattress (bed type), Sit a 90-degree angle/use footstool if needed, Turn and reposition approx. every two hours (pillows and wedges if needed), Use 30-degree side-lying position Moisture Interventions: Absorbent underpads, Apply protective barrier, creams and emollients, Assess need for specialty bed, Check for incontinence Q2 hours and as needed, Contain wound drainage, Internal/External fecal devices, Internal/External urinary devices, Limit adult briefs, Maintain skin hydration (lotion/cream), Minimize layers, Moisture barrier Activity Interventions: Assess need for specialty bed, Pressure redistribution bed/mattress(bed type), PT/OT evaluation Mobility Interventions: Assess need for specialty bed, Float heels, HOB 30 degrees or less, Pressure redistribution bed/mattress (bed type), PT/OT evaluation, Turn and reposition approx. every two hours(pillow and wedges) Nutrition Interventions: Document food/fluid/supplement intake, Discuss nutritional consult with provider, Offer support with meals,snacks and hydration Friction and Shear Interventions: Apply protective barrier, creams and emollients, Feet elevated on foot rest, Foam dressings/transparent film/skin sealants, HOB 30 degrees or less, Lift sheet, Lift team/patient mobility team, Minimize layers, Sit at 90-degree angle Problem: Non-Violent Restraints Goal: *Removal from restraints as soon as assessed to be safe Outcome: Progressing Towards Goal 
Goal: *No harm/injury to patient while restraints in use Outcome: Progressing Towards Goal 
Goal: *Patient's dignity will be maintained Outcome: Progressing Towards Goal 
Goal: *Patient Specific Goal (EDIT GOAL, INSERT TEXT) Outcome: Progressing Towards Goal 
Goal: Non-violent Restaints:Standard Interventions Outcome: Progressing Towards Goal 
Goal: Non-violent Restraints:Patient Interventions Outcome: Progressing Towards Goal 
  
Problem: Ventilator Management Goal: *Adequate oxygenation and ventilation Outcome: Progressing Towards Goal 
Goal: *Patient maintains clear airway/free of aspiration Outcome: Progressing Towards Goal 
Goal: *Absence of infection signs and symptoms Outcome: Progressing Towards Goal 
Goal: *Normal spontaneous ventilation Outcome: Progressing Towards Goal

## 2020-11-01 NOTE — PROGRESS NOTES
Mrs. Michelle Childs is POD#2 after a left VATS, pleural biopsies, talc pleurodesis and drainage of a >3L effusion. She remains intubated and on pressors. 
  
Afebrile  on [place] gtt 
  
CT no air leak, 740ml serous 
  
On exam she is sedate Intubated Head and neck veins less distended Moves all four extremities when sedation weaned Lungs CTA b/l Dressing c/d/i 
rrr 
  
  
CXR- much better expansion on left side, effusion resolved, mediastinal lymphadenopathy 
  
WBC 34.1 Hgb 8.7 Plts 55 
  
Diagnoses  1- Acute hypoxic respiratory failure  2: Malignant pleural effusion  3: mediastinal lymphadenopathy  4: Multisystem organ dysfunction 
  
Mrs. Michelle Childs remains in critical condition. Path pending but suspected Stage IV cancer. Leave Ct to suction. If output remains high may need to eventually consider a Pleur-X. Appreciate ICU team care. Greatly appreciate Oncology evaluation, possibility of lymphoma raised. Will defer workup to Oncology.

## 2020-11-01 NOTE — PROGRESS NOTES
Hematology Oncology Progress Note Follow up for: LAD, lymphocytosis Chart notes reviewed since last visit. Case discussed with following: . Patient complains of the following: Patient intubated Additional concerns noted by the staff:  
 
Patient Vitals for the past 24 hrs: 
 BP Temp Pulse Resp SpO2 Weight 11/01/20 0800  (P) 96.8 °F (36 °C)      
11/01/20 0700 (!) 118/59  90 16 100 %   
11/01/20 0600 117/60  93 16    
11/01/20 0500 (!) 92/48  87 16 100 %   
11/01/20 0429   85 16 100 %   
11/01/20 0422      59.1 kg (130 lb 4.7 oz) 11/01/20 0400 98/60 97.8 °F (36.6 °C) 93 17    
11/01/20 0300 (!) 108/56  88 16 100 %   
11/01/20 0200 108/61  88 16 100 %   
11/01/20 0100   96 16 100 %   
11/01/20 0016   88 16 100 %   
11/01/20 0000 (!) 119/59 97.8 °F (36.6 °C) 87 16 100 %   
10/31/20 2300 (!) 119/56  92 20 100 %   
10/31/20 2200 (!) 105/49  87 16 100 %   
10/31/20 2145   88 16 100 %   
10/31/20 2130   89 16 100 %   
10/31/20 2115   92 17 100 %   
10/31/20 2100 (!) 112/50  89 16 100 %   
10/31/20 2045   89 16 100 %   
10/31/20 2030   90 16 100 %   
10/31/20 2015   90 16 100 %   
10/31/20 2000 (!) 102/46 97.8 °F (36.6 °C) 98 17 100 %   
10/31/20 1945   96 15 100 %   
10/31/20 1940   90 16 100 %   
10/31/20 1930 (!) 117/58  88 16 100 %   
10/31/20 1915 (!) 119/59  91 24 100 %   
10/31/20 1900 (!) 119/58  90 26 100 %   
10/31/20 1800 (!) 117/55  92 17 100 %   
10/31/20 1700 (!) 118/48  87 20 100 %   
10/31/20 1600 (!) 113/45 98.5 °F (36.9 °C) 85 16 100 %   
10/31/20 1546   85 16 100 %   
10/31/20 1500 (!) 93/46  86 16 100 %   
10/31/20 1400 (!) 106/52  88 16 100 %   
10/31/20 1300 (!) 111/51  91 17 100 %   
10/31/20 1243   95 (!) 33 96 %   
10/31/20 1200 (!) 90/51 99.1 °F (37.3 °C) 91 18 100 %   
10/31/20 1100 (!) 100/51  89 17 100 %   
10/31/20 1000 (!) 100/52  89 18 100 %   
 10/31/20 0900 (!) 101/53  92 17 100 %  Review of Systems: 
Unable to be obtained due to patient being intubated Physical Examination: 
Gen Intubated Abd benign Labs: 
Recent Results (from the past 24 hour(s)) LD Collection Time: 10/31/20  6:47 PM  
Result Value Ref Range  (H) 81 - 246 U/L  
RETICULOCYTE COUNT Collection Time: 10/31/20  6:47 PM  
Result Value Ref Range Reticulocyte count 4.6 (H) 0.7 - 2.1 % Absolute Retic Cnt. 0.1720 (H) 0.0164 - 0.0776 M/ul VITAMIN B12 Collection Time: 10/31/20  6:47 PM  
Result Value Ref Range Vitamin B12 967 193 - 986 pg/mL FOLATE Collection Time: 10/31/20  6:47 PM  
Result Value Ref Range Folate 15.5 5.0 - 21.0 ng/mL IRON PROFILE Collection Time: 10/31/20  6:47 PM  
Result Value Ref Range Iron 15 (L) 35 - 150 ug/dL TIBC 218 (L) 250 - 450 ug/dL Iron % saturation 7 (L) 20 - 50 % FERRITIN Collection Time: 10/31/20  6:47 PM  
Result Value Ref Range Ferritin 125 8 - 252 NG/ML  
PTT Collection Time: 10/31/20  6:47 PM  
Result Value Ref Range aPTT 34.4 (H) 22.1 - 32.0 sec  
 aPTT, therapeutic range     58.0 - 77.0 SECS FIBRINOGEN Collection Time: 10/31/20  6:47 PM  
Result Value Ref Range Fibrinogen 220 200 - 475 mg/dL GLUCOSE, POC Collection Time: 10/31/20  6:54 PM  
Result Value Ref Range Glucose (POC) 102 (H) 65 - 100 mg/dL Performed by [address] [place] [address] Rd, BASIC Collection Time: 11/01/20  4:15 AM  
Result Value Ref Range Sodium 145 136 - 145 mmol/L Potassium 3.8 3.5 - 5.1 mmol/L Chloride 114 (H) 97 - 108 mmol/L  
 CO2 25 21 - 32 mmol/L Anion gap 6 5 - 15 mmol/L Glucose 83 65 - 100 mg/dL BUN 22 (H) 6 - 20 MG/DL Creatinine 0.59 0.55 - 1.02 MG/DL  
 BUN/Creatinine ratio 37 (H) 12 - 20 GFR est AA >60 >60 ml/min/1.73m2 GFR est non-AA >60 >60 ml/min/1.73m2  Calcium 7.5 (L) 8.5 - 10.1 MG/DL  
CBC WITH AUTOMATED DIFF  
 Collection Time: 11/01/20  4:15 AM  
Result Value Ref Range WBC 34.1 (H) 3.6 - 11.0 K/uL  
 RBC 3.47 (L) 3.80 - 5.20 M/uL HGB 8.7 (L) 11.5 - 16.0 g/dL HCT 30.3 (L) 35.0 - 47.0 % MCV 87.3 80.0 - 99.0 FL  
 MCH 25.1 (L) 26.0 - 34.0 PG  
 MCHC 28.7 (L) 30.0 - 36.5 g/dL  
 RDW 19.9 (H) 11.5 - 14.5 % PLATELET 55 (L) 701 - 400 K/uL MPV 10.0 8.9 - 12.9 FL  
 NRBC 0.6 (H) 0  WBC ABSOLUTE NRBC 0.22 (H) 0.00 - 0.01 K/uL NEUTROPHILS 19 (L) 32 - 75 % LYMPHOCYTES 80 (H) 12 - 49 % MONOCYTES 1 (L) 5 - 13 % EOSINOPHILS 0 0 - 7 % BASOPHILS 0 0 - 1 % IMMATURE GRANULOCYTES 0 %  
 ABS. NEUTROPHILS 6.5 1.8 - 8.0 K/UL  
 ABS. LYMPHOCYTES 27.3 (H) 0.8 - 3.5 K/UL  
 ABS. MONOCYTES 0.3 0.0 - 1.0 K/UL  
 ABS. EOSINOPHILS 0.0 0.0 - 0.4 K/UL  
 ABS. BASOPHILS 0.0 0.0 - 0.1 K/UL  
 ABS. IMM. GRANS. 0.0 K/UL  
 DF MANUAL    
 RBC COMMENTS ANISOCYTOSIS 1+ 
    
 RBC COMMENTS POLYCHROMASIA 1+ 
    
 RBC COMMENTS HYPOCHROMIA 1+ WBC COMMENTS ATYPICAL LYMPHOCYTES PRESENT Assessment and Plan: 
LAD, lymphocytosis, Thrombocytopenia 
-await path from VATS done on Friday 
-Peripheral blood flow pending, very suspicious for some type of lymphoma/leukemia 
-PLTs down slightly today, no bleeding, no need for PLTs unless < 10 or bleeding 
-Cont broad spectrum abx per primary team

## 2020-11-01 NOTE — PROGRESS NOTES
1120: Pt extubated to 4L NC with RT. Pt soft-spoken but able to state name. Pt denies any pain at this time but states \"I just feel terrible. \" Pt refuses offers to reposition.

## 2020-11-01 NOTE — PROGRESS NOTES
Bedside report received from [facility] using sbar format 
2000-L CT dressing saturated w/ SS drainage/dressing taken down site swabbed with CHG and dry dressing applied/secured with special tape. opitfoam gentle LQ border placed over coccyx/sacrum as a preventative treatment/lacrilube ordered to keep eyes moist 
2125-medicated for non verbal pain score of 3 
2210-non verbal pain score 1 post fent (brief /slight grimace with repositioning) 0200- ending of Daylight Saving Time at 2:00 a.m.  
 
0410-nods yes to pain/medicated per order 
0500-nods 'no\" to pain when asked 0717-dip stopped for SBT 
 
 
0730-bedside report given to RN using sbar format

## 2020-11-02 NOTE — PROGRESS NOTES
Palliative Medicine Consult Aakash: [phone] ([phone]) Patient Name: Cash Chadwick YOB: 1945 Date of Initial Consult: October 30, 2020 Reason for Consult: Care Decisions Requesting Provider: Dr. Aissatou Ybarra Primary Care Physician: Ronel, MD Nidia 
 
 SUMMARY:  
Cash Chadwick is a 70 y.o. with a past history of left breast cancer (surgery, XRT), asthma, who was admitted on 10/30/2020 from home with a diagnosis of Large left pleural effusion (? Malignant) with plans for VATS today, anemia hgb 6.4, leukocytosis, neurtorpenia, tachycardia, thrombocytopenia, SIRS, acute respiratory failure with hypoxia, malnutrition, splenomegaly,lymphadenopathy/thrombocytopenia/neutropenia/Weight loss for which heme onc consulted. Family reports weeks of dyspnea, weight loss, fatigue, left rib pain Current medical issues leading to Palliative Medicine involvement include: support with care goals given acute illness, advance disease, poor prognosis. 10/30/20:  Left VATS pleural biopsy and possible talc pleurodesis. PORF; extubated 11/1/20 Social: son (only child) lives at home with the pt not because she needed held but to help him out. 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 Pt has 3 dogs and was indep with all ADL's until recently. [employer]. PALLIATIVE DIAGNOSES:  
1. Shortness of breath 2. Cachexia 3. Hypoxia 4. Physical debility 5. Goals of care PLAN:  
1. Pt seen with son Gianluca Guthrie at the bedside. Pt alert and nods head appropriately but does not speak 2. Inquired about patient's care prior to admission. Son reports that the pt is totally independent and cares for herself and 3 dogs. Shared that they did not pay close attention to the symptoms until she became very ill and asked to come to the hospital 
3.  We discussed the patient's acute issues and concerns including full code status, nutrition, mentation, profound debility. Since admission the pt is much more alert and eating on her own. Passed speech eval. 
4.  Inquired about patient's wishes and she agreed with son who wants all measures taken to prolong life. Son says if the pt can be stabilized on bipap then he would prefer that but he wants full code. I explained the risks and concerns with full code status and he says that it has been explained previously and he understands. 5. Disposition: goal is to bring the pt home with physical therapy but son wants to see what the needs are closer to dc before making any decisions 6. Son has my contact information if needed 7. Will follow peripherally 8. ACP: son has a clear understanding of the patient's wishes and he is the next of kin 9. Initial consult note routed to primary continuity provider and/or primary health care team members 10. Communicated plan of care with: Palliative [id number] Team 
 
 GOALS OF CARE / TREATMENT PREFERENCES:  
 
GOALS OF CARE: 
Patient/Health Care Proxy Stated Goals: (to be discussed) TREATMENT PREFERENCES:  
Code Status: Full Code Advance Care Planning: 
[x] [facility] Interdisciplinary Team has updated the ACP Navigator with Health Care Decision Maker and Patient Capacity 
l Primary Decision Maker (Active): Jake Leyden Son - [phone] No flowsheet data found. Medical Interventions: Full interventions Other Instructions: Other: As far as possible, the palliative care team has discussed with patient / health care proxy about goals of care / treatment preferences for patient. HISTORY:  
 
History obtained from: chart CHIEF COMPLAINT: pt admitted with aforementioned history and issues HPI/SUBJECTIVE: The patient is:  
[x] Verbal and participatory [] Non-participatory due to: But limited due to Medical condition Clinical Pain Assessment (nonverbal scale for severity on nonverbal patients):  
Clinical Pain Assessment Severity: 0 Activity (Movement): Lying quietly, normal position Duration: for how long has pt been experiencing pain (e.g., 2 days, 1 month, years) Frequency: how often pain is an issue (e.g., several times per day, once every few days, constant) FUNCTIONAL ASSESSMENT:  
 
Palliative Performance Scale (PPS): PPS: 20 
 
 
 PSYCHOSOCIAL/SPIRITUAL SCREENING:  
 
Palliative IDT has assessed this patient for cultural preferences / practices and a referral made as appropriate to needs (Cultural Services, Patient Advocacy, Ethics, etc.) Any spiritual / Yarsanism concerns: 
[] Yes /  [x] No 
 
Caregiver Burnout: 
[] Yes /  [x] No /  [] No Caregiver Present Anticipatory grief assessment:  
[x] Normal  / [] Maladaptive ESAS Anxiety: Anxiety: 0 
 
ESAS Depression:    
 
 
 REVIEW OF SYSTEMS:  
 
Positive and pertinent negative findings in ROS are noted above in HPI. The following systems were [x] reviewed  / [] unable to be reviewed as noted in HPI Other findings are noted below. Systems: constitutional, ears/nose/mouth/throat, respiratory, gastrointestinal, genitourinary, musculoskeletal, integumentary, neurologic, psychiatric, endocrine. Positive findings noted below. Modified ESAS Completed by: provider Fatigue: 10 Drowsiness: 9 Pain: 0 Anxiety: 0 Nausea: 0 Anorexia: 10 Dyspnea: 2 PHYSICAL EXAM:  
 
From RN flowsheet: 
Wt Readings from Last 3 Encounters:  
11/02/20 136 lb 0.4 oz (61.7 kg) Blood pressure 113/61, pulse (!) 101, temperature 97.7 °F (36.5 °C), resp. rate 23, height 5' (1.524 m), weight 136 lb 0.4 oz (61.7 kg), SpO2 92 %. Pain Scale 1: Numeric (0 - 10) Pain Intensity 1: 0 Pain Location 1: (CT site) Pain Intervention(s) 1: Medication (see MAR), Emotional support, Environmental changes, Repositioned Last bowel movement, if known:  
 
Constitutional: cachexia, non verbal, alert, nad Eyes: pupils equal, anicteric ENMT: no nasal discharge, dry mucous membranes Cardiovascular:  
Respiratory: breathing not labored, symmetric Gastrointestinal: Musculoskeletal: no deformity Skin: warm, dry Neurologic: nodding appropriately Psychiatric: calm Other: 
 
 
 HISTORY:  
 
Principal Problem: 
  Acute respiratory failure with hypoxia ([address] (10/30/2020) Active Problems: 
  Leukocytosis (10/30/2020) Neutropenia ([address] (10/30/2020) Anemia (10/30/2020) Tachycardia (10/30/2020) Thrombocytopenia ([address] (10/30/2020) SIRS (systemic inflammatory response syndrome) ([address] (10/30/2020) Pleural effusion, left (10/30/2020) History reviewed. No pertinent past medical history. History reviewed. No pertinent surgical history. History reviewed. No pertinent family history. History reviewed, no pertinent family history. Social History Tobacco Use  Smoking status: Not on file Substance Use Topics  Alcohol use: Not on file No Known Allergies Current Facility-Administered Medications Medication Dose Route Frequency  midodrine (PROAMATINE) tablet 5 mg  5 mg Oral TID WITH MEALS  ferrous sulfate tablet 325 mg  1 Tab Oral DAILY WITH BREAKFAST  fentaNYL citrate (PF) injection 25-50 mcg  25-50 mcg IntraVENous Q2H PRN  
 ondansetron (ZOFRAN) injection 4 mg  4 mg IntraVENous Q6H PRN  Vancomycin Pharmacy Dosing   Other Rx Dosing/Monitoring  dextrose 5 % - 0.45% NaCl infusion  100 mL/hr IntraVENous CONTINUOUS  
 vancomycin (VANCOCIN) 1,000 mg in 0.9% sodium chloride (MBP/ADV) 250 mL  1,000 mg IntraVENous Q16H  white petrolatum-mineral oiL (AKWA TEARS) 83-15 % ophthalmic ointment   Both Eyes Q12H  piperacillin-tazobactam (ZOSYN) 3.375 g in 0.9% sodium chloride (MBP/ADV) 100 mL  3.375 g IntraVENous Q8H  
  sodium chloride (NS) flush 5-40 mL  5-40 mL IntraVENous Q8H  
 sodium chloride (NS) flush 5-40 mL  5-40 mL IntraVENous PRN  
 acetaminophen (TYLENOL) tablet 650 mg  650 mg Oral Q6H PRN Or  
 acetaminophen (TYLENOL) suppository 650 mg  650 mg Rectal Q6H PRN  polyethylene glycol (MIRALAX) packet 17 g  17 g Oral DAILY PRN  
 PHENYLephrine (NANCY-SYNEPHRINE) 30 mg in 0.9% sodium chloride 250 mL infusion   mcg/min IntraVENous TITRATE  famotidine (PF) (PEPCID) 20 mg in 0.9% sodium chloride 10 mL injection  20 mg IntraVENous Q12H  
 
 
 
 LAB AND IMAGING FINDINGS:  
 
Lab Results Component Value Date/Time WBC 26.8 (H) 11/02/2020 04:34 AM  
 HGB 8.7 (L) 11/02/2020 04:34 AM  
 PLATELET 45 (LL) 47/31/4444 04:34 AM  
 
Lab Results Component Value Date/Time Sodium 144 11/02/2020 04:34 AM  
 Potassium 3.8 11/02/2020 04:34 AM  
 Chloride 115 (H) 11/02/2020 04:34 AM  
 CO2 23 11/02/2020 04:34 AM  
 BUN 22 (H) 11/02/2020 04:34 AM  
 Creatinine 0.66 11/02/2020 04:34 AM  
 Calcium 6.9 (L) 11/02/2020 04:34 AM  
 Magnesium 2.0 10/31/2020 05:55 AM  
 Phosphorus 3.9 10/31/2020 05:55 AM  
  
Lab Results Component Value Date/Time Alk. phosphatase 87 10/31/2020 05:55 AM  
 Protein, total 5.2 (L) 10/31/2020 05:55 AM  
 Albumin 2.3 (L) 10/31/2020 05:55 AM  
 Globulin 2.9 10/31/2020 05:55 AM  
 
Lab Results Component Value Date/Time INR 1.4 (H) 10/30/2020 08:06 PM  
 Prothrombin time 14.1 (H) 10/30/2020 08:06 PM  
 aPTT 34.4 (H) 10/31/2020 06:47 PM  
  
Lab Results Component Value Date/Time Iron 15 (L) 10/31/2020 06:47 PM  
 TIBC 218 (L) 10/31/2020 06:47 PM  
 Iron % saturation 7 (L) 10/31/2020 06:47 PM  
 Ferritin 125 10/31/2020 06:47 PM  
  
No results found for: PH, PCO2, PO2 No components found for: [place] No results found for: CPK, CKMB Total time: 45 min Counseling / coordination time, spent as noted above: 40 min 
> 50% counseling / coordination?: y 
 
 Prolonged service was provided for  []30 min   []75 min in face to face time in the presence of the patient, spent as noted above. Time Start:  
Time End:  
Note: this can only be billed with 21938 (initial) or 06296 (follow up). If multiple start / stop times, list each separately.

## 2020-11-02 NOTE — CONSULTS
Comprehensive Nutrition Assessment Reason for Visit: Consult - protein calorie malnutrition Nutrition Recommendations/Plan: 1. SLP consult evaluate safety of PO & ability to meet needs via PO 
2. DHT & confirm placement to begin Tube feeding 3. Osmolite 1.5 @20 ml/hr, increase by 10mL every 12 hours to goal of 45mL/hr 
4. Flush with 30mL free water q4hrs around the clock until TF at goal & IVF are reduced/stopped, then increase flushes to 130ml q4hrs around the clock. Nutrition Assessment:    
70 y.o. female who has past history of Breast Ca with Radiation Therapy from 2005 but otherwise, reports not chronic medical conditions. Admitted with Pleural effusion, left, Acute respiratory failure with hypoxia, tachycardia, Leukocytosis, anemia, thrombocytopenia. S/P Left VATS pleural bx with concerns for malignancy, awaiting pathology. Pt is very weak, laying in bed, nods to answer brief questions from RD. NPO 2/2 weak cough and risk of aspiration. SLP should be consulted to evaluate swallow. If pt is safe to swallow, pt is likely too weak to be able to meet needs on her own at this time. Nutrition support is indicated. Consider placing DHT & begin nutrition support after pt & son meet with palliative care (planned 11/2). Wt appears to have been trending up since admission. RN notes minimal urine output, left chest tube draining, initially evacuated 3L of pleural fluid when placed. Nutrition focused physical exam completed with results as below finding signs of severe malnutrition. Tube feeding at goal provide: 1080mL of TF, 1620 kcal/d, 68g pro/d, 1600mL of water from flushes & TF to meet 100% of estimated calorie/protein needs. Malnutrition Assessment: 
Malnutrition Status:  Severe malnutrition Context:  Acute illness Findings of the 6 clinical characteristics of malnutrition:  
Energy Intake:  7 - 50% or less of est energy requirements for 5 or more days Weight Loss:  Unable to assess Body Fat Loss:  7 - Moderate body fat loss, Fat overlying ribs, Buccal region, Orbital  
Muscle Mass Loss:  7 - Moderate muscle mass loss, Temples (temporalis), Hand (interosseous) Fluid Accumulation:  7 - Moderate to severe, Extremities, Generalized  Strength:  Not performed Nutritionally Significant Medications:  
D5/0.45%NS @100ml/hr (~400 kcal/d), pepcid, Fe, Stuart, Zosyn, Miralax PRN, Vanc Estimated Daily Nutrient Needs: 
Energy (kcal):  1636 kcal/d (30 kcal/kg of admit wt) Protein (g):  65 - 76g (1.2 -1.4g/kg) Fluid (ml/day):  1mL/kcal 
 
Nutrition Related Findings:   
Edema: 
Facial: 1+ scleral 
LLE: 2+ pitting RLE: 2+ pitting Last BM: PTA ABD: semi-soft, firm LUQ & LLQ Wounds:   
Surgical incision(Left port & chest tube site) Current Nutrition Therapies: DIET NPO Except Meds Meal intake: No data found. NPO Anthropometric Measures: 
· Height:  5' (152.4 cm) · Current Body Wt:  60.2 kg (132 lb 11.5 oz) · Admission Body Wt:  120 lb · Usual Body Wt:    unsure · Ideal Body Wt:    lb:  132.7 % · Adjusted Body Weight:   ; Weight Adjustment for: No adjustment · Adjusted BMI:      
· BMI Categories:  Overweight (BMI 25.0-29. 9) Wt Readings from Last 20 Encounters:  
11/02/20 61.7 kg (136 lb 0.4 oz) Last 3 Recorded Weights in this Encounter 10/31/20 0636 11/01/20 0422 11/02/20 0155 Weight: 56.4 kg (124 lb 5.4 oz) 59.1 kg (130 lb 4.7 oz) 61.7 kg (136 lb 0.4 oz) RD checked bed scale wt: 60.2 kg (11/02/20) Nutrition Diagnosis:  
· Inadequate protein-energy intake related to impaired respiratory function, catabolic illness as evidenced by NPO or clear liquid status due to medical condition, severe muscle loss, severe loss of subcutaneous fat, localized or generalized fluid accumulation Nutrition Interventions:  
Food and/or Nutrient Delivery: Start tube feeding after palliative & SLP evaluation Nutrition Education and Counseling: No recommendations at this time Coordination of Nutrition Care: Swallow evaluation, Speech therapy, Continue to monitor while inpatient Goals: 
provide at least 75% of estimated nutrient needs within 2-3 days; tolerance of nutrition support within 1-2 days Nutrition Monitoring and Evaluation:  
Behavioral-Environmental Outcomes:   
Food/Nutrient Intake Outcomes: Enteral nutrition intake/tolerance Physical Signs/Symptoms Outcomes: Biochemical data, Nutrition focused physical findings, Fluid status or edema, Weight Discharge Planning: Too soon to determine Recent Labs 11/02/20 
0434 11/01/20 
0415 10/31/20 
0555 10/30/20 
2006 * 83 65 86 BUN 22* 22* 24* 22* CREA 0.66 0.59 0.66 0.62  145 146* 144  
K 3.8 3.8 4.1 3.9 * 114* 113* 111* CO2 23 25 26 27  
CA 6.9* 7.5* 7.4* 7.3*  
PHOS  --   --  3.9 3.7 MG  --   --  2.0 2.0 Recent Labs 10/31/20 
0555 10/30/20 
2006 ALT 9* 10* AP 87 105 TBILI 0.7 0.5 TP 5.2* 5.8* ALB 2.3* 2.2*  
GLOB 2.9 3.6 Recent Labs 10/30/20 
2006 LAC 1.4 Recent Labs 11/02/20 
032 433 92 68 11/01/20 
0601 WBC 26.8* 34.1* HGB 8.7* 8.7* HCT 31.6* 30.3* PLT 45* 55* No results for input(s): TRIGL in the last 72 hours. Recent Labs 10/31/20 
1854 GLUCPOC 102* Iron Profile Iron Date Value Ref Range Status 10/31/2020 15 (L) 35 - 150 ug/dL Final  
 
TIBC Date Value Ref Range Status 10/31/2020 218 (L) 250 - 450 ug/dL Final  
 
Iron % saturation Date Value Ref Range Status 10/31/2020 7 (L) 20 - 50 % Final  
 
 
Ferritin Date Value Ref Range Status 10/31/2020 125 8 - 252 NG/ML Final  
 
B Vitamins Folate Date Value Ref Range Status 10/31/2020 15.5 5.0 - 21.0 ng/mL Final  
 
Vitamin B12 Date Value Ref Range Status 10/31/2020 967 193 - 986 pg/mL Final  
 
 
Omar Lopez, RD, MS Available via [employer]

## 2020-11-02 NOTE — PROGRESS NOTES
Problem: Dysphagia (Adult) Goal: *Acute Goals and Plan of Care (Insert Text) Description: Speech Therapy Goals Initiated 11/2/2020 1. Patient will tolerate pureed diet/thin liquids without adverse effects within 7 days. 2. Patient will resume baseline diet without adverse effects within 7 days. Outcome: Progressing Towards Goal 
  
SPEECH LANGUAGE PATHOLOGY BEDSIDE SWALLOW EVALUATION Patient: Abril Carlton (80 y.o. female) Date: 11/2/2020 Primary Diagnosis: Pleural effusion, left [J90] Acute respiratory failure with hypoxia ([address] [J96.01] Tachycardia [R00.0] Leukocytosis [D72.829] SIRS (systemic inflammatory response syndrome) (HCC) [R65.10] Anemia [D64.9] Neutropenia ([address] [D70.9] Thrombocytopenia ([address] [D69.6] Procedure(s) (LRB): LEFT VATS PLEURAL BIOPSIES POSSIBLE TALC PLEURODESIS/ (Left) 3 Days Post-Op Precautions:     
 
ASSESSMENT : 
Based on the objective data described below, the patient presents with slightly elevated risk for prandial aspiration given recent extubation and weaker cough. However, pt with good voicing in conversational speech and thus suspect weaker cough 2/2 congestion/laryngeal edema over vocal fold dysfunction. However, given pt with recent extubation and overall weakness, recommend proceeding cautiously and initiating diet as outlined below. Suspect as extubation becomes more remote, swallow function will improve. Patient will benefit from skilled intervention to address the above impairments. Patients rehabilitation potential is considered to be Good PLAN : 
Recommendations and Planned Interventions: 
--Pureed diet/thin liquids 
--Meds in applesauce 
--General aspiration precautions Frequency/Duration: Patient will be followed by speech-language pathology 3 times a week to address goals. Discharge Recommendations: To Be Determined SUBJECTIVE:  
Patient stated, \"My hiney hurts. OBJECTIVE:  
 History reviewed. No pertinent past medical history. History reviewed. No pertinent surgical history. Diet prior to admission: Regular diet/thin liquids Current Diet:  NPO Cognitive and Communication Status: 
Neurologic State: Alert Orientation Level: Oriented X4 Cognition: Follows commands Perception: Appears intact Perseveration: No perseveration noted Safety/Judgement: Awareness of environment Oral Assessment: 
Oral Assessment Labial: No impairment Dentition: Natural 
Oral Hygiene: oral mucosa moist and clear of secretions Lingual: No impairment Velum: No impairment Mandible: No impairment P.O. Trials: 
Patient Position: upright in bed Vocal quality prior to P.O.: Low volume Consistency Presented: Thin liquid;Puree How Presented: Self-fed/presented;Cup/sip;Spoon Bolus Acceptance: No impairment Bolus Formation/Control: Impaired Type of Impairment: Delayed Propulsion: Delayed (# of seconds) Oral Residue: None Initiation of Swallow: No impairment Laryngeal Elevation: Functional 
Aspiration Signs/Symptoms: None Pharyngeal Phase Characteristics: No impairment, issues, or problems Oral Phase Severity: Mild Pharyngeal Phase Severity : Other (comment)(slightly elevated risk) NOMS:  
The NOMS functional outcome measure was used to quantify this patient's level of swallowing impairment. Based on the NOMS, the patient was determined to be at level 4 for swallow function NOMS Swallowing Levels: 
Level 1 (CN): NPO Level 2 (CM): NPO but takes consistency in therapy Level 3 (CL): Takes less than 50% of nutrition p.o. and continues with nonoral feedings; and/or safe with mod cues; and/or max diet restriction Level 4 (CK): Safe swallow but needs mod cues; and/or mod diet restriction; and/or still requires some nonoral feeding/supplements Level 5 (CJ): Safe swallow with min diet restriction; and/or needs min cues Level 6 (CI): Independent with p.o.; rare cues; usually self cues; may need to avoid some foods or needs extra time Level 7 (CH): Independent for all p.o. ELLIE. (2003). National Outcomes Measurement System (NOMS): Adult Speech-Language Pathology User's Guide. Pain: 
Pain Scale 1: Numeric (0 - 10) Pain Intensity 1: 0 After treatment:  
Call bell within reach and Nursing notified COMMUNICATION/EDUCATION:  
 
The patient's plan of care including recommendations, planned interventions, and recommended diet changes were discussed with: Registered nurse. Patient is unable to participate in goal setting and plan of care. Thank you for this referral. 
Hilary Johnson, SLP Time Calculation: 20 mins

## 2020-11-02 NOTE — PROGRESS NOTES
1930-bedside report received from CHAU FERRIS RN using sbar format 
0730-bedside report given to RN using sbar format

## 2020-11-02 NOTE — PROGRESS NOTES
Thoracic Surgery Simple Progress Note Admit Date: 10/30/2020 POD: 3 Days Post-Op Procedure:  Procedure(s): LEFT VATS PLEURAL BIOPSIES POSSIBLE TALC PLEURODESIS/ 
 
 
Subjective:  
 
Patient has complaints: No significant medical complaints Review of Systems: 
 
CARDIAC: negative RESP: positive for respiratory failure and left pleural effusion NEURO:  negative INCISION: Clean, dry, and intact EXT: Denies new swelling or pain in the legs or calves. Objective:  
 
Blood pressure (!) 102/56, pulse 94, temperature 99.2 °F (37.3 °C), resp. rate 13, height 5' (1.524 m), weight 136 lb 0.4 oz (61.7 kg), SpO2 100 %. Temp (24hrs), Av.2 °F (36.8 °C), Min:97.3 °F (36.3 °C), Max:99.2 °F (37.3 °C) Hemodynamics PAP 
  CO 
  CI 
 
 07 - 1900 In: 100 [I.V.:100] Out: 90 [Urine:60] 
10/31 1901 -  0700 In: 8006 [I.V.:7956] Out: 2970 [HUWTY:1899] EXAM: 
GENERAL: VSS, afrible, alert and cooperative HEART:  regular rate and rhythm, still on Stuart but being weaned down now on 100 mcg LUNG: rhonchi scattered thru out, extubated yesterday, she has very weak cough, Oxygen via NC 4 liters with sat of 100% NEURO: responds to her name and follows verbal commands INCISION: Clean, dry, and intact EXTREMITIES:No evidence of DVT seen on physical exam. 
GI/: Abd soft, nonterder with + bowel sounds. Hodges in place Labs: 
Recent Results (from the past 24 hour(s)) METABOLIC PANEL, BASIC Collection Time: 20  4:34 AM  
Result Value Ref Range Sodium 144 136 - 145 mmol/L Potassium 3.8 3.5 - 5.1 mmol/L Chloride 115 (H) 97 - 108 mmol/L  
 CO2 23 21 - 32 mmol/L Anion gap 6 5 - 15 mmol/L Glucose 106 (H) 65 - 100 mg/dL BUN 22 (H) 6 - 20 MG/DL Creatinine 0.66 0.55 - 1.02 MG/DL  
 BUN/Creatinine ratio 33 (H) 12 - 20 GFR est AA >60 >60 ml/min/1.73m2 GFR est non-AA >60 >60 ml/min/1.73m2  Calcium 6.9 (L) 8.5 - 10.1 MG/DL  
CBC WITH AUTOMATED DIFF  
 Collection Time: 11/02/20  4:34 AM  
Result Value Ref Range WBC 26.8 (H) 3.6 - 11.0 K/uL  
 RBC 3.50 (L) 3.80 - 5.20 M/uL HGB 8.7 (L) 11.5 - 16.0 g/dL HCT 31.6 (L) 35.0 - 47.0 % MCV 90.3 80.0 - 99.0 FL  
 MCH 24.9 (L) 26.0 - 34.0 PG  
 MCHC 27.5 (L) 30.0 - 36.5 g/dL  
 RDW 19.9 (H) 11.5 - 14.5 % PLATELET 45 (LL) 070 - 400 K/uL MPV 9.7 8.9 - 12.9 FL  
 NRBC 0.7 (H) 0  WBC ABSOLUTE NRBC 0.19 (H) 0.00 - 0.01 K/uL NEUTROPHILS 30 (L) 32 - 75 % LYMPHOCYTES 68 (H) 12 - 49 % MONOCYTES 2 (L) 5 - 13 % EOSINOPHILS 0 0 - 7 % BASOPHILS 0 0 - 1 % IMMATURE GRANULOCYTES 0 %  
 ABS. NEUTROPHILS 8.0 1.8 - 8.0 K/UL  
 ABS. LYMPHOCYTES 18.3 (H) 0.8 - 3.5 K/UL  
 ABS. MONOCYTES 0.5 0.0 - 1.0 K/UL  
 ABS. EOSINOPHILS 0.0 0.0 - 0.4 K/UL  
 ABS. BASOPHILS 0.0 0.0 - 0.1 K/UL  
 ABS. IMM. GRANS. 0.0 K/UL  
 DF MANUAL    
 RBC COMMENTS ANISOCYTOSIS 1+ 
    
 RBC COMMENTS HYPOCHROMIA 1+ 
    
 RBC COMMENTS POLYCHROMASIA 1+ WBC COMMENTS SMUDGE CELLS SEEN Assessment:  
No evidence of DVT. Principal Problem: 
  Acute respiratory failure with hypoxia ([facility] [address] (10/30/2020) Active Problems: 
  Leukocytosis (10/30/2020) Neutropenia ([address] (10/30/2020) Anemia (10/30/2020) Tachycardia (10/30/2020) Thrombocytopenia ([address] (10/30/2020) SIRS (systemic inflammatory response syndrome) ([facility] [address] (10/30/2020) Pleural effusion, left (10/30/2020) PATH: pending Plan/Recommendations: CXR results pending Chest tube to gravity See orders Signed By: Dina Nix FNP

## 2020-11-02 NOTE — PROGRESS NOTES
0745Pekarla Gross NP with thoracic at bedside, [facility]  
 
1715: While turning pt this RN inspecting pt's sacrum- mepilex foam in place. Unpeeled and revealed a dark area on pt's sacrum. Changed mepilex foam dressing to clean, new one. Orders placed in [facility] for [facility]OCN consult. Pt reeducated on importance of turning and repositioning q2 hours as pt has been refusing turns occasionally throughout the day. Pt placed on [facility] InTouch bed.

## 2020-11-02 NOTE — PROGRESS NOTES
Problem: Breathing Pattern - Ineffective Goal: *Absence of hypoxia Outcome: Progressing Towards Goal 
Goal: *Use of effective breathing techniques Outcome: Progressing Towards Goal 
  
Problem: Patient Education: Go to Patient Education Activity Goal: Patient/Family Education Outcome: Progressing Towards Goal 
  
Problem: Falls - Risk of 
Goal: *Absence of Falls Description: Document Cassie Hillccasin Fall Risk and appropriate interventions in the flowsheet. Outcome: Progressing Towards Goal 
Note: Fall Risk Interventions: 
Mobility Interventions: Communicate number of staff needed for ambulation/transfer, OT consult for ADLs, Patient to call before getting OOB, PT Consult for mobility concerns, PT Consult for assist device competence, Strengthening exercises (ROM-active/passive), Utilize walker, cane, or other assistive device, Utilize gait belt for transfers/ambulation Mentation Interventions: Adequate sleep, hydration, pain control, Door open when patient unattended, Evaluate medications/consider consulting pharmacy, Eyeglasses and hearing aids, Familiar objects from home, Gait belt with transfers/ambulation, Increase mobility, More frequent rounding, Reorient patient, Room close to nurse's station, Toileting rounds, Update white board Medication Interventions: Assess postural VS orthostatic hypotension, Evaluate medications/consider consulting pharmacy, Patient to call before getting OOB, Teach patient to arise slowly, Utilize gait belt for transfers/ambulation Elimination Interventions: Call light in reach, Elevated toilet seat, Patient to call for help with toileting needs, Stay With Me (per policy), Toilet paper/wipes in reach, Toileting schedule/hourly rounds Problem: Pressure Injury - Risk of 
Goal: *Prevention of pressure injury Description: Document Mauro Scale and appropriate interventions in the flowsheet. Outcome: Progressing Towards Goal 
Note: Pressure Injury Interventions: Sensory Interventions: Assess changes in LOC, Assess need for specialty bed, Avoid rigorous massage over bony prominences, Chair cushion, Check visual cues for pain, Discuss PT/OT consult with provider, Float heels, Keep linens dry and wrinkle-free, Maintain/enhance activity level, Minimize linen layers, Monitor skin under medical devices, Pad between skin to skin, Pressure redistribution bed/mattress (bed type), Sit a 90-degree angle/use footstool if needed, Turn and reposition approx. every two hours (pillows and wedges if needed), Use 30-degree side-lying position Moisture Interventions: Absorbent underpads, Apply protective barrier, creams and emollients, Assess need for specialty bed, Check for incontinence Q2 hours and as needed, Contain wound drainage, Internal/External fecal devices, Internal/External urinary devices, Limit adult briefs, Maintain skin hydration (lotion/cream), Minimize layers, Moisture barrier, Offer toileting Q_hr Activity Interventions: Assess need for specialty bed, Chair cushion, Increase time out of bed, Pressure redistribution bed/mattress(bed type), PT/OT evaluation Mobility Interventions: Assess need for specialty bed, Chair cushion, Float heels, HOB 30 degrees or less, Pressure redistribution bed/mattress (bed type), PT/OT evaluation, Turn and reposition approx. every two hours(pillow and wedges) Nutrition Interventions: Document food/fluid/supplement intake, Discuss nutritional consult with provider, Offer support with meals,snacks and hydration Friction and Shear Interventions: Apply protective barrier, creams and emollients, Feet elevated on foot rest, Foam dressings/transparent film/skin sealants, HOB 30 degrees or less, Lift sheet, Lift team/patient mobility team, Minimize layers, Sit at 90-degree angle Problem: Patient Education: Go to Patient Education Activity Goal: Patient/Family Education Outcome: Progressing Towards Goal 
  
 Problem: Non-Violent Restraints Goal: *Removal from restraints as soon as assessed to be safe Outcome: Resolved/Met Goal: *No harm/injury to patient while restraints in use Outcome: Resolved/Met Goal: *Patient's dignity will be maintained Outcome: Resolved/Met Goal: *Patient Specific Goal (EDIT GOAL, INSERT TEXT) Outcome: Resolved/Met Goal: Non-violent Restaints:Standard Interventions Outcome: Resolved/Met Goal: Non-violent Restraints:Patient Interventions Outcome: Resolved/Met Goal: Patient/Family Education Outcome: Resolved/Met Problem: Ventilator Management Goal: *Adequate oxygenation and ventilation Outcome: Resolved/Met Goal: *Patient maintains clear airway/free of aspiration Outcome: Resolved/Met Goal: *Absence of infection signs and symptoms Outcome: Resolved/Met Goal: *Normal spontaneous ventilation Outcome: Resolved/Met Problem: Patient Education: Go to Patient Education Activity Goal: Patient/Family Education Outcome: Resolved/Met

## 2020-11-02 NOTE — PROGRESS NOTES
[facility] 
 
ICU TEAM Progress Note Name: Jessica Goff : 1945 MRN: [id number] Date: 2020 Assessment:  
 
ICU Problems: 1. Acute respiratory failure with hypoxia s/p intubation post op 2. Large left pleural effusion s/p VATS with CT placement with Thoracic Surgery. ?Malignancy, pleural sample sent for cytology. 3. Symptomatic anemia s/p 2 PRBCs 4. SIRS (systemic inflammatory response syndrome) 5. Leukocytosis 6. Thrombocytopenia 7. Edema of both lower extremities 8. Massive splenomegaly with retroperitoneal adenopathy on CT abd/pelvis findings concerning for possible lymphoproliferative disorder ICU Comprehensive Plan of Care:  
 
Plans for this Shift:  
 
1. Daily CBC, BMP 2. Consult heme onc, possible malignant effusion. Path pending. 3. F/U pending pleural fluid cytology and lung biopsy 4. Thoracic surgery following, appreciate input, Chest tube placed to water seal this morning by thoracics. 5. Strict I&Os continue benítez 6. Platelets 45, will hold off on transfusion, Hgb stable at this time. 7. SBP Goal of: > 90 mmHg 8. MAP Goal of: > 65 mmHg 9. Phenylephrine - For above SBP/MAP goals 10. IVFs: D5 0.45% NS at 100 
11. Transfusion Trigger (Hgb): <7 g/dL 12. Respiratory Goals: 
a. Chlorhexidine  
b. Optimize PEEP/Ventilation/Oxygenation 
c. Goal Tidal Volume 6 cc/kg based on IBW 
d. Aim for lung protective ventilation 
e. Head of bed > 30 degrees 
f. Plan to Extubate: Plan for SBT tomorrow am 
13. Pulmonary toilet: Albuterol  PRN 14. SpO2 Goal: > 92% per vent, wean FiO2 as tolerated 15. Keep K>4; Mg>2 16. PT/OT: PT consulted and on board, OT consulted and on board and Speech therapy consulted and on board 17. Discussed Plan of Care/Code Status: Full Code, Palliative care following, planning to meet with son 2020 18. Appreciate Consultants Input 19.  Discussed Care Plan with Bedside RN 
 20. Documentation of Current Medications 21. Rest of Plan Below: 
 
F - Feeding:  No  
A - Analgesia: Fentanyl S - Sedation: Propofol T - DVT Prophylaxis: SCD's or Sequential Compression Device H - Head of Bed: > 30 Degrees U - Ulcer Prophylaxis: Pepcid (famotidine) G - Glycemic Control: Insulin S - Spontaneous Breathing Trial: Pending B - Bowel Regimen: MiraLax I - Indwelling Catheter: 
 Tubes: ETT, Chest Tube left and Orogastric Tube Lines: Peripheral IV and Arterial Line Drains: Hodges Catheter D - De-escalation of Antibiotics: Zosyn Subjective:  
Progress Note: 11/2/2020 Patient is asked to be seen by Dr. Jeffery Mcadams for, post VATS with respiratory failure care necessitating the need for possible ICU care.    
 
Reason for ICU Admission: Post VATS  
HPI: 
 Phyllis Scheuermann Ruffin is a 70 y. o. female with past medical history of left breast cancer, s/p radiation, and childhood asthma presented to the ED from home on 10/30/20 with chief complaint of SOB, leg swelling, and weight loss. Symptoms reportedly have been gradual in onset, worsening, now severe, constant, with SOB, LOPEZ, orthopnea. Patient reported no known chronic medical conditions. There were no reports of recent sick contacts or exposure to anyone with COVID 19. Per ED MD report, patient has became increasingly dyspneic, with increased supplemental oxygen requirement. Patient had no known prior lung disease. CTA chest revealed a large left pleural effusion with left lung atelectasis. Labs revealed WBC = 27.2, neutrophils = 2%, and absolute neutrophil count = 0.5.  Hemoglobin = 6.4 (with no comparison lab available for review). ED offered PRBC transfusion but patient reportedly refused initally.  She was started on Levofloxacin 750 mg IV for antibiotic coverage. Was admitted under the hospitalist service. CTS was consulted and plan for VATS later in day. Pulmonary was also consulted as well as Palliative care who spoke with patient and son. Agreed for blood products. She was given 2 units PRBCs and then taken to OR for Left VATS. While in OR patient was intubated for respiratory instability and procedure. Hemodynamically unstable requiring low dose stuart and arterial line was placed. Left VATS with pleural biopsis was completed and patient was taken to PACU intubated. Critical care was consulted for post op management. Patient in PACU withdraws on all ext. Left chest tube with sanguinous drainage. Patient on 60 mcg/min of Stuart synephrine via PIV, may need CVL placed if increased pressor requirements. Patient to remain intubated overnight. Awaiting bed in ICU. POD: 
Day of Surgery S/P:  
Procedure(s): LEFT VATS PLEURAL BIOPSIES POSSIBLE TALC PLEURODESIS/ Active Problem List:  
 
 Problem List  Date Reviewed: 10/30/2020 Codes Class Leukocytosis ICD-10-CM: N23.305 ICD-9-CM: 288.60 Neutropenia ([facility] ICD-10-CM: D70.9 ICD-9-CM: 288.00 Anemia ICD-10-CM: D64.9 ICD-9-CM: 285.9 Tachycardia ICD-10-CM: R00.0 ICD-9-CM: 137. 0 Thrombocytopenia ([facility] ICD-10-CM: D69.6 ICD-9-CM: 287.5 SIRS (systemic inflammatory response syndrome) (HCC) ICD-10-CM: R65.10 ICD-9-CM: 995.90 Pleural effusion, left ICD-10-CM: J90 ICD-9-CM: 511.9 * (Principal) Acute respiratory failure with hypoxia (HCC) ICD-10-CM: J96.01 
ICD-9-CM: 518.81 Past Medical History:  
 
 has no past medical history on file. Past Surgical History:  
 
 has no past surgical history on file. Home Medications:  
 
Prior to Admission medications Not on File Allergies/Social/Family History: No Known Allergies Social History Tobacco Use  Smoking status: Not on file Substance Use Topics  Alcohol use: Not on file History reviewed. No pertinent family history. Review of Systems:  
 
Review of systems not obtained due to patient factors. Objective:  
Vital Signs: 
Visit Vitals /64 Pulse 93 Temp 99.2 °F (37.3 °C) Resp 13 Ht 5' (1.524 m) Wt 61.7 kg (136 lb 0.4 oz) SpO2 100% BMI 26.57 kg/m² O2 Flow Rate (L/min): 4 l/min O2 Device: Nasal cannula Temp (24hrs), Av.2 °F (36.8 °C), Min:97.3 °F (36.3 °C), Max:99.2 °F (37.3 °C) Intake/Output:  
 
Intake/Output Summary (Last 24 hours) at 2020 0296 Last data filed at 2020 0800 Gross per 24 hour Intake 4640.88 ml Output 2135 ml Net 2505.88 ml Physical Exam: 
 
General:  appears stated age, off vent Eye:  conjunctivae/corneas clear. Pupils round and reactive Neurologic:  Awake and alert. Lymphatic:  Cervical, supraclavicular, and axillary nodes normal.  
Neck:  normal and no erythema or exudates noted. Lungs: Clear to auscultation Heart:  regular rate and rhythm, S1, S2 normal, no murmur, click, rub or gallop Abdomen:  soft, mild distension. Hypoactive bowel sounds normal. No masses, no organomegaly Cardiovascular:  Regular rate and rhythm, S1S2 present, without murmur or extra heart sounds, pedal pulses normal and no edema Skin:  Normal. and no rash or abnormalities LABS AND  DATA: Personally reviewed Recent Labs 11/02/20 
032 433 92 68 11/01/20 
1754 WBC 26.8* 34.1* HGB 8.7* 8.7* HCT 31.6* 30.3* PLT 45* 55* Recent Labs 11/02/20 
0434 11/01/20 
0415 10/31/20 
0555 10/30/20 
2006  145 146* 144  
K 3.8 3.8 4.1 3.9 * 114* 113* 111* CO2 23 25 26 27 BUN 22* 22* 24* 22* CREA 0.66 0.59 0.66 0.62 * 83 65 86  
CA 6.9* 7.5* 7.4* 7.3*  
MG  --   --  2.0 2.0 PHOS  --   --  3.9 3.7 Recent Labs 10/31/20 
0555 10/30/20 
2006 AP 87 105  
TP 5.2* 5.8* ALB 2.3* 2.2*  
GLOB 2.9 3.6 Recent Labs 10/31/20 
1847 10/30/20 
2006 INR  --  1.4* PTP  --  14.1* APTT 34.4*  -- No results for input(s): PHI, PCO2I, PO2I, FIO2I in the last 72 hours. No results for input(s): CPK, CKMB, TROIQ, BNPP in the last 72 hours. Hemodynamics:  
PAP:   CO:    
Wedge:   CI:    
CVP:    SVR: BP 2: 010/93(7883) (10/30/20 1700) PVR:    
 
Ventilator Settings: 
Mode Rate Tidal Volume Pressure FiO2 PEEP Spontaneous   350 ml  5 cm H2O 50 % 5 cm H20 Peak airway pressure: 9.8 cm H2O Minute ventilation: 5.29 l/min MEDS: Reviewed Chest X-Ray: CXR Results  (Last 48 hours) None CT Results  (Last 48 hours) None ECHO: 10/30/20 Interpretation Summary Result status: Final result · LV: Estimated LVEF is 45 - 50%. Normal cavity size and wall thickness. Inconclusive left ventricular diastolic function. · LA: Moderately dilated left atrium. · MV: Mild mitral valve regurgitation is present. · TV: Moderate tricuspid valve regurgitation is present. · PA: Moderate pulmonary hypertension. · Pericardium: There is a large left pleural effusion. Multidisciplinary Rounds Completed:  Pending ABCDEF Bundle/Checklist Completed: 
Yes SPECIAL EQUIPMENT None DISPOSITION Stay in ICU CRITICAL CARE CONSULTANT NOTE I had a face to face encounter with the patient, reviewed and interpreted patient data including clinical events, labs, images, vital signs, I/O's, and examined patient. I have discussed the case and the plan and management of the patient's care with the consulting services, the bedside nurses and the respiratory therapist.   
 
NOTE OF PERSONAL INVOLVEMENT IN CARE This patient has a high probability of imminent, clinically significant deterioration, which requires the highest level of preparedness to intervene urgently. I participated in the decision-making and personally managed or directed the management of the following life and organ supporting interventions that required my frequent assessment to treat or prevent imminent deterioration. I personally spent 80 minutes of critical care time. This is time spent at this critically ill patient's bedside actively involved in patient care as well as the coordination of care and discussions with the patient's family. This does not include any procedural time which has been billed separately. [place] [facility]

## 2020-11-03 NOTE — PROGRESS NOTES
Thoracic Surgery Simple Progress Note Admit Date: 10/30/2020 POD: 4 Days Post-Op Procedure:  Procedure(s): LEFT VATS PLEURAL BIOPSIES POSSIBLE TALC PLEURODESIS/ 
 
 
Subjective:  
 
Patient has complaints: No significant medical complaints Review of Systems: 
 
CARDIAC: negative RESP: positive for respiratory failure and left pleural effusion NEURO:  negative INCISION: Clean, dry, and intact EXT: Denies new swelling or pain in the legs or calves Objective:  
 
Blood pressure 105/67, pulse (!) 101, temperature 97.1 °F (36.2 °C), resp. rate 21, height 5' (1.524 m), weight 136 lb 0.4 oz (61.7 kg), SpO2 100 %. Temp (24hrs), Av.4 °F (36.3 °C), Min:97 °F (36.1 °C), Max:97.7 °F (36.5 °C) Hemodynamics PAP 
  CO 
  CI No intake/output data recorded.  1901 -  0700 In: 6995.6 [I.V.:6995.6] Out: 2403 [Urine:] EXAM: 
GENERAL: VSS, afrible, alert and cooperative HEART:  regular rate and rhythm, off pressors LUNG: course breath sounds thur out, weak cough effort, remains on NC O2 at 4 L/Min with sat %, chest tube in place with 150 ml recorded output yesterday NEURO:  mental status normal, awake and oriented x iii, more responsive today INCISION: Clean, dry, and intact EXTREMITIES:No evidence of DVT seen on physical exam. 
GI/: Abd soft, nonterder with + bowel sounds. Hodges in place Labs: 
Recent Results (from the past 24 hour(s)) METABOLIC PANEL, BASIC Collection Time: 20  3:59 AM  
Result Value Ref Range Sodium 143 136 - 145 mmol/L Potassium 3.9 3.5 - 5.1 mmol/L Chloride 114 (H) 97 - 108 mmol/L  
 CO2 25 21 - 32 mmol/L Anion gap 4 (L) 5 - 15 mmol/L Glucose 136 (H) 65 - 100 mg/dL BUN 26 (H) 6 - 20 MG/DL Creatinine 0.75 0.55 - 1.02 MG/DL  
 BUN/Creatinine ratio 35 (H) 12 - 20 GFR est AA >60 >60 ml/min/1.73m2 GFR est non-AA >60 >60 ml/min/1.73m2  Calcium 7.3 (L) 8.5 - 10.1 MG/DL  
CBC WITH AUTOMATED DIFF  
 Collection Time: 11/03/20  3:59 AM  
Result Value Ref Range WBC 19.9 (H) 3.6 - 11.0 K/uL  
 RBC 2.83 (L) 3.80 - 5.20 M/uL HGB 6.9 (L) 11.5 - 16.0 g/dL HCT 25.9 (L) 35.0 - 47.0 % MCV 91.5 80.0 - 99.0 FL  
 MCH 24.4 (L) 26.0 - 34.0 PG  
 MCHC 26.6 (L) 30.0 - 36.5 g/dL  
 RDW 19.2 (H) 11.5 - 14.5 % PLATELET 31 (LL) 377 - 400 K/uL MPV 9.9 8.9 - 12.9 FL  
 NRBC 0.4 (H) 0  WBC ABSOLUTE NRBC 0.08 (H) 0.00 - 0.01 K/uL NEUTROPHILS 10 (L) 32 - 75 % LYMPHOCYTES 89 (H) 12 - 49 % MONOCYTES 1 (L) 5 - 13 % EOSINOPHILS 0 0 - 7 % BASOPHILS 0 0 - 1 % IMMATURE GRANULOCYTES 0 %  
 ABS. NEUTROPHILS 2.0 1.8 - 8.0 K/UL  
 ABS. LYMPHOCYTES 17.7 (H) 0.8 - 3.5 K/UL  
 ABS. MONOCYTES 0.2 0.0 - 1.0 K/UL  
 ABS. EOSINOPHILS 0.0 0.0 - 0.4 K/UL  
 ABS. BASOPHILS 0.0 0.0 - 0.1 K/UL  
 ABS. IMM. GRANS. 0.0 K/UL  
 DF MANUAL    
 RBC COMMENTS OVALOCYTES PRESENT 
    
 RBC COMMENTS ANISOCYTOSIS 1+ 
    
 RBC COMMENTS POLYCHROMASIA 1+ 
    
 RBC COMMENTS HYPOCHROMIA 1+ WBC COMMENTS SMUDGE CELLS SEEN Assessment:  
No evidence of DVT. Principal Problem: 
  Acute respiratory failure with hypoxia ([address] (10/30/2020) Active Problems: 
  Leukocytosis (10/30/2020) Neutropenia ([address] (10/30/2020) Anemia (10/30/2020) Tachycardia (10/30/2020) Thrombocytopenia ([address] (10/30/2020) SIRS (systemic inflammatory response syndrome) ([address] (10/30/2020) Pleural effusion, left (10/30/2020) PATH: 10/30/2020 Specimen Source 1: Pleural Fluid, Left, ThinPrep and cell block: CYTOLOGIC INTERPRETATION:  
Pleural Fluid, Left, ThinPrep:  
Lymphocytic pleural effusion. See comment. General Categorization Atypical, favor neoplastic process. Specimen Adequacy Satisfactory for evaluation. Comment Flow cytometric analysis is pending and will be reported in an addendum. Plan/Recommendations: Will leave chest tube in place today Depending on chest tube output, she may require placement of a pleurX catheter for long term management of this effusion CXR in AM 
Medical management by ICU team 
See orders Signed By: Vincent CLINTON

## 2020-11-03 NOTE — PROGRESS NOTES
Problem: Dysphagia (Adult) Goal: *Acute Goals and Plan of Care (Insert Text) Description: Speech Therapy Goals Initiated 11/2/2020 1. Patient will tolerate pureed diet/thin liquids without adverse effects within 7 days. 2. Patient will resume baseline diet without adverse effects within 7 days. Outcome: Progressing Towards Goal 
  
SPEECH LANGUAGE PATHOLOGY DYSPHAGIA TREATMENT Patient: Vanesa Oliver (77 y.o. female) Date: 11/3/2020 Diagnosis: Pleural effusion, left [J90] Acute respiratory failure with hypoxia ([address] [J96.01] Tachycardia [R00.0] Leukocytosis [D72.829] SIRS (systemic inflammatory response syndrome) (HCC) [R65.10] Anemia [D64.9] Neutropenia ([address] [D70.9] Thrombocytopenia ([address] [D69.6] Acute respiratory failure with hypoxia ([address] Procedure(s) (LRB): LEFT VATS PLEURAL BIOPSIES POSSIBLE TALC PLEURODESIS/ (Left) 4 Days Post-Op Precautions: aspiration ASSESSMENT: 
Patient with poor endurance and limited interactions this morning with minimal attempts at verbalizations. Tolerates small amounts of purees and thin liquids via single straw sips, but rapidly fatigued with increased RR with any exertion requiring frequent rest breaks. Also with limited ability to sustain alertness for prolonged periods. Overall, oropharyngeal strength does not seem significantly impaired, but her endurance, level of alertness and global weakness (did not make any attempts to assist with self-feeding despite cues today) do increase her risks for aspiration. PLAN: 
Recommendations and Planned Interventions: 
--recommend continue with puree/thin liquid diet with caution. Suspect she will only tolerate a few bites/sips at a time and benefit from frequent small snacks rather than full meals. Straws ok with single sips. Meds crushed. Provide frequent rest breaks and stop with increased work of breathing or fatigue. --will follow for diet tolerance and consideration of upgrade as her strength and respiratory endurance improve Patient continues to benefit from skilled intervention to address the above impairments. Continue treatment per established plan of care. Discharge Recommendations: To Be Determined SUBJECTIVE:  
Patient alert but limited attempts at verbalizations even when prompted OBJECTIVE:  
Cognitive and Communication Status: 
Neurologic State: Alert Orientation Level: Oriented to person(limited responses to questions ) Cognition: Decreased attention/concentration, Decreased command following Perception: Appears intact Perseveration: No perseveration noted Safety/Judgement: Not assessed Dysphagia Treatment: 
Oral Assessment: 
Oral Assessment Labial: No impairment Dentition: Natural 
Oral Hygiene: moist mucosa Lingual: (did not follow commands to assess ) Mandible: No impairment P.O. Trials: 
Patient Position: upright in bed Vocal quality prior to P.O.: Low volume(minimal attempts at verbalizations ) Consistency Presented: Thin liquid;Puree How Presented: SLP-fed/presented;Spoon;Straw;Successive swallows Bolus Acceptance: No impairment Bolus Formation/Control: Impaired Type of Impairment: Delayed(increased effort for manipulation ) Propulsion: Delayed (# of seconds) Oral Residue: None Initiation of Swallow: Delayed (# of seconds)(suspect intermittently delayed ) Laryngeal Elevation: Decreased Aspiration Signs/Symptoms: Increase in RR(increased RR with trials but also with any exertion ) Pharyngeal Phase Characteristics: Poor endurance;Easily fatigued Effective Modifications: (frequent rest breaks ) Cues for Modifications: Minimal-moderate Oral Phase Severity: Mild(with purees/thins) Pharyngeal Phase Severity : Mild(suspected at least ) Exercises: 
Laryngeal Exercises: Pain: 
Pain Scale 1: Numeric (0 - 10) Pain Intensity 1: 0 After treatment:  
Patient left in no apparent distress in bed, Call bell within reach, and Nursing notified COMMUNICATION/EDUCATION:  
 
The patient's plan of care including recommendations, planned interventions, and recommended diet changes were discussed with: Registered nurse. Virgil Casey M.CD. CCC-SLP Time Calculation: 15 mins

## 2020-11-03 NOTE — MED STUDENT NOTES
*ATTENTION: This note has been created by a nurse practitioner student for educational purposes only. Please do not refer to the content of this note for clinical decision-making, billing, or other purposes. Please see attending providers note to obtain clinical information on this patient. * [facility] 
 
ICU TEAM Progress Note Name: Barbara Wagner : 1945 MRN: [id number] Date: 11/3/2020 Assessment:  
 
ICU Problems: 1. Acute Respiratory Failure- now extubated 2. Large left pleural effusion s/p VATS with chest tube placement 3. Leukocytosis improving 4. Lymphadenopathy 5. Splenomegaly 6. Anemia secondary to blood loss via chest tube site 7. Acute on chronic thrombocytopenia ICU Comprehensive Plan of Care:  
 
Plans for this Shift: 1. Neuro/ Pain/ Sedation - Continue neuro checks Q4, continue PRN fentaynl and acetaminophen for pain. No sedation needed at this time 2. Resp -  Continue weaning Nasal Cannula. Chest tube in place. Per thoracics, may need longterm catheter for management of effusion. 3. CVS - Weaned off pressors, chest tube in place at water seal- serosanguinous drainage 4. Heme/ Onc- Hematology/Oncology following for leukocytosis. Continue blood transfusions for Hbg<7. Transfuse platelets <75F unless pt starts to hemorrhage. 5. GI - Splenomegaly- monitor for abdominal pain/discomfort. PRN fentanyl for pain 6. Renal/  - dc benítez 7. ID - leukocytosis continue zosyn for post op coverage 8. Endocrine - no acute problems 9. Metabolic - no acute problems 10. Musc/Integ- PT/OT consulted 11. SBP Goal of: > 90 mmHg 12. MAP Goal of: > 65 mmHg 13. None - For above SBP/MAP goals 14. IVFs: D5- 1/2 NS @100 
15. Transfusion Trigger (Hgb): <7 g/dL 16. Respiratory Goals: 
a. Head of bed > 30 degrees 17. Pulmonary toilet: None 18. SpO2 Goal: > 92% 19. Keep K>4; Mg>2  
20. PT/OT: N/A  
21. Discussed Plan of Care/Code Status: Full Code 25. Appreciate Consultants Input Pulmonary, thoracis surgery, hematology 23. Discussed Care Plan with Bedside RN 
24. Documentation of Current Medications 25. Rest of Plan Below: 
 
F - Feeding:  Yes Dysphagia Pureed may need NGT A - Analgesia: Fentanyl S - Sedation: None T - DVT Prophylaxis: SCD's or Sequential Compression Device H - Head of Bed: > 30 Degrees U - Ulcer Prophylaxis: Not at this time G - Glycemic Control: Insulin S - Spontaneous Breathing Trial: No 
B - Bowel Regimen: MiraLax I - Indwelling Catheter: 
 Tubes: Chest Tube left 28 Arabic Lines: Peripheral IV and [employer] Drains: Hodges Catheter D - De-escalation of Antibiotics: Zosyn Subjective:  
Progress Note: 11/3/2020 Reason for ICU Admission: Acute Hypoxic Respiratory Failure HPI: John Villanueva is a 70 y.o. female with past medical history of left breast cancer, s/p radiation, and childhood asthma presented to the ED on 10/30/20 from home with chief complaint of SOB, leg swelling, and weight loss. Symptoms reportedly have been gradual in onset, worsening, now severe, constant, with SOB, LOPEZ, orthopnea. Patient reported no known chronic medical conditions. There were no reports of recent sick contacts or exposure to anyone with COVID 19. On arrival in the ED, initially tachycardic in 120's, O2sat 89% on room air. Patient was initially placed on 2 liters nasal cannula. However patient became increasing dyspneic, with increased supplemental oxygen requirement. Patient had no known prior lung disease. CTA chest revealed a large left pleural effusion with left lung atelectasis. ED offered PRBC transfusion but patient reportedly refused. She was started on Levofloxacin 750 mg IV for antibiotic coverage and admitted to medical floor. CTS was consulted and plan for VATS later in day. Pulmonary was also consulted as well as Palliative care who spoke with patient and son. Agreed for blood products. She was given 2 units PRBCs and then taken to OR for Left VATS. While in OR patient was intubated for respiratory instability and procedure. Hemodynamically unstable requiring low dose stuart and arterial line was placed. Left VATS with pleural biopsis was completed and patient was taken to PACU intubated. Critical care was consulted for post op management.  
  
Left chest tube with sanguinous drainage. Patient on 60 mcg/min of Stuart synephrine via PIV, and later had CVL placed. Patient extubated to 4L Nasal cannula on 11/1/2020 with no acute distress with Neosynephrine turned off. Currently remains on 4L NC with O2 sats at 100% and no complaints of pain or discomfort at this time. Overnight events: 
Remains on 4L No acute events overnight Pain managed Chest tube water seal 
Will receive 1 unit PRBCs POD: 
4 Days Post-Op S/P:  
Procedure(s): LEFT VATS PLEURAL BIOPSIES POSSIBLE TALC PLEURODESIS/ Active Problem List:  
 
Problem List  Date Reviewed: 10/30/2020 Codes Class Leukocytosis ICD-10-CM: B23.344 ICD-9-CM: 288.60 Neutropenia ([facility] 75.) ICD-10-CM: D70.9 ICD-9-CM: 288.00 Anemia ICD-10-CM: D64.9 ICD-9-CM: 285.9 Tachycardia ICD-10-CM: R00.0 ICD-9-CM: 255. 0 Thrombocytopenia ([facility] 75.) ICD-10-CM: D69.6 ICD-9-CM: 287.5 SIRS (systemic inflammatory response syndrome) (HCC) ICD-10-CM: R65.10 ICD-9-CM: 995.90 Pleural effusion, left ICD-10-CM: J90 ICD-9-CM: 511.9 * (Principal) Acute respiratory failure with hypoxia (HCC) ICD-10-CM: J96.01 
ICD-9-CM: 518.81 Past Medical History:  
 
 has no past medical history on file. Past Surgical History:  
 
 has no past surgical history on file. Home Medications:  
 
Prior to Admission medications Not on File Allergies/Social/Family History: No Known Allergies Social History Tobacco Use  Smoking status: Not on file Substance Use Topics  Alcohol use: Not on file History reviewed. No pertinent family history. Review of Systems:  
 
Pertinent items are noted in HPI. Objective:  
Vital Signs: 
Visit Vitals /63 Pulse (!) 103 Temp 97.5 °F (36.4 °C) Resp 17 Ht 5' (1.524 m) Wt 61.7 kg (136 lb 0.4 oz) SpO2 100% BMI 26.57 kg/m² O2 Flow Rate (L/min): 4 l/min O2 Device: Nasal cannula Temp (24hrs), Av.4 °F (36.3 °C), Min:97 °F (36.1 °C), Max:97.7 °F (36.5 °C) Intake/Output:  
 
Intake/Output Summary (Last 24 hours) at 11/3/2020 1217 Last data filed at 11/3/2020 1000 Gross per 24 hour Intake 3778.75 ml Output 1615 ml Net 2163.75 ml Physical Exam: 
 
General:  alert, cooperative, no distress, appears stated age, whispers name only Eye:  conjunctivae/corneas clear. PERRL, EOM's intact. Neurologic:  Oriented to self and place. Following minimal commands Lymphatic:  Cervical, supraclavicular, and axillary nodes normal.  
Neck:  normal and no erythema or exudates noted. Lungs: rhonchi bilateral, left chest tube. Heart:  regular rate and rhythm, S1, S2 normal, no murmur, click, rub or gallop Abdomen: Abnormal findings:  splenomegaly, hypoactive bowel sounds Cardiovascular:  Regular rate and rhythm, S1S2 present, without murmur or extra heart sounds, pedal pulses normal and no edema Skin:  Normal. 
 
LABS AND  DATA: Personally reviewed Recent Labs 20 
[phone] 20 
0430 WBC 19.9* 26.8* HGB 6.9* 8.7* HCT 25.9* 31.6*  
PLT 31* 45* Recent Labs 20 
[phone] 20 
0433  144  
K 3.9 3.8 * 115* CO2 25 23 BUN 26* 22* CREA 0.75 0.66 * 106* CA 7.3* 6.9* No results for input(s): AP, TBIL, TP, ALB, GLOB, AML, LPSE in the last 72 hours. No lab exists for component: SGOT, GPT, AMYP Recent Labs 10/31/20 
184 APTT 34.4* No results for input(s): PHI, PCO2I, PO2I, FIO2I in the last 72 hours. No results for input(s): CPK, CKMB, TROIQ, BNPP in the last 72 hours. Hemodynamics:  
PAP:   CO:    
Wedge:   CI:    
CVP:    SVR: BP 2: 412/83(7725) (10/30/20 1700) PVR:    
 
Ventilator Settings: 
Mode Rate Tidal Volume Pressure FiO2 PEEP Spontaneous   350 ml  5 cm H2O 50 % 5 cm H20 Peak airway pressure: 9.8 cm H2O Minute ventilation: 5.29 l/min MEDS: Reviewed Chest X-Ray: CXR Results  (Last 48 hours) 11/03/20 1119  XR CHEST PORT Final result Impression:  IMPRESSION:  
   
Stable exam. No evidence for pneumothorax Narrative:  history: Chest tube COMPARISON: 11/2/2020 FINDINGS:  
  
Frontal chest radiograph submitted for review. Stable left chest tube. No visualized pneumothorax. Stable left subclavian  
central line. Stable heart size. Unchanged diffuse interstitial lung opacities,  
greater on the left. Probable small bilateral pleural effusions. 11/02/20 0745  XR CHEST PORT Final result Impression:  IMPRESSION:  
   
Left chest tube remains in good position. Small left pleural effusion. No  
pneumothorax. Increased bilateral pulmonary edema versus pneumonia. Narrative:  EXAM: XR CHEST PORT INDICATION: Acute respiratory failure with hypoxia. Recent thoracic surgery. COMPARISON: Portable chest on 10/31/2020 and 10/30/2020. TECHNIQUE: 2 images of semiupright portable chest AP view FINDINGS: Cardiac silhouette is obscured by pulmonary opacities. Left subclavian  
central line is unchanged and in good position. Left chest tube is unchanged  
since 2 days ago and in good position. Cardiac monitoring wires overlie the thorax. The pulmonary vasculature is  
obscured by pulmonary opacities. Bilateral patchy airspace opacities are increased, particularly compared to 3  
days ago.  Small left pleural effusion is unchanged since 2 days ago and  
 decreased since 3 days ago. No pneumothorax. Bones are unchanged. CT Results (most recent): 
Results from Hospital Encounter encounter on 10/30/20 CT ABD PELV WO CONT Narrative CLINICAL HISTORY: Possible neoplasm. INDICATION: ? cancer COMPARISON: 
CONTRAST:  None. TECHNIQUE:  
Thin axial images were obtained through the abdomen and pelvis. Coronal and 
sagittal reformats were generated. Oral contrast was not administered. CT dose 
reduction was achieved through use of a standardized protocol tailored for this 
examination and automatic exposure control for dose modulation. The absence of intravenous contrast material reduces the sensitivity for 
evaluation of visceral organs and vasculature including presence of small mass 
lesions, hemodynamically significant stenoses, dissections, mucosal 
abnormalities etc. 
 
FINDINGS:  
LOWER THORAX: Mediastinal and hilar lymphadenopathy. Large left-sided pleural 
effusion with atelectatic change of the left lung. LIVER/GALLBLADDER: No mass. Gallbladder is within normal limits. CBD is not 
dilated. SPLEEN/PANCREAS:  There is massive splenomegaly. Massive retroperitoneal 
lymphadenopathy. ADRENALS/KIDNEYS: Left kidney is displaced anteriorly and to the left. No 
calculus or hydronephrosis. STOMACH: Effaced. SMALL BOWEL/COLON: Limited evaluation. Extensive fecal stasis. APPENDIX: Not visualized. PERITONEUM: There is minimal ascites present. RETROPERITONEUM: Extensive retroperitoneal adenopathy encases the abdominal 
aorta. REPRODUCTIVE ORGANS: Limited evaluation. URINARY BLADDER: No mass or calculus. BONES: No destructive bone lesion. ADDITIONAL COMMENTS: N/A Impression IMPRESSION: 
There is massive splenomegaly with extensive retroperitoneal adenopathy. Findings are consistent with lymphoproliferative disorder. ECHO:10/30/20 Interpretation Summary Result status: Final result · LV: Estimated LVEF is 45 - 50%. Normal cavity size and wall thickness. Inconclusive left ventricular diastolic function. · LA: Moderately dilated left atrium. · MV: Mild mitral valve regurgitation is present. · TV: Moderate tricuspid valve regurgitation is present. · PA: Moderate pulmonary hypertension. · Pericardium: There is a large left pleural effusion. Multidisciplinary Rounds Completed: Yes ABCDEF Bundle/Checklist Completed: 
Yes SPECIAL EQUIPMENT None DISPOSITION Stay in ICU CRITICAL CARE CONSULTANT NOTE I had a face to face encounter with the patient, reviewed and interpreted patient data including clinical events, labs, images, vital signs, I/O's, and examined patient. I have discussed the case and the plan and management of the patient's care with the consulting services, the bedside nurses and the respiratory therapist.   
 
NOTE OF PERSONAL INVOLVEMENT IN CARE This patient has a high probability of imminent, clinically significant deterioration, which requires the highest level of preparedness to intervene urgently. I participated in the decision-making and personally managed or directed the management of the following life and organ supporting interventions that required my frequent assessment to treat or prevent imminent deterioration. EMR entered and chart reviewed in the course of carrying out educational functions and responsibilities as a Nurse Practitioner student following CESAR Greene RN, BSN Student Nurse Practitioner [employer]

## 2020-11-03 NOTE — PROGRESS NOTES
[place] 
Patient presented to the ED from home with shortness of breath and leg swelling Admit with acute respiratory failure. RUR 14% Disposition: Home with Home health Plan for utilizing home health:    TBD  
 
PCP: First and Last name:  Dr Darrell Hough Name of Practice: Located at [address] Are you a current patient: Yes/No:  
 Approximate date of last visit:  
 Can you participate in a virtual visit with your PCP: No 
                 
Current Advanced Directive/Advance Care Plan: Not on file Patient remains in the ICU s/p  Left video-assisted thoracoscopy. Care manager met with patient's son Aurelia Junior [phone] to introduce self and explain role. Per son patient was independent prior to admission, no history of HH or equipment needs. Son confirmed her insurance to be [employer]. Per son he would like to take patient home with home health. CM explained the purpose of HH therapy vs having an aide to assist with bathing etc which would be an aout of pocket expense. Palliative care is also following. Lucrecia Acharya RN,Care Management Care Management Interventions PCP Verified by CM: Yes(Dr Darrell Hough) MyChart Signup: No 
Discharge Durable Medical Equipment: No 
Physical Therapy Consult: No 
Occupational Therapy Consult: No 
Speech Therapy Consult: Yes Current Support Network: Own Home(Abrahan Junior [phone])

## 2020-11-03 NOTE — PROGRESS NOTES
0000: Bedside shift change report given to Hollie Heart RN (oncoming nurse) by Trudee Skiff (offgoing nurse). Report included the following information SBAR, Intake/Output, MAR and Recent Results. Shift Summary: Patient remains quite drowsy over shift. Minimal verbal responses - received in report and seen in notes that patient very rarely speaks. Patient nods appropriately and [address] off geovanna. RR status stable. 0730: Bedside shift change report given to Merrill Reynolds (oncoming nurse) by Hollie Heart RN (offgoing nurse). Report included the following information SBAR, Intake/Output, MAR and Recent Results.

## 2020-11-03 NOTE — PROGRESS NOTES
[facility] 
 
ICU TEAM Progress Note Name: Frankie Bradley : 1945 MRN: [id number] Date: 11/3/2020 Assessment:  
 
ICU Problems: 1. Acute respiratory failure with hypoxia s/p intubation post op - now extubated 2. Large left pleural effusion s/p VATS with CT placement with Thoracic Surgery. Likely malignancy noted on pleural cytology: Atypical, favor neoplastic process. 3. Symptomatic anemia 2' mild blood loss at CT site 4. Leukocytosis - improving 5. Acute on Chronic Thrombocytopenia s/p platelets likely 2' to [facility] 6. Edema of both lower extremities 7. Massive splenomegaly with retroperitoneal adenopathy on CT abd/pelvis findings concerning for possible lymphoproliferative disorder ICU Comprehensive Plan of Care:  
 
Plans for this Shift:  
1. Daily CBC, BMP 2. Heme/Onc following for malignancy, pleural cytology: Atypical, favor neoplastic process. unknown primary 3. Thoracic surgery following, appreciate input, Chest tube placed to water seal per thoracic. 4. Strict I&Os continue benítez - consider d/c tomorrow. 5. Platelets 31 and Hbg 6.9 will give 1 PRBCs and 1 plts today 6. Give 20 mg Lasix after transfusion. 7. SBP Goal of: > 90 mmHg 8. MAP Goal of: > 65 mmHg 9. Phenylephrine - For above SBP/MAP goals 10. IVFs: d/c 
11. Transfusion Trigger (Hgb): <7 g/dL 12. Respiratory Goals: 
a. Head of bed > 30 degrees 13. Pulmonary toilet: Albuterol  PRN 14. SpO2 Goal: > 92% per vent, wean FiO2 as tolerated 15. Keep K>4; Mg>2 16. PT/OT: PT consulted and on board, OT consulted and on board and Speech therapy consulted and on board 17. Discussed Plan of Care/Code Status: Full Code, Palliative care following, planning to meet with son 2020 18. Appreciate Consultants Input : Thoracic Surgery, Heme/Onc 19. Discussed Care Plan with Bedside RN 
20. Documentation of Current Medications 21. Rest of Plan Below: 
 
F - Feeding:  Yes Dysphagia diet A - Analgesia: Fentanyl PRN 
S - Sedation: Propofol and None T - DVT Prophylaxis: SCD's or Sequential Compression Device H - Head of Bed: > 30 Degrees U - Ulcer Prophylaxis: Pepcid (famotidine) G - Glycemic Control: Insulin SSI 
S - Spontaneous Breathing Trial: N/A 
B - Bowel Regimen: MiraLax I - Indwelling Catheter: 
 Tubes: Chest Tube 28F left Lines: Peripheral IV and Arterial Line Drains: Hodges Catheter D - De-escalation of Antibiotics: Zosyn Subjective:  
Progress Note: 11/3/2020 Reason for ICU Admission: Post VATS  
HPI: 
Cb Dacosta is a 70 y. o. female with past medical history of left breast cancer, s/p radiation, and childhood asthma presented to the ED from home on 10/30/20 with chief complaint of SOB, leg swelling, and weight loss. Symptoms reportedly have been gradual in onset, worsening, now severe, constant, with SOB, LOPEZ, orthopnea. Patient reported no known chronic medical conditions. There were no reports of recent sick contacts or exposure to anyone with COVID 19. Per ED MD report, patient has became increasingly dyspneic, with increased supplemental oxygen requirement. Patient had no known prior lung disease. CTA chest revealed a large left pleural effusion with left lung atelectasis. Labs revealed WBC = 27.2, neutrophils = 2%, and absolute neutrophil count = 0.5.  Hemoglobin = 6.4 (with no comparison lab available for review). ED offered PRBC transfusion but patient reportedly refused initally.  She was started on Levofloxacin 750 mg IV for antibiotic coverage. Was admitted under the hospitalist service. CTS was consulted and plan for VATS later in day. Pulmonary was also consulted as well as Palliative care who spoke with patient and son. Agreed for blood products. She was given 2 units PRBCs and then taken to OR for Left VATS. While in OR patient was intubated for respiratory instability and procedure.  Hemodynamically unstable requiring low dose geovanna and arterial line was placed. Left VATS with pleural biopsis was completed and patient was taken to PACU intubated. Critical care was consulted for post op management. Overnight events: 
Remains on 4L No acute events overnight Pain controlled Chest tube to water seal 
Will receive 1 unit PRBCs today for anemia POD: 
Day of Surgery S/P:  
Procedure(s): LEFT VATS PLEURAL BIOPSIES POSSIBLE TALC PLEURODESIS/ Active Problem List:  
 
Problem List  Date Reviewed: 10/30/2020 Codes Class Leukocytosis ICD-10-CM: Z14.396 ICD-9-CM: 288.60 Neutropenia ([facility] [address] ICD-10-CM: D70.9 ICD-9-CM: 288.00 Anemia ICD-10-CM: D64.9 ICD-9-CM: 285.9 Tachycardia ICD-10-CM: R00.0 ICD-9-CM: 510. 0 [facility] ICD-10-CM: D69.6 ICD-9-CM: 287.5 SIRS (systemic inflammatory response syndrome) (HCC) ICD-10-CM: R65.10 ICD-9-CM: 995.90 Pleural effusion, left ICD-10-CM: J90 ICD-9-CM: 511.9 * (Principal) Acute respiratory failure with hypoxia (HCC) ICD-10-CM: J96.01 
ICD-9-CM: 518.81 Past Medical History:  
 
 has no past medical history on file. Past Surgical History:  
 
 has no past surgical history on file. Home Medications:  
 
Prior to Admission medications Not on File Allergies/Social/Family History: No Known Allergies Social History Tobacco Use  Smoking status: Not on file Substance Use Topics  Alcohol use: Not on file History reviewed. No pertinent family history. Review of Systems:  
 
Review of systems not obtained due to patient factors. Objective:  
Vital Signs: 
Visit Vitals BP (!) 89/49 Pulse 95 Temp 97.7 °F (36.5 °C) Resp 11 Ht 5' (1.524 m) Wt 61.7 kg (136 lb 0.4 oz) SpO2 100% BMI 26.57 kg/m² O2 Flow Rate (L/min): 4 l/min O2 Device: Nasal cannula Temp (24hrs), Av.6 °F (36.4 °C), Min:97.1 °F (36.2 °C), Max:98 °F (36.7 °C) Intake/Output: Intake/Output Summary (Last 24 hours) at 11/3/2020 1735 Last data filed at 11/3/2020 1600 Gross per 24 hour Intake 2791.58 ml Output 2140 ml Net 651.58 ml Physical Exam: 
General:  appears stated age, awake alert, follows simple commands Eye:  conjunctivae/corneas clear. Pupils round and reactive Neurologic:  Awake and alert. CLAY on command Lymphatic:  Cervical, supraclavicular, and axillary nodes normal.  
Neck:  normal and no erythema or exudates noted. Lungs: Clear to auscultation, Left CT to water seal, fluctuations on breaths, serosanguinous drainage. Heart:  regular rate and rhythm, S1, S2 normal, no murmur, click, rub or gallop Abdomen:  soft, mild distension. Hypoactive bowel sounds normal. No masses, no organomegaly Cardiovascular:  Regular rate and rhythm, S1S2 present, without murmur or extra heart sounds, pedal pulses normal and no edema Skin:  Normal. and no rash or abnormalities LABS AND  DATA: Personally reviewed Recent Labs 11/03/20 
[phone] 11/02/20 
0436 WBC 19.9* 26.8* HGB 6.9* 8.7* HCT 25.9* 31.6*  
PLT 31* 45* Recent Labs 11/03/20 
[phone] 11/02/20 
0438  144  
K 3.9 3.8 * 115* CO2 25 23 BUN 26* 22* CREA 0.75 0.66 * 106* CA 7.3* 6.9* No results for input(s): AP, TBIL, TP, ALB, GLOB, AML, LPSE in the last 72 hours. No lab exists for component: SGOT, GPT, AMYP Recent Labs 10/31/20 
1847 APTT 34.4* No results for input(s): PHI, PCO2I, PO2I, FIO2I in the last 72 hours. No results for input(s): CPK, CKMB, TROIQ, BNPP in the last 72 hours. Hemodynamics:  
PAP:   CO:    
Wedge:   CI:    
CVP:    SVR: BP 2: 932/51(9538) (10/30/20 1700) PVR:    
 
Ventilator Settings: 
Mode Rate Tidal Volume Pressure FiO2 PEEP Spontaneous   350 ml  5 cm H2O 50 % 5 cm H20 Peak airway pressure: 9.8 cm H2O Minute ventilation: 5.29 l/min MEDS: Reviewed Chest X-Ray: CXR Results  (Last 48 hours) 11/03/20 1119  XR CHEST PORT Final result Impression:  IMPRESSION:  
   
Stable exam. No evidence for pneumothorax Narrative:  history: Chest tube COMPARISON: 11/2/2020 FINDINGS:  
   
Frontal chest radiograph submitted for review. Stable left chest tube. No visualized pneumothorax. Stable left subclavian  
central line. Stable heart size. Unchanged diffuse interstitial lung opacities,  
greater on the left. Probable small bilateral pleural effusions. 11/02/20 0745  XR CHEST PORT Final result Impression:  IMPRESSION:  
   
Left chest tube remains in good position. Small left pleural effusion. No  
pneumothorax. Increased bilateral pulmonary edema versus pneumonia. Narrative:  EXAM: XR CHEST PORT INDICATION: Acute respiratory failure with hypoxia. Recent thoracic surgery. COMPARISON: Portable chest on 10/31/2020 and 10/30/2020. TECHNIQUE: 2 images of semiupright portable chest AP view FINDINGS: Cardiac silhouette is obscured by pulmonary opacities. Left subclavian  
central line is unchanged and in good position. Left chest tube is unchanged  
since 2 days ago and in good position. Cardiac monitoring wires overlie the thorax. The pulmonary vasculature is  
obscured by pulmonary opacities. Bilateral patchy airspace opacities are increased, particularly compared to 3  
days ago. Small left pleural effusion is unchanged since 2 days ago and  
decreased since 3 days ago. No pneumothorax. Bones are unchanged. CT Results  (Last 48 hours) None ECHO: 10/30/20 Interpretation Summary Result status: Final result · LV: Estimated LVEF is 45 - 50%. Normal cavity size and wall thickness. Inconclusive left ventricular diastolic function. · LA: Moderately dilated left atrium. · MV: Mild mitral valve regurgitation is present. · TV: Moderate tricuspid valve regurgitation is present. · PA: Moderate pulmonary hypertension. · Pericardium: There is a large left pleural effusion. Multidisciplinary Rounds Completed:  Pending ABCDEF Bundle/Checklist Completed: 
Yes SPECIAL EQUIPMENT None DISPOSITION Stay in ICU CRITICAL CARE CONSULTANT NOTE I had a face to face encounter with the patient, reviewed and interpreted patient data including clinical events, labs, images, vital signs, I/O's, and examined patient. I have discussed the case and the plan and management of the patient's care with the consulting services, the bedside nurses and the respiratory therapist.   
 
NOTE OF PERSONAL INVOLVEMENT IN CARE This patient has a high probability of imminent, clinically significant deterioration, which requires the highest level of preparedness to intervene urgently. I participated in the decision-making and personally managed or directed the management of the following life and organ supporting interventions that required my frequent assessment to treat or prevent imminent deterioration. I personally spent 45 minutes of critical care time. This is time spent at this critically ill patient's bedside actively involved in patient care as well as the coordination of care and discussions with the patient's family. This does not include any procedural time which has been billed separately. Teodora Faustin [facility] Critical Care Medicine [employer]

## 2020-11-03 NOTE — WOUND CARE
WOCN Note:  
 
New consult placed for assessment of sacrum. Assessed in room 7105. PPE: face shield, mask and gloves. Chart reviewed. Admitted DX:  Pleural effusion 10.30.2020 S/P Thoracoscopy and Talc pleurodesis Assessment:  
Patient is sleeping, unresponsive and requires assist of 2 with repositioning. Bed:  In touch tamara isolibrium gel Patient has a Hodges. Patient reports no pain. Patient repositioned on left side with pillow. Heels offloaded with pillows. Heels intact without erythema. 1. Sacrum, non blanching purple that may continue to evolve: 5 x 4 x 0 cm. There is dark dyschromia to periwound. Foam dressing in place. Wound, Pressure Prevention & Skin Care Recommendations: 1. Minimize layers of linen/pads under patient to optimize support surface. 2.  Turn/reposition approximately every 2 hours and offload heels. Patient mobility team to assist with q2 turns. 3.  Manage moisture/ Keep skin folds clean and dry. 4.  Specialty bed: Use intouch bed while in ICU; order Prius when transferred out. 5.  Sacrum:  Venelex TID and cover with sacral foam dressing. Discussed above plan with Guru Villegas. Transition of Care: Plan to follow as needed while admitted to hospital. 
 
KATH OcampoN RN [facility] Inpatient Wound Care Available on [employer] Pager [phone] Office [phone]

## 2020-11-04 NOTE — PROGRESS NOTES
Hematology-Oncology Progress Note Joselin Fernandez 
1945 
[id number] 
11/4/2020 Subjective:  
 
Briefly, Ms. Raul Buchanan is a 69 y/o woman admitted with a several week h/o dyspnea, abdominal distension and pedal edema. Ultimately found to have massive enlargement of her spleen, anemia, t'cytopenia and a large left pleural effusion. Seen promptly by CT surgery and underwent a VATS with pleural and diaphragmatic biopsies and talc pleurodesis. Pathology revealed mantle cell lymphoma. Currently patient is awake and alert in the ICU. She is thirsty and asks for ice before we speak further, but aspirated on the ice that was given her earlier today. Allergies: Patient has no known allergies. Current Facility-Administered Medications Medication Dose Route Frequency Provider Last Rate Last Dose  
 0.9% sodium chloride infusion 250 mL  250 mL IntraVENous PRN Berneda Ree, NP-C      
 ferrous sulfate 300 mg (60 mg iron)/5 mL oral syrup 300 mg  300 mg Oral DAILY WITH BREAKFAST Precilla Nancy R, NP-C   300 mg at 11/04/20 8692  balsam peru-castor oiL (VENELEX) ointment   Topical TID Marc Chauhan DO      
 0.9% sodium chloride infusion 250 mL  250 mL IntraVENous PRN Precilla Nancy R, NP-C      
 midodrine (PROAMATINE) tablet 5 mg  5 mg Oral TID WITH MEALS Alisia Madrigal NP   Stopped at 11/04/20 4169  fentaNYL citrate (PF) injection 25-50 mcg  25-50 mcg IntraVENous Q2H PRN Zackeryeda Ree, NP-C   50 mcg at 11/02/20 2026  ondansetron (ZOFRAN) injection 4 mg  4 mg IntraVENous Q6H PRN Precilla Nancy R, NP-C      
 white petrolatum-mineral oiL (AKWA TEARS) 83-15 % ophthalmic ointment   Both Eyes Q12H Zackeryeda Ree, NP-C  piperacillin-tazobactam (ZOSYN) 3.375 g in 0.9% sodium chloride (MBP/ADV) 100 mL  3.375 g IntraVENous Q8H Precilla Nancy R, NP-C 25 mL/hr at 11/04/20 1203 3.375 g at 11/04/20 1203  sodium chloride (NS) flush 5-40 mL  5-40 mL IntraVENous Q8H Angelo, Yoav Antony MD   10 mL at 11/04/20 0019  
 sodium chloride (NS) flush 5-40 mL  5-40 mL IntraVENous PRN Chiquita Cervantes MD   20 mL at 11/02/20 0434  acetaminophen (TYLENOL) tablet 650 mg  650 mg Oral Q6H PRN Chiquita Cervantes MD      
 Or  
 acetaminophen (TYLENOL) suppository 650 mg  650 mg Rectal Q6H PRN Yoav Stephens MD      
 polyethylene glycol (MIRALAX) packet 17 g  17 g Oral DAILY PRN Chiquita Cervantes MD      
 
Objective:  
 
Patient Vitals for the past 24 hrs: 
 BP Temp Pulse Resp SpO2  
11/04/20 1600 (!) 120/99  (!) 104 25 93 % 11/04/20 1515   (!) 104 25 90 % 11/04/20 1500 108/68  (!) 103 25 91 % 11/04/20 1430   (!) 106 23 100 % 11/04/20 1415   (!) 107 22 100 % 11/04/20 1400 (!) 101/55  (!) 106 21 100 % 11/04/20 1345   100 13 100 % 11/04/20 1330   100 29 100 % 11/04/20 1315   (!) 101 18 100 % 11/04/20 1300 103/67  (!) 108 21 100 % 11/04/20 1200 101/61 98.3 °F (36.8 °C) (!) 106 17 100 % 11/04/20 1145   (!) 112 14 98 % 11/04/20 1130   (!) 106 22 100 % 11/04/20 1115   (!) 107 21 100 % 11/04/20 1100 (!) 96/57  (!) 104 19 100 % 11/04/20 1000 101/61  (!) 109 21 100 % 11/04/20 0930   (!) 109 16 100 % 11/04/20 0900 (!) 97/54  (!) 102 15 100 % 11/04/20 0800 (!) 100/55 99.1 °F (37.3 °C) (!) 112 19 100 % 11/04/20 0700 108/63  (!) 115 21 100 % 11/04/20 0600 (!) 105/58  (!) 110 17 100 % 11/04/20 0500 105/61  (!) 114 18 100 % 11/04/20 0400 (!) 103/59 98.2 °F (36.8 °C) (!) 106 17 100 % 11/04/20 0300 (!) 92/46  (!) 106 19 100 % 11/04/20 0200 (!) 82/49  (!) 105 14 100 % 11/04/20 0100 (!) 106/55  (!) 104 21 100 % 11/04/20 0000 108/60 98.3 °F (36.8 °C) (!) 104 19 100 % 11/03/20 2345 103/61 98.3 °F (36.8 °C) (!) 102 (!) 33 100 % 11/03/20 2330 (!) 106/51  (!) 103 17 100 % 11/03/20 2326 106/62 98.3 °F (36.8 °C) (!) 104 18 100 % 11/03/20 2300 (!) 100/58  (!) 107 18 100 % 11/03/20 2200 113/64  (!) 105 20 100 % 11/03/20 2100 (!) 104/53  97 15 100 % 11/03/20 2000 (!) 113/52 97.7 °F (36.5 °C) 95 15 100 % 11/03/20 1800 (!) 96/56 97.5 °F (36.4 °C) 96 12 100 % 11/03/20 1745 (!) 100/59 98 °F (36.7 °C) 100 14 100 % 11/03/20 1730 (!) 98/52 97.5 °F (36.4 °C) 96 12 100 % Gen: NAD HEENT: PERRL, Sclerae anicteric Cv: RRR without m/r/g Pulm: CTA bilaterally Abd: NABS, NTND, No HSM Ext: No c/c/e Available labs reviewed: 
Labs:   
Recent Results (from the past 24 hour(s)) PLATELETS, ALLOCATE Collection Time: 11/03/20  6:15 PM  
Result Value Ref Range Unit number [id number] Blood component type PLP,LR PASC2 Unit division 00 Status of unit TRANSFUSED METABOLIC PANEL, BASIC Collection Time: 11/04/20  4:04 AM  
Result Value Ref Range Sodium 144 136 - 145 mmol/L Potassium 3.9 3.5 - 5.1 mmol/L Chloride 113 (H) 97 - 108 mmol/L  
 CO2 27 21 - 32 mmol/L Anion gap 4 (L) 5 - 15 mmol/L Glucose 84 65 - 100 mg/dL BUN 29 (H) 6 - 20 MG/DL Creatinine 0.72 0.55 - 1.02 MG/DL  
 BUN/Creatinine ratio 40 (H) 12 - 20 GFR est AA >60 >60 ml/min/1.73m2 GFR est non-AA >60 >60 ml/min/1.73m2 Calcium 7.5 (L) 8.5 - 10.1 MG/DL  
CBC WITH AUTOMATED DIFF Collection Time: 11/04/20  4:04 AM  
Result Value Ref Range WBC 20.7 (H) 3.6 - 11.0 K/uL  
 RBC 3.42 (L) 3.80 - 5.20 M/uL HGB 8.5 (L) 11.5 - 16.0 g/dL HCT 30.1 (L) 35.0 - 47.0 % MCV 88.0 80.0 - 99.0 FL  
 MCH 24.9 (L) 26.0 - 34.0 PG  
 MCHC 28.2 (L) 30.0 - 36.5 g/dL  
 RDW 19.2 (H) 11.5 - 14.5 % PLATELET 33 (LL) 077 - 400 K/uL MPV 9.6 8.9 - 12.9 FL  
 NRBC 0.6 (H) 0  WBC ABSOLUTE NRBC 0.13 (H) 0.00 - 0.01 K/uL NEUTROPHILS 30 (L) 32 - 75 % BAND NEUTROPHILS 1 0 - 6 % LYMPHOCYTES 67 (H) 12 - 49 % MONOCYTES 1 (L) 5 - 13 % EOSINOPHILS 0 0 - 7 % BASOPHILS 0 0 - 1 % METAMYELOCYTES 1 (H) 0 % IMMATURE GRANULOCYTES 0 %  
 ABS. NEUTROPHILS 6.4 1.8 - 8.0 K/UL ABS. LYMPHOCYTES 13.9 (H) 0.8 - 3.5 K/UL  
 ABS. MONOCYTES 0.2 0.0 - 1.0 K/UL  
 ABS. EOSINOPHILS 0.0 0.0 - 0.4 K/UL  
 ABS. BASOPHILS 0.0 0.0 - 0.1 K/UL  
 ABS. IMM. GRANS. 0.0 K/UL  
 DF MANUAL    
 RBC COMMENTS OVALOCYTES PRESENT 
    
 RBC COMMENTS POLYCHROMASIA 1+ 
    
 RBC COMMENTS ANISOCYTOSIS 1+ 
    
 RBC COMMENTS HYPOCHROMIA 1+ WBC COMMENTS SMUDGE CELLS SEEN    
MAGNESIUM Collection Time: 11/04/20  4:04 AM  
Result Value Ref Range Magnesium 1.9 1.6 - 2.4 mg/dL PHOSPHORUS Collection Time: 11/04/20  4:04 AM  
Result Value Ref Range Phosphorus 3.0 2.6 - 4.7 MG/DL Assessment and Plan  
 
71 y/o woman with stage IV mantle cell lymphoma. 1. MCL: I have spoken with her and her son and recommended rituximab/bendamustine chemotherapy to start tomorrow. In the BRIGHT (Blood [id number]) [id number]) trial, the response rate was 97% (not cure, but response). Son is interested, she is undecided. Will ask pharmacy to put in orders and will check uric acid, hepatitis serologies and revisit tomorrow. Please note that neither rituximab nor bendamustine require a port or echo. If she declines treatment, would recommend hospice care given this appears to be a rapidly progressive mantle cell. Case d/w Kevin DillonD who explained the chemo may not be ready until Friday. Thank you for allowing us to participate in the care of this very pleasant patient. Giuliana López MD 
Hematology/Oncology Phone [phone]

## 2020-11-04 NOTE — PROGRESS NOTES
1930:  Bedside and Verbal shift change report given to Tito (oncoming nurse) by Mary Jorge RN (offgoing nurse). Report included the following information SBAR, Kardex, ED Summary, OR Summary, Procedure Summary, Intake/Output, MAR, Accordion, Recent Results, Med Rec Status, Cardiac Rhythm sinus rhythm and Dual Neuro Assessment. Assumed care of patient. Patient resting quietly with son at bedside. Son assisting patient with suctioning sputum from mouth. Blood transfusing via central line. Patient A&O x3, CLAY, FC. Patient weak x4, speaks softly. Breath sounds congested, with mildly productive cough. Hodges catheter draining yellow/hazy urine. Chest tube intact and draining. No complaints of pain at this time. 2330:  Platelet transfusion started. 0015:  Platelet transfusion completed. Patient wide awake in bed. States she is not sleepy at this time. Does not complain of pain or the need to be repositioned at this time. 0730: Bedside and Verbal shift change report given to Hipolito Gillis (oncoming nurse) by Jamilah Alejandro RN (offgoing nurse). Report included the following information SBAR, Kardex, Procedure Summary, Intake/Output, MAR, Accordion, Recent Results, Med Rec Status, Cardiac Rhythm sinus tachycardia and Dual Neuro Assessment.

## 2020-11-04 NOTE — ROUTINE PROCESS
Bedside, Verbal and Written shift change report given to [facility] Iris Carrillo (oncoming nurse) by Felipe Jack (offgoing nurse). Report included the following information SBAR, Kardex, ED Summary, Procedure Summary, Intake/Output, MAR, Accordion and Recent Results. 1730. Patient refusing NG tube - Dellyn NP at the bedside- will do bedside swallow and allow the patient to have ice chips - no ngt at this time.

## 2020-11-04 NOTE — PROGRESS NOTES
NUTRITION Chart reviewed for brief follow-up; discussed during MDR and with SLP. Diet changed to NPO this morning. Plan is to place NGT for medications and feeding. Recommended tube feeding: Osmolite 1.5 @ 45 ml/hr with 130 ml water flush q 4 hr. This will provide 1080 ml, 1620 calories, 68 gm protein and 1600 ml free water (tube feeding/flush) per day to meet estimated needs. RD to follow. Estimated Nutrition Needs:  
Energy: 1636 kcal/d (30 kcal/kg of admit wt)  Wt used: Admission(120 lbs) Protein: 65 - 76g (1.2 -1.4g/kg)  Wt used: Admission(120 lbs (54.5 kg)) Fluid: 1mL/kcal  
 
 
Ruthy Whitney RD CNSC Contact: [employer] 
 
 
'

## 2020-11-04 NOTE — PROGRESS NOTES
Problem: Dysphagia (Adult) Goal: *Acute Goals and Plan of Care (Insert Text) Description: Speech Therapy Goals Initiated 11/2/2020 1. Patient will tolerate pureed diet/thin liquids without adverse effects within 7 days. 2. Patient will resume baseline diet without adverse effects within 7 days. Outcome: Not Met SPEECH LANGUAGE PATHOLOGY DYSPHAGIA TREATMENT Patient: Jessica Goff (23 y.o. female) Date: 11/4/2020 Diagnosis: Pleural effusion, left [J90] Acute respiratory failure with hypoxia ([address] [J96.01] Tachycardia [R00.0] Leukocytosis [D72.829] SIRS (systemic inflammatory response syndrome) (HCC) [R65.10] Anemia [D64.9] Neutropenia ([address] [D70.9] Thrombocytopenia ([address] [D69.6] Acute respiratory failure with hypoxia ([address] Procedure(s) (LRB): LEFT VATS PLEURAL BIOPSIES POSSIBLE TALC PLEURODESIS/ (Left) 5 Days Post-Op Precautions: swallow ASSESSMENT: 
Patient with poor alertness requiring cues to stay awake. Patient also weak with poor ability to hold her head in neutral position. Patient with decline in swallow function, now with moderate oral and severe pharyngeal dysphagia characterized by slow oral prep, delayed posterior propulsion, suspected delayed swallow initiation and reduced laryngeal elevation, and multiple swallows per bolus (3 with ice chips, 5 with puree) followed by weak coughing. Patient is at high risk for aspiration secondary to poor alertness, general debility, and oropharyngeal dysphagia s/p recent intubation. PLAN: 
Recommendations and Planned Interventions: 
-NPO, including no oral meds. Consider non-oral route for nutrition, hydration, and meds if in line with goals of care 
-SLP to follow to re-evaluate swallowing as alertness improves Patient continues to benefit from skilled intervention to address the above impairments. Continue treatment per established plan of care. Discharge Recommendations: To Be Determined SUBJECTIVE:  
 Patient stated her name. OBJECTIVE:  
Cognitive and Communication Status: 
Neurologic State: Drowsy Orientation Level: Oriented to person Cognition: Decreased command following, Decreased attention/concentration Perception: Appears intact Perseveration: No perseveration noted Safety/Judgement: Not assessed Dysphagia Treatment: P.O. Trials: 
Patient Position: upright in bed, however required cues to hold her head up Vocal quality prior to P.O.: Low volume Consistency Presented: Ice chips;Puree How Presented: SLP-fed/presented;Spoon Bolus Acceptance: No impairment Bolus Formation/Control: Impaired Type of Impairment: Delayed Propulsion: Delayed (# of seconds) Initiation of Swallow: Delayed (# of seconds) Laryngeal Elevation: Decreased Aspiration Signs/Symptoms: Weak cough Pharyngeal Phase Characteristics: Multiple swallows; Suspected pharyngeal residue; Other (comment)(5 swallows with single puree bolus) Effective Modifications: None Cues for Modifications: None Pain: 
Pain Scale 1: Numeric (0 - 10) Pain Intensity 1: 0 After treatment:  
Patient left in no apparent distress in bed, Call bell within reach, and Nursing notified COMMUNICATION/EDUCATION:  
The patient's plan of care including recommendations, planned interventions, and recommended diet changes were discussed with: Registered nurse and SEBAS Gloria SLP Time Calculation: 10 mins

## 2020-11-04 NOTE — PROGRESS NOTES
Thoracic Surgery Simple Progress Note Admit Date: 10/30/2020 POD: 5 Days Post-Op Procedure:  Procedure(s): LEFT VATS PLEURAL BIOPSIES POSSIBLE TALC PLEURODESIS/ 
 
 
Subjective:  
 
Patient has complaints: No significant medical complaints Review of Systems: 
CARDIAC: negative RESP: positive for respiratory failure and left pleural effusion NEURO:  negative INCISION: Clean, dry, and intact EXT: Denies new swelling or pain in the legs or calves Objective:  
 
Blood pressure (!) 100/55, pulse (!) 112, temperature 99.1 °F (37.3 °C), resp. rate 19, height 5' (1.524 m), weight 136 lb 0.4 oz (61.7 kg), SpO2 100 %. Temp (24hrs), Av °F (36.7 °C), Min:97.5 °F (36.4 °C), Max:99.1 °F (37.3 °C) Hemodynamics PAP 
  CO 
  CI 
 
 07 - 1900 In: -  
Out: 180 [Urine:10] 
1901 -  07 In: 4186.8 [I.V.:3359.3] Out: 2720 [[facility]:4274] EXAM: 
GENERAL: VSS, afrible, alert and cooperative HEART:  regular rate and rhythm, hemodymaticly stable, remains off pressors LUNG: course breath sounds, remains on NC O2 but down to 2 L/min O2 sat 100% NEURO:  speech normal, alert and oriented x iii, has not slept all night INCISION: Clean, dry, and intact EXTREMITIES:No evidence of DVT seen on physical exam. 
GI/: Abd soft, nonterder with + bowel sounds. Hodges in place Labs: 
Recent Results (from the past 24 hour(s)) TYPE & SCREEN Collection Time: 20  1:51 PM  
Result Value Ref Range Crossmatch Expiration 2020,2359 ABO/Rh(D) A POSITIVE Antibody screen NEG Unit number [id number] Blood component type RC LR Unit division 00 Status of unit TRANSFUSED Crossmatch result Compatible RBC, ALLOCATE Collection Time: 20  1:55 PM  
Result Value Ref Range HISTORY CHECKED? Historical check performed PLATELETS, ALLOCATE Collection Time: 20  6:15 PM  
Result Value Ref Range Unit number [id number] Blood component type PLP,LR PASC2 Unit division 00 Status of unit TRANSFUSED METABOLIC PANEL, BASIC Collection Time: 11/04/20  4:04 AM  
Result Value Ref Range Sodium 144 136 - 145 mmol/L Potassium 3.9 3.5 - 5.1 mmol/L Chloride 113 (H) 97 - 108 mmol/L  
 CO2 27 21 - 32 mmol/L Anion gap 4 (L) 5 - 15 mmol/L Glucose 84 65 - 100 mg/dL BUN 29 (H) 6 - 20 MG/DL Creatinine 0.72 0.55 - 1.02 MG/DL  
 BUN/Creatinine ratio 40 (H) 12 - 20 GFR est AA >60 >60 ml/min/1.73m2 GFR est non-AA >60 >60 ml/min/1.73m2 Calcium 7.5 (L) 8.5 - 10.1 MG/DL  
CBC WITH AUTOMATED DIFF Collection Time: 11/04/20  4:04 AM  
Result Value Ref Range WBC 20.7 (H) 3.6 - 11.0 K/uL  
 RBC 3.42 (L) 3.80 - 5.20 M/uL HGB 8.5 (L) 11.5 - 16.0 g/dL HCT 30.1 (L) 35.0 - 47.0 % MCV 88.0 80.0 - 99.0 FL  
 MCH 24.9 (L) 26.0 - 34.0 PG  
 MCHC 28.2 (L) 30.0 - 36.5 g/dL  
 RDW 19.2 (H) 11.5 - 14.5 % PLATELET 33 (LL) 469 - 400 K/uL MPV 9.6 8.9 - 12.9 FL  
 NRBC 0.6 (H) 0  WBC ABSOLUTE NRBC 0.13 (H) 0.00 - 0.01 K/uL NEUTROPHILS 30 (L) 32 - 75 % BAND NEUTROPHILS 1 0 - 6 % LYMPHOCYTES 67 (H) 12 - 49 % MONOCYTES 1 (L) 5 - 13 % EOSINOPHILS 0 0 - 7 % BASOPHILS 0 0 - 1 % METAMYELOCYTES 1 (H) 0 % IMMATURE GRANULOCYTES 0 %  
 ABS. NEUTROPHILS 6.4 1.8 - 8.0 K/UL  
 ABS. LYMPHOCYTES 13.9 (H) 0.8 - 3.5 K/UL  
 ABS. MONOCYTES 0.2 0.0 - 1.0 K/UL  
 ABS. EOSINOPHILS 0.0 0.0 - 0.4 K/UL  
 ABS. BASOPHILS 0.0 0.0 - 0.1 K/UL  
 ABS. IMM. GRANS. 0.0 K/UL  
 DF MANUAL    
 RBC COMMENTS OVALOCYTES PRESENT 
    
 RBC COMMENTS POLYCHROMASIA 1+ 
    
 RBC COMMENTS ANISOCYTOSIS 1+ 
    
 RBC COMMENTS HYPOCHROMIA 1+ WBC COMMENTS SMUDGE CELLS SEEN    
MAGNESIUM Collection Time: 11/04/20  4:04 AM  
Result Value Ref Range Magnesium 1.9 1.6 - 2.4 mg/dL PHOSPHORUS Collection Time: 11/04/20  4:04 AM  
Result Value Ref Range Phosphorus 3.0 2.6 - 4.7 MG/DL Assessment: No evidence of DVT. Principal Problem: 
  Acute respiratory failure with hypoxia ([address] (10/30/2020) Active Problems: 
  Leukocytosis (10/30/2020) Neutropenia ([address] (10/30/2020) Anemia (10/30/2020) Tachycardia (10/30/2020) Thrombocytopenia ([address] (10/30/2020) SIRS (systemic inflammatory response syndrome) ([address] (10/30/2020) Pleural effusion, left (10/30/2020) PATH: Awaiting Flow studies Plan/Recommendations:  
Leave chest tube in place If she continues to have large amounts of output, she may need placement of pleurX for long term management See orders Signed By: Stefan CLINTON

## 2020-11-04 NOTE — PROGRESS NOTES
[facility]  Hospitalist Group ICU Transfer/Accept Summary [place] [address] [place] 
DATE OF TRANSFER: 11/4/2020 PATIENT ID: Al Lee MRN: [id number] YOB: 1945 PRIMARY CARE PROVIDER: Ronel, MD Nidia  
DATE OF ADMISSION: 10/30/2020 12:17 AM   
ATTENDING PHYSICIAN: Pam Cisneros MD 
CONSULTATIONS:  
IP CONSULT TO PULMONOLOGY 
IP CONSULT TO THORACIC SURGERY 
IP CONSULT TO PALLIATIVE CARE - PROVIDER 
IP CONSULT TO HEMATOLOGY PROCEDURES/SURGERIES:  
Procedure(s): LEFT VATS PLEURAL BIOPSIES POSSIBLE TALC PLEURODESIS/ 
 
REASON FOR ADMISSION: Acute respiratory failure with hypoxia ([facility]) HOSPITAL PROBLEM LIST: 
Patient Active Problem List  
Diagnosis Code  Leukocytosis D72.829  
 Neutropenia ([facility]) D70.9  Anemia D64.9  Tachycardia R00.0  Thrombocytopenia ([address] D69.6  SIRS (systemic inflammatory response syndrome) ([facility]) R65.10  Pleural effusion, left J90  
 Acute respiratory failure with hypoxia ([facility]) J96.01 Brief HPI and Hospital Course:   
 
Case discussed with ICU team by this writer Assuming care;  Recent and current issues delineated below:  
Assessment:  
  
ICU Problems: 
1. S/P Acute respiratory failure with hypoxia s/p intubation post op - now extubated 2. Large left pleural effusion s/p VATS with CT placement with Thoracic Surgery. Likely malignancy noted on pleural cytology: Atypical, favor neoplastic process. 3. Symptomatic anemia 2' mild blood loss at CT site -post blood transfusion 4. Leukocytosis - improving 5. Acute on Chronic Thrombocytopenia s/p platelets likely 2' to [facility] 6. Edema of both lower extremities 7. Massive splenomegaly with retroperitoneal adenopathy on CT abd/pelvis findings concerning for possible lymphoproliferative disorder 
  
ICU Comprehensive Plan of Care:  
  
Plans for this Shift:  
1.  Daily CBC, BMP 
 2. Heme/Onc following for malignancy, pleural cytology: Atypical, favor neoplastic process. unknown primary 3. Thoracic surgery following, appreciate input, Chest tube placed to water seal per thoracic. 4. Strict I&Os continue benítez - consider d/c tomorrow. 5. Platelets 3 33 and hemoglobin 8.5 this morning. White count 20.7. We will hold off on transfusing platelets and continue to trend H&H. 
6. SBP Goal of: > 90 mmHg 7. MAP Goal of: > 65 mmHg 8. None - For above SBP/MAP goals 9. Transfusion Trigger (Hgb): <7 g/dL 10. Respiratory Goals: 
a. Head of bed > 30 degrees 11. Pulmonary toilet: Albuterol  PRN 12. SpO2 Goal: > 92% per vent, wean FiO2 as tolerated 13. Keep K>4; Mg>2  
14. PT/OT: PT consulted and on board, OT consulted and on board and Speech therapy consulted and on board 15. Discussed Plan of Care/Code Status: Full Code, Palliative care following, planning to meet with son 11/2/2020 12. Appreciate Consultants Input : Thoracic Surgery, Heme/Onc 17. Discussed Care Plan with Bedside RN 
18. Documentation of Current Medications 19. Rest of Plan Below: 
  
F - Feeding:  Yes NG tube feeds -speech is  following A - Analgesia: Fentanyl PRN 
S - Sedation: Propofol and None T - DVT Prophylaxis: SCD's or Sequential Compression Device H - Head of Bed: > 30 Degrees U - Ulcer Prophylaxis: Pepcid (famotidine) G - Glycemic Control: Insulin SSI 
S - Spontaneous Breathing Trial: N/A 
B - Bowel Regimen: MiraLax I - Indwelling Catheter: 
            Tubes: Chest Tube 28F left Lines: Peripheral IV Drains: Benítez Catheter D - De-escalation of Antibiotics: Zosyn Trans to Intermediate level care, non covid bed Labs:  
 
Recent Labs 11/04/20 
0404 11/03/20 
3775 WBC 20.7* 19.9* HGB 8.5* 6.9*  
HCT 30.1* 25.9*  
PLT 33* 31* Recent Labs 11/04/20 
0404 11/03/20 
0359 11/02/20 
3245  143 144  
K 3.9 3.9 3.8 * 114* 115* CO2 27 25 23 BUN 29* 26* 22* CREA 0.72 0.75 0.66 GLU 84 136* 106* CA 7.5* 7.3* 6.9*  
MG 1.9  --   --   
PHOS 3.0  --   -- No results for input(s): ALT, AP, TBIL, TBILI, TP, ALB, GLOB, GGT, AML, LPSE in the last 72 hours. No lab exists for component: SGOT, GPT, AMYP, HLPSE No results for input(s): INR, PTP, APTT, INREXT in the last 72 hours. No results for input(s): FE, TIBC, PSAT, FERR in the last 72 hours. Lab Results Component Value Date/Time Folate 15.5 10/31/2020 06:47 PM  
  
No results for input(s): PH, PCO2, PO2 in the last 72 hours. No results for input(s): CPK, CKNDX, TROIQ in the last 72 hours. No lab exists for component: CPKMB No results found for: CHOL, CHOLX, CHLST, CHOLV, HDL, HDLP, LDL, LDLC, DLDLP, TGLX, TRIGL, TRIGP, CHHD, CHHDX Lab Results Component Value Date/Time Glucose (POC) 102 (H) 10/31/2020 06:54 PM  
 
No results found for: COLOR, APPRN, SPGRU, REFSG, TONNY, PROTU, GLUCU, KETU, BILU, UROU, ALEX, LEUKU, GLUKE, EPSU, BACTU, WBCU, RBCU, CASTS, UCRY 
 
 
CODE STATUS: 
x Full Code DNR Partial  
 Comfort Care Signed:  
Vanesa Damon MD 
Date of Service:  11/4/2020 
6:22 PM

## 2020-11-04 NOTE — PROGRESS NOTES
[facility] 
 
ICU TEAM Progress Note Name: Vanesa Oliver : 1945 MRN: [id number] Date: 2020 Assessment:  
 
ICU Problems: 
1. S/P Acute respiratory failure with hypoxia s/p intubation post op - now extubated 2. Large left pleural effusion s/p VATS with CT placement with Thoracic Surgery. Likely malignancy noted on pleural cytology: Atypical, favor neoplastic process. 3. Symptomatic anemia 2' mild blood loss at CT site -post blood transfusion 4. Leukocytosis - improving 5. Acute on Chronic Thrombocytopenia s/p platelets likely 2' to [facility] 6. Edema of both lower extremities 7. Massive splenomegaly with retroperitoneal adenopathy on CT abd/pelvis findings concerning for possible lymphoproliferative disorder ICU Comprehensive Plan of Care:  
 
Plans for this Shift:  
1. Daily CBC, BMP 2. Heme/Onc following for malignancy, pleural cytology: Atypical, favor neoplastic process. unknown primary 3. Thoracic surgery following, appreciate input, Chest tube placed to water seal per thoracic. 4. Strict I&Os continue benítez - consider d/c tomorrow. 5. Platelets 3 33 and hemoglobin 8.5 this morning. White count 20.7. We will hold off on transfusing platelets and continue to trend H&H. 
6. SBP Goal of: > 90 mmHg 7. MAP Goal of: > 65 mmHg 8. None - For above SBP/MAP goals 9. Transfusion Trigger (Hgb): <7 g/dL 10. Respiratory Goals: 
a. Head of bed > 30 degrees 11. Pulmonary toilet: Albuterol  PRN 12. SpO2 Goal: > 92% per vent, wean FiO2 as tolerated 13. Keep K>4; Mg>2  
14. PT/OT: PT consulted and on board, OT consulted and on board and Speech therapy consulted and on board 15. Discussed Plan of Care/Code Status: Full Code, Palliative care following, planning to meet with son 2020 12. Appreciate Consultants Input : Thoracic Surgery, Heme/Onc 17. Discussed Care Plan with Bedside RN 
18. Documentation of Current Medications 19. Rest of Plan Below: F - Feeding:  Yes NG tube feeds -speech is  following A - Analgesia: Fentanyl PRN 
S - Sedation: Propofol and None T - DVT Prophylaxis: SCD's or Sequential Compression Device H - Head of Bed: > 30 Degrees U - Ulcer Prophylaxis: Pepcid (famotidine) G - Glycemic Control: Insulin SSI 
S - Spontaneous Breathing Trial: N/A 
B - Bowel Regimen: MiraLax I - Indwelling Catheter: 
 Tubes: Chest Tube 28F left Lines: Peripheral IV Drains: Hodges Catheter D - De-escalation of Antibiotics: Zosyn Subjective:  
Progress Note: 11/4/2020 Reason for ICU Admission: Post VATS  
HPI: 
Lazaro Dacosta is a 70 y. o. female with past medical history of left breast cancer, s/p radiation, and childhood asthma presented to the ED from home on 10/30/20 with chief complaint of SOB, leg swelling, and weight loss. Symptoms reportedly have been gradual in onset, worsening, now severe, constant, with SOB, LOPEZ, orthopnea. Patient reported no known chronic medical conditions. There were no reports of recent sick contacts or exposure to anyone with COVID 19. Per ED MD report, patient has became increasingly dyspneic, with increased supplemental oxygen requirement. Patient had no known prior lung disease. CTA chest revealed a large left pleural effusion with left lung atelectasis. Labs revealed WBC = 27.2, neutrophils = 2%, and absolute neutrophil count = 0.5.  Hemoglobin = 6.4 (with no comparison lab available for review). ED offered PRBC transfusion but patient reportedly refused initally.  She was started on Levofloxacin 750 mg IV for antibiotic coverage. Was admitted under the hospitalist service. CTS was consulted and plan for VATS later in day. Pulmonary was also consulted as well as Palliative care who spoke with patient and son. Agreed for blood products. She was given 2 units PRBCs and then taken to OR for Left VATS.  While in OR patient was intubated for respiratory instability and procedure. Hemodynamically unstable requiring low dose geovanna and arterial line was placed. Left VATS with pleural biopsis was completed and patient was taken to PACU intubated. Critical care was consulted for post op management. Overnight events:  
Remains on nasal cannula No acute events overnight Pain controlled Chest tube to water seal 
Transfer to floor,  Thoracics notified Heme-onc following. Palliative care following. POD: 
#2 
 
S/P:  
Procedure(s): LEFT VATS PLEURAL BIOPSIES POSSIBLE TALC PLEURODESIS/ Active Problem List:  
 
Problem List  Date Reviewed: 10/30/2020 Codes Class Leukocytosis ICD-10-CM: V59.537 ICD-9-CM: 288.60 Neutropenia ([facility] ICD-10-CM: D70.9 ICD-9-CM: 288.00 Anemia ICD-10-CM: D64.9 ICD-9-CM: 285.9 Tachycardia ICD-10-CM: R00.0 ICD-9-CM: 968. 0 Thrombocytopenia ([facility] ICD-10-CM: D69.6 ICD-9-CM: 287.5 SIRS (systemic inflammatory response syndrome) (HCC) ICD-10-CM: R65.10 ICD-9-CM: 995.90 Pleural effusion, left ICD-10-CM: J90 ICD-9-CM: 511.9 * (Principal) Acute respiratory failure with hypoxia (HCC) ICD-10-CM: J96.01 
ICD-9-CM: 518.81 Past Medical History:  
 
 has no past medical history on file. Past Surgical History:  
 
 has no past surgical history on file. Home Medications:  
 
Prior to Admission medications Not on File Allergies/Social/Family History: No Known Allergies Social History Tobacco Use  Smoking status: Not on file Substance Use Topics  Alcohol use: Not on file History reviewed. No pertinent family history. Review of Systems:  
 
Review of systems not obtained due to patient factors. Objective:  
Vital Signs: 
Visit Vitals BP (!) 120/99 (BP 1 Location: Right arm, BP Patient Position: At rest) Pulse (!) 104 Temp 98.3 °F (36.8 °C) Resp 25 Ht 5' (1.524 m) Wt 61.7 kg (136 lb 0.4 oz) SpO2 93% BMI 26.57 kg/m² O2 Flow Rate (L/min): 2 l/min O2 Device: Nasal cannula Temp (24hrs), Av.1 °F (36.7 °C), Min:97.5 °F (36.4 °C), Max:99.1 °F (37.3 °C) Intake/Output:  
 
Intake/Output Summary (Last 24 hours) at 2020 1717 Last data filed at 2020 1438 Gross per 24 hour Intake 1627.5 ml Output 2035 ml Net -407.5 ml Physical Exam: 
General:  appears stated age, awake alert, follows simple commands Eye:  conjunctivae/corneas clear. Pupils round and reactive Neurologic:  Awake and alert. CLAY on command Lymphatic:  Cervical, supraclavicular, and axillary nodes normal.  
Neck:  normal and no erythema or exudates noted. Lungs: Clear to auscultation, Left CT to water seal, fluctuations on breaths, serosanguinous drainage. Heart:  regular rate and rhythm, S1, S2 normal, no murmur, click, rub or gallop Abdomen:  soft, mild distension. Hypoactive bowel sounds normal. No masses, no organomegaly Cardiovascular:  Regular rate and rhythm, S1S2 present, without murmur or extra heart sounds, pedal pulses normal and no edema Skin:  Normal. and no rash or abnormalities LABS AND  DATA: Personally reviewed Recent Labs 20 
0404 20 
9825 WBC 20.7* 19.9* HGB 8.5* 6.9*  
HCT 30.1* 25.9*  
PLT 33* 31* Recent Labs 20 
0404 20 
5114  143  
K 3.9 3.9 * 114* CO2 27 25 BUN 29* 26* CREA 0.72 0.75 GLU 84 136* CA 7.5* 7.3*  
MG 1.9  --   
PHOS 3.0  -- No results for input(s): AP, TBIL, TP, ALB, GLOB, AML, LPSE in the last 72 hours. No lab exists for component: SGOT, GPT, AMYP No results for input(s): INR, PTP, APTT, INREXT, INREXT in the last 72 hours. No results for input(s): PHI, PCO2I, PO2I, FIO2I in the last 72 hours. No results for input(s): CPK, CKMB, TROIQ, BNPP in the last 72 hours. Hemodynamics:  
PAP:   CO:    
Wedge:   CI:    
CVP:    SVR: BP 2: 443/34(8472) (10/30/20 1700) PVR:    
 
Ventilator Settings: Mode Rate Tidal Volume Pressure FiO2 PEEP Spontaneous   350 ml  5 cm H2O 50 % 5 cm H20 Peak airway pressure: 9.8 cm H2O Minute ventilation: 5.29 l/min MEDS: Reviewed Chest X-Ray: CXR Results  (Last 48 hours) 11/03/20 1119  XR CHEST PORT Final result Impression:  IMPRESSION:  
   
Stable exam. No evidence for pneumothorax Narrative:  history: Chest tube COMPARISON: 11/2/2020 FINDINGS:  
   
Frontal chest radiograph submitted for review. Stable left chest tube. No visualized pneumothorax. Stable left subclavian  
central line. Stable heart size. Unchanged diffuse interstitial lung opacities,  
greater on the left. Probable small bilateral pleural effusions. CT Results  (Last 48 hours) None ECHO: 10/30/20 Interpretation Summary Result status: Final result · LV: Estimated LVEF is 45 - 50%. Normal cavity size and wall thickness. Inconclusive left ventricular diastolic function. · LA: Moderately dilated left atrium. · MV: Mild mitral valve regurgitation is present. · TV: Moderate tricuspid valve regurgitation is present. · PA: Moderate pulmonary hypertension. · Pericardium: There is a large left pleural effusion. Multidisciplinary Rounds Completed:  Pending ABCDEF Bundle/Checklist Completed: 
Yes SPECIAL EQUIPMENT None DISPOSITION Transfer to non-ICU bed CRITICAL CARE CONSULTANT NOTE I had a face to face encounter with the patient, reviewed and interpreted patient data including clinical events, labs, images, vital signs, I/O's, and examined patient. I have discussed the case and the plan and management of the patient's care with the consulting services, the bedside nurses and the respiratory therapist.   
 
NOTE OF PERSONAL INVOLVEMENT IN CARE This patient has a high probability of imminent, clinically significant deterioration, which requires the highest level of preparedness to intervene urgently. I participated in the decision-making and personally managed or directed the management of the following life and organ supporting interventions that required my frequent assessment to treat or prevent imminent deterioration. I personally spent 30 minutes of critical care time. This is time spent at this critically ill patient's bedside actively involved in patient care as well as the coordination of care and discussions with the patient's family. This does not include any procedural time which has been billed separately. Stefan Garnica [facility] Critical Care Medicine [facility] [place] [facility]

## 2020-11-05 PROBLEM — C83.10 MANTLE CELL LYMPHOMA (HCC): Status: ACTIVE | Noted: 2020-01-01

## 2020-11-05 NOTE — WOUND CARE
WOCN Note:  
  
Follow up visit. Assessed in room 444. PPE: face shield, mask and gloves. 
  
Chart reviewed. Admitted DX:  Pleural effusion 10.30.2020 S/P Thoracoscopy and Talc pleurodesis 
  
Assessment:  
Patient is drowsy, communicative and requires assist of 2 with repositioning. Bed: foam mattress on total care; will order air mattress Patient has a Hodges and chest tube. Patient reports no pain. Patient repositioned on left side with pillow. Heels offloaded with pillows. Heels intact without erythema.  
  
1. Sacrum, non blanching purple that may continue to evolve: 5 x 4 x 0 cm. There is dark dyschromia to periwound. Applied Venelex and Mepilex transfer. 
  
Wound, Pressure Prevention & Skin Care Recommendations: 1. Minimize layers of linen/pads under patient to optimize support surface. 2.  Turn/reposition approximately every 2 hours and offload heels. Patient mobility team to assist with q2 turns. 3.  Manage moisture/ Keep skin folds clean and dry. 4.  Specialty bed: Prius air mattress ordered from [facility]. 5.  Sacrum:  Venelex TID and cover with foam dressing. 
  
Discussed above plan with Sadie WYMAN. 
  
Transition of Care: Plan to follow as needed while admitted to hospital. 
  
IKER Aguirre RN [facility] Inpatient Wound Care Available on [employer] Pager [phone] Office [phone]

## 2020-11-05 NOTE — PROGRESS NOTES
20:15 Pt has not yet arrived on 4W at change of shift Verbal shift change report given to Karen Verdugo RN (oncoming nurse) by Chelle West RN (offgoing nurse). Report included the following information SBAR, Procedure Summary, Intake/Output, MAR, Recent Results and Cardiac Rhythm Sinus tach.

## 2020-11-05 NOTE — CONSULTS
[facility]. 
[address] [facility] [address] [place], [address] 
[phone] GI CONSULTATION NOTE 
 
 
NAME:  Vanesa Oliver :   1945 MRN:   [id number] Referring Physician: Dr. Purnima Gutierrez Consult Date: 2020 Chief Complaint: PEG placement History of Present Illness:  Patient is a 70 y.o. who is seen in consultation at the request of Dr. Purnima Gutierrez  for PEG placement. Patient was admitted on 10/30/2020 for progressive shortness of breath and weight loss. She was admitted to and placed on high flow oxygen. Patient with past medical history of left breast cancer (treated with surgery and radiation) and asthma. Patient was evaluated by pulmonary and thoracics. She underwent VATS procedure with biopsy- findings of mantle cell lymphoma on 10/30/2020. Patient was intubated post surgery, extubated on the 2020. Post extubation patient started being followed by speech with weak cough/ vocal cord dysfunction, yet decline in PO function and having increase in respiratory effort for oropharyngeal function. Patient was drowsy at time of interview, answered yes/no questions. Also spoke with patient's son, Syeda Hodgson, of medical history. He denies any history of colorectal/GI disorders or malignancy for his mother. Reports she has never had colonoscopy. Denies smoking, minimal alcohol intake, and no NSAID usage. Reports mother was in decent condition prior to admission. PMH: 
History reviewed. No pertinent past medical history. PSH: 
History reviewed. No pertinent surgical history. Allergies: 
No Known Allergies Home Medications: 
None Hospital Medications: 
Current Facility-Administered Medications Medication Dose Route Frequency  allopurinoL (ZYLOPRIM) tablet 300 mg  300 mg Oral BID  alteplase (CATHFLO) 1 mg in sterile water (preservative free) 1 mL injection  1 mg InterCATHeter PRN  
  bacitracin 500 unit/gram packet 1 Packet  1 Packet Topical PRN  
 ferrous sulfate 300 mg (60 mg iron)/5 mL oral syrup 300 mg  300 mg Oral DAILY WITH BREAKFAST  balsam peru-castor oiL (VENELEX) ointment   Topical TID  midodrine (PROAMATINE) tablet 5 mg  5 mg Oral TID WITH MEALS  fentaNYL citrate (PF) injection 25-50 mcg  25-50 mcg IntraVENous Q2H PRN  
 ondansetron (ZOFRAN) injection 4 mg  4 mg IntraVENous Q6H PRN  
 white petrolatum-mineral oiL (AKWA TEARS) 83-15 % ophthalmic ointment   Both Eyes Q12H  piperacillin-tazobactam (ZOSYN) 3.375 g in 0.9% sodium chloride (MBP/ADV) 100 mL  3.375 g IntraVENous Q8H  
 sodium chloride (NS) flush 5-40 mL  5-40 mL IntraVENous Q8H  
 sodium chloride (NS) flush 5-40 mL  5-40 mL IntraVENous PRN  
 acetaminophen (TYLENOL) tablet 650 mg  650 mg Oral Q6H PRN Or  
 acetaminophen (TYLENOL) suppository 650 mg  650 mg Rectal Q6H PRN  polyethylene glycol (MIRALAX) packet 17 g  17 g Oral DAILY PRN Social History: 
Social History Tobacco Use  Smoking status: Not on file Substance Use Topics  Alcohol use: Not on file Family History: 
History reviewed. No pertinent family history. Objective:  
 
Patient Vitals for the past 8 hrs: 
 BP Temp Pulse Resp SpO2  
11/05/20 1110 135/62 98.4 °F (36.9 °C) 96 18 98 % 11/05/20 0827 (!) 127/53 97.3 °F (36.3 °C) 80 20 95 % 11/05 0701 - 11/05 1900 In: -  
Out: 1000 [Urine:800] 11/03 1901 - 11/05 0700 In: 1627.5 [I.V.:800] Out: 2790 [YZFTA:8404] PHYSICAL EXAM: 
General: WD, WN. Alert, cooperative, no acute distress, drowsy  
HEENT: NC, Atraumatic. PERRLA, EOMI. Anicteric sclerae. Lungs:  Left chest tube in place Abdomen: Soft, mild distention, Non tender, bowel sounds present, no HSM Extremities: Trace edema to bilateral lower extremities Neurologic:  CN 2-12 gi, Alert and oriented X 3. No acute neurological distress Psych:    Oriented X 3  Not anxious nor agitated. Data Review Recent Labs 11/04/20 
0404 11/03/20 
5201 WBC 20.7* 19.9* HGB 8.5* 6.9*  
HCT 30.1* 25.9*  
PLT 33* 31* Recent Labs 11/04/20 0404 11/03/20 
0337  143  
K 3.9 3.9 * 114* CO2 27 25 BUN 29* 26* CREA 0.72 0.75 GLU 84 136* PHOS 3.0  --   
CA 7.5* 7.3* No results for input(s): AP, TBIL, TP, ALB, GLOB, GGT, AML, LPSE in the last 72 hours. No lab exists for component: SGOT, GPT, AMYP, HLPSE No results for input(s): INR, PTP, APTT, INREXT in the last 72 hours. Assessment:  
Patient Active Problem List  
Diagnosis Code  Leukocytosis D72.829  
 Neutropenia ([facility]) D70.9  Anemia D64.9  Tachycardia R00.0  Thrombocytopenia ([facility] [address] D69.6  SIRS (systemic inflammatory response syndrome) ([facility]) R65.10  Pleural effusion, left J90  
 Acute respiratory failure with hypoxia ([facility]) J96.01 Plan: Dysphagia/ Acute Hypoxic respiratory failure related Lymphoma:  
-CT scan chest 10/30/2020: There is no pulmonary embolism. There is no aortic aneurysm or dissection. Large left-sided pleural effusion with left lung atelectasis. Left to right midline shift. There is mediastinal and hilar adenopathy which is 
extensive. Neoplastic etiology should be considered 
-CT scan abdomen 10/30/2020: There is massive splenomegaly with extensive retroperitoneal adenopathy. -s/p left VATS procedure for large left pleural effusion- followed by Thoracics 
-Patient followed by speech, progressive decrease in PO intake, failed previous PO trials 
-Hemoglobin stable 8.5  
-V Zosyn in use  
-patient followed by Heme-Onc and palliative 
-Patient to be scheduled for EGD/PEG tube placement on 11/6/2020 for nutrition. Patient to be NPO and no Dobhoff feeds after midnight. Patient and patient's son agreeable to PEG placement I have examined the patient. I have reviewed the chart and agree with the documentation recorded by the NP, including the assessment, treatment plan, and disposition. ASSESSMENT AND PLAN: 
70 yr old lady has presented with dysphagia, stage IV mantle Lymphoma S/P VATS procedure and she has recovered from acute hypoxic respiratory failure. Large pleural effusion was removed by thoracic surgery. She was found to be at high risk for aspiration during swallowing evaluation by speech therapy. Continue NG feedings Stop feedings at [facility] EGD/PEG tomorrow, discussed with patient and her son and they are in agreement Thank you for consultation.  
 
Aevlino Casey MD

## 2020-11-05 NOTE — PROGRESS NOTES
Hematology-Oncology Progress Note Linden Dukes 
1945 
[id number] 
11/5/2020 Subjective:  
 
Consent signed. Chemotherapy drugs not available today. Feeling about the same, asking for ice and food. Speech therapy consult pending. Allergies: Patient has no known allergies. Current Facility-Administered Medications Medication Dose Route Frequency Provider Last Rate Last Dose  allopurinoL (ZYLOPRIM) tablet 300 mg  300 mg Oral BID Neftali Pugh MD      
 0.9% sodium chloride infusion 250 mL  250 mL IntraVENous PRN Marlene Christie NP-C      
 ferrous sulfate 300 mg (60 mg iron)/5 mL oral syrup 300 mg  300 mg Oral DAILY WITH BREAKFAST Gisel ESTES NP-C   300 mg at 11/04/20 4745  balsam peru-castor oiL (VENELEX) ointment   Topical TID Marc Chauhan DO      
 0.9% sodium chloride infusion 250 mL  250 mL IntraVENous PRN Gisel ESTES NP-C      
 midodrine (PROAMATINE) tablet 5 mg  5 mg Oral TID WITH MEALS Reji TREY Mejia   Stopped at 11/04/20 8292  fentaNYL citrate (PF) injection 25-50 mcg  25-50 mcg IntraVENous Q2H PRN Marlene Christie NP-C   50 mcg at 11/02/20 2026  ondansetron (ZOFRAN) injection 4 mg  4 mg IntraVENous Q6H PRN Gisel ESTES NP-C      
 white petrolatum-mineral oiL (AKWA TEARS) 83-15 % ophthalmic ointment   Both Eyes Q12H Marlene Christie NP-C  piperacillin-tazobactam (ZOSYN) 3.375 g in 0.9% sodium chloride (MBP/ADV) 100 mL  3.375 g IntraVENous Q8H Gisel ESTES NP-C 25 mL/hr at 11/05/20 0516 3.375 g at 11/05/20 0516  sodium chloride (NS) flush 5-40 mL  5-40 mL IntraVENous Q8H Nahum Stephens MD   10 mL at 11/04/20 0019  
 sodium chloride (NS) flush 5-40 mL  5-40 mL IntraVENous PRN Crystal Aguilar MD   20 mL at 11/02/20 0434  acetaminophen (TYLENOL) tablet 650 mg  650 mg Oral Q6H PRN Crystal Aguilar MD      
 Or  
 acetaminophen (TYLENOL) suppository 650 mg  650 mg Rectal Q6H PRN Cynthia Gallagher MD      
 polyethylene glycol (MIRALAX) packet 17 g  17 g Oral DAILY PRN Cynthia Gallagher MD      
 
Objective:  
 
Patient Vitals for the past 24 hrs: 
 BP Temp Pulse Resp SpO2 Weight 11/05/20 0409 (!) 122/58 98.9 °F (37.2 °C) 100 19 96 %   
11/05/20 0209      50.6 kg (111 lb 8.8 oz) 11/05/20 0124 123/61 99.1 °F (37.3 °C) 99 22 96 %   
11/04/20 2047 (!) 112/53 99.1 °F (37.3 °C) 89 20    
11/04/20 2000 (!) 102/57 98.8 °F (37.1 °C) (!) 109 22 100 %   
11/04/20 1800 110/61  (!) 110 23 99 %   
11/04/20 1700 110/60    97 %   
11/04/20 1600 (!) 120/99 97.3 °F (36.3 °C) (!) 104 25 93 %   
11/04/20 1515   (!) 104 25 90 %   
11/04/20 1500 108/68  (!) 103 25 91 %   
11/04/20 1430   (!) 106 23 100 %   
11/04/20 1415   (!) 107 22 100 %   
11/04/20 1400 (!) 101/55  (!) 106 21 100 %   
11/04/20 1345   100 13 100 %   
11/04/20 1330   100 29 100 %   
11/04/20 1315   (!) 101 18 100 %   
11/04/20 1300 103/67  (!) 108 21 100 %   
11/04/20 1200 101/61 98.3 °F (36.8 °C) (!) 106 17 100 %   
11/04/20 1145   (!) 112 14 98 %   
11/04/20 1130   (!) 106 22 100 %   
11/04/20 1115   (!) 107 21 100 %   
11/04/20 1100 (!) 96/57  (!) 104 19 100 %   
11/04/20 1000 101/61  (!) 109 21 100 %   
11/04/20 0930   (!) 109 16 100 %   
11/04/20 0900 (!) 97/54  (!) 102 15 100 %   
11/04/20 0800 (!) 100/55 99.1 °F (37.3 °C) (!) 112 19 100 %  Gen: Awake and alert, ill appearing with bitemporal wasting HEENT: PERRL, Sclerae anicteric Cv: RRR without m/r/g Pulm: CTA bilaterally Abd: NABS, NTND, No HSM Ext: 2+ edema Available labs reviewed: 
Labs:   
Recent Results (from the past 24 hour(s)) SAMPLES BEING HELD Collection Time: 11/05/20  5:30 AM  
Result Value Ref Range SAMPLES BEING HELD 1lav COMMENT Add-on orders for these samples will be processed based on acceptable specimen integrity and analyte stability, which may vary by analyte. URIC ACID Collection Time: 11/05/20  5:30 AM  
Result Value Ref Range Uric acid 5.1 2.6 - 6.0 MG/DL Assessment and Plan  
 
69 y/o woman with newly diagnosed stage IV mantle cell lymphoma. 1. MCL: Will start bendamustin/rituximab as soon as it is available in the hospital. Case d/w son Benjamin Ramos at bedside. Questions answered regarding risks and benefits. Will start allopurinol today to reduce the risk of tumor lysis syndrome. Hepatitis B serologies pending. Uric acid noted. 2. T'cytopenia: secondary to #1 above. Transfuse for < 10.  
 
3. Leukocytosis: please note that diff reveals the majority of her WBCs are lymphocytes, most likely due to the leukemic phase of her MCL and likely does not represent infection. Thank you for allowing us to participate in the care of this very pleasant patient. Justen Contreras MD 
Hematology/Oncology Phone [phone]

## 2020-11-05 NOTE — ROUTINE PROCESS
TRANSFER - OUT REPORT: 
 
Verbal report given to Sadie RN(name) on Susie Samayoa  being transferred to [facility](unit) for routine progression of care Report consisted of patients Situation, Background, Assessment and  
Recommendations(SBAR). Information from the following report(s) SBAR, Kardex, ED Summary, Procedure Summary, Intake/Output, MAR, Accordion and Recent Results was reviewed with the receiving nurse. Lines:  
Time Arthur quad lumen 10/31/20 Left Subclavian (Active) Central Line Being Utilized Yes 11/04/20 1600 Criteria for Appropriate Use Hemodynamically unstable, requiring monitoring lines, vasopressors, or volume resuscitation 11/04/20 1600 Site Assessment Clean, dry, & intact 11/04/20 1600 Infiltration Assessment 0 11/04/20 1600 Affected Extremity/Extremities Color distal to insertion site pink (or appropriate for race); Pulses palpable;Range of motion performed 11/04/20 1600 Date of Last Dressing Change 11/03/20 11/04/20 1600 Dressing Status Clean, dry, & intact 11/04/20 1600 Dressing Type Disk with Chlorhexadine gluconate (CHG) 11/04/20 1600 Action Taken Open ports on tubing capped 11/04/20 1600 Proximal Hub Color/Line Status White 11/04/20 1600 Positive Blood Return (Medial Site) Yes 11/04/20 1600 Medial 1 Hub Color/Line Status Gray 11/04/20 1600 Positive Blood Return (Lateral Site) Yes 11/04/20 1600 Medial 2 Hub Color/Line Status Blue 11/04/20 1600 Positive Blood Return (Site #3) Yes 11/04/20 1600 Distal Hub Color/Line Status Brown 11/04/20 1600 Positive Blood Return (Site #4) Yes 11/04/20 1600 Alcohol Cap Used Yes 11/04/20 1600 Peripheral IV 10/30/20 Right Antecubital (Active) Site Assessment Clean, dry, & intact 11/04/20 1600 Phlebitis Assessment 0 11/04/20 1600 Infiltration Assessment 0 11/04/20 1600 Dressing Status Clean, dry, & intact 11/04/20 1600 Dressing Type Transparent;Tape 11/04/20 1600 Hub Color/Line Status Green;Flushed;Capped 11/04/20 0400 Action Taken Open ports on tubing capped 11/04/20 1600 Alcohol Cap Used Yes 11/04/20 1600 Opportunity for questions and clarification was provided. Patient transported with: 
 Monitor O2 @ 2 liters Tech

## 2020-11-05 NOTE — PROGRESS NOTES
Problem: Dysphagia (Adult) Goal: *Acute Goals and Plan of Care (Insert Text) Description: Speech Therapy Goals Initiated 11/2/2020 1. Patient will tolerate pureed diet/thin liquids without adverse effects within 7 days. 2. Patient will resume baseline diet without adverse effects within 7 days. Outcome: Not Progressing Towards Goal 
  
SPEECH LANGUAGE PATHOLOGY DYSPHAGIA TREATMENT Patient: Avis Castro (64 y.o. female) Date: 11/5/2020 Diagnosis: Pleural effusion, left [J90] Acute respiratory failure with hypoxia ([address] [J96.01] Tachycardia [R00.0] Leukocytosis [D72.829] SIRS (systemic inflammatory response syndrome) ([facility]) [R65.10] Anemia [D64.9] Neutropenia ([address] [D70.9] Thrombocytopenia ([address] [D69.6] Acute respiratory failure with hypoxia ([address] Procedure(s) (LRB): LEFT VATS PLEURAL BIOPSIES POSSIBLE TALC PLEURODESIS/ (Left) 6 Days Post-Op Precautions: aspiration, fall ASSESSMENT: 
Patient with no improvements in swallow since re-assessment yesterday with moderate oral and severe pharyngeal dysphagia. Significant effort for manipulation of ice chip and purees with delayed oral clearance and tongue pumping noted for propulsion. Pharyngeal swallow characterized by delayed swallow initiation, reduced hyolaryngeal elevation/excursion via palpation and multiple swallows per bolus (3-6) suggestive of pharyngeal residue. Increased RR from teens to high 20's/low 30's with scant trials, wet vocal quality and weak wet cough after PO trials concerning for aspiration. Also with rapid fatigue and needed rest breaks during conversation related to work of breathing with any exertion. Patient's mental status, level of alertness, respiratory fatigue, and overall debility post intubation all increase her aspiration risks and she has shown decline in swallow function since initial evaluation earlier this week. Patient is not appropriate for initiation of diet at this time.     
 
PLAN: 
 Recommendations and Planned Interventions: 
--recommend continued NPO with consideration of alternative route of medications and nutrition at this time. Note Palliative Care consult earlier this week with patient/family desiring all aggressive care, however, she is showing decreased tolerance of PO intake since meeting earlier this week. Suspect she may benefit from additional discussions pending further progress 
--recommend allowing scant ice chips, 2-3 every hour or two after oral care, to maintain oral integrity and for swallowing rehabilitation. --will continue to follow for re-assessment of swallow function Patient continues to benefit from skilled intervention to address the above impairments. Continue treatment per established plan of care. Discharge Recommendations: To Be Determined SUBJECTIVE:  
Patient stated I need to poop. OBJECTIVE:  
Cognitive and Communication Status: 
Neurologic State: Alert, Drowsy(periods of drowsiness) Orientation Level: Oriented to person, Oriented to place Cognition: Decreased attention/concentration, Decreased command following Perception: Appears intact Perseveration: No perseveration noted Safety/Judgement: Not assessed Dysphagia Treatment: 
Oral Assessment: P.O. Trials: 
Patient Position: upright in bed, decreased head control Vocal quality prior to P.O.: Low volume Consistency Presented: Ice chips;Puree How Presented: SLP-fed/presented;Spoon Bolus Acceptance: No impairment Bolus Formation/Control: Impaired Type of Impairment: Delayed Propulsion: Delayed (# of seconds) Oral Residue: None Initiation of Swallow: Delayed (# of seconds) Laryngeal Elevation: Decreased Aspiration Signs/Symptoms: Clear throat; Change vocal quality;Delayed cough/throat clear(weak ) Pharyngeal Phase Characteristics: Multiple swallows; Suspected pharyngeal residue;Poor endurance Effective Modifications: None Oral Phase Severity: Moderate Pharyngeal Phase Severity : Severe Exercises: 
Laryngeal Exercises: 
  
  
  
  
  
  
  
  
  
  
  
  
  
  
  
  
  
  
  
  
  
  
  
  
  
  
  
  
  
  
  
  
  
  
  
  
  
  
  
  
  
  
  
Pain: 
Pain Scale 1: Numeric (0 - 10) Pain Intensity 1: 0 After treatment:  
Patient left in no apparent distress in bed, Call bell within reach, Nursing notified, and Caregiver / family present COMMUNICATION/EDUCATION:  
 
The patient's plan of care including recommendations, planned interventions, and recommended diet changes were discussed with: Registered nurse. Shaan Redmond M.CD. CCC-SLP Time Calculation: 11 mins

## 2020-11-05 NOTE — PROGRESS NOTES
Physical Therapy Screening: [facility] based on results obtained during the nursing admission assessment. The patients chart was reviewed and the patient is appropriate for a skilled therapy evaluation if there is a decline in functional mobility from baseline. Please order a consult for physical therapy if you are in agreement and would like an evaluation to be completed. Thank you.  
 
Aylin Anders, PT

## 2020-11-05 NOTE — PROGRESS NOTES
Physician Progress Note Sari Paz 
CSN #:                  [id number] :                       1945 ADMIT DATE:       10/30/2020 12:17 AM 
DISCH DATE: 
RESPONDING 
PROVIDER #:        Jimena PABLO 
 
 
 
 
QUERY TEXT: 
 
Dear Attending Provider, Patient admitted with a malignant pleural effusion. If possible, please document in progress notes and discharge summary if you are evaluating and /or treating any of the following: The medical record reflects the following: 
Risk Factors: 76yo Clinical Indicators: per registered dietician consult note: Pt is very weak, laying in bed, nods to answer brief questions from RD. NPO 2/2 weak cough and risk of aspiration. Wt appears to have been trending up since admission. Energy Intake:  7 - 50% or less of est energy requirements for 5 or more days, Body Fat Loss:  7 - Moderate body fat loss, Fat overlying ribs, Buccal region, Orbital, Muscle Mass Loss:  7 - Moderate muscle mass loss, Temples (temporalis), Hand (interosseous), Fluid Accumulation:  7 - Moderate to severe, Extremities, Generalized, Inadequate protein-energy intake related to impaired respiratory function, catabolic illness as evidenced by NPO or clear liquid status due to medical condition, severe muscle loss, severe loss of subcutaneous fat, localized or generalized fluid accumulation, and severe malnutrition,  Thoracic surgery: Cachectic appearing Treatment: followed by Registered Dietician, patient receiving tube feedings (Osmolite 1.5 @ 20 ml/hr-now suggested 45 ml/hr), and daily weight Thank you, Kike [place] [phone] Options provided: -- Protein calorie malnutrition severe -- Protein calorie malnutrition moderate 
-- Other - I will add my own diagnosis -- Disagree - Not applicable / Not valid -- Disagree - Clinically unable to determine / Unknown 
-- Refer to Clinical Documentation Reviewer PROVIDER RESPONSE TEXT: 
 This patient has severe protein calorie malnutrition.  
 
Query created by: Nkia Quiroz on 11/4/2020 4:24 PM 
 
 
Electronically signed by:  Debra PABLO 11/5/2020 10:40 AM

## 2020-11-05 NOTE — PROGRESS NOTES
08:38 Notified MD holding all PO meds as pt NPO for risk of aspiration. [address] [facility] Notified MD pt failed second swallow test, Continuing to hold all PO meds. Per SLP, Pt should not be receiving any PO meds. Ice chips permissible occasionally. 11:30 Notified MD Pt and son requesting that MD discuss feeding tube options before deciding further action. 15:08 Requested MD change meds to IV or liquid as crushed meds will clog [place]. 
 
16:30 No changes to medications (to oral). Notified MD will hold all PO meds until receive further notice.

## 2020-11-05 NOTE — PROGRESS NOTES
Mrs. Barbara Ledesma is POD#6 after a left VATS, pleural biopsies, talc pleurodesis and drainage of a >3L effusion. She was transferred out of the ICU yesterday. 
  
Afebrile HD stable 
  
CT no air leak, ~400ml serous 
  
On exam she is seated upright in bed Alert and oriented Weak voice Cachectic appearing Lungs CTA b/l Dressing c/d/i 
rrr 
  
  
CXR- much better expansion on left side, effusion resolved, mediastinal lymphadenopathy Path-  Pleural and diaphragmatic biopsies show mantle cell lymphoma Diagnoses  1- Acute hypoxic respiratory failure  2: Malignant pleural effusion  3: Mantle cell lymphoma  
 
 
Mrs. Barbara Ledesma has chosen to undergo chemotherapy treatment. To be directed by Dr. Zelda Paiz and [facility]. No port needed. The talc pleurodesis was not effective for her effusion. Since she will remain inpatient for induction chemo we will plan to clamp her chest tube over the weekend and convert the chest tube to a Pleur-X catheter on Monday in the OR.

## 2020-11-05 NOTE — ADVANCED PRACTICE NURSE
Met with Marquise Presley and her son Faby Daniels to discuss placement of a pleurX catheter for long term management of her effusion. Gave them the patient information packet with written instructions for drainage and dressing techniques and CD to view a demonstration of the drainage and dressing techniques. I answered all their questions to the best of my ability. Plan for placement on Monday by Dr Lin Orlando.

## 2020-11-05 NOTE — PROGRESS NOTES
[address] [facility] Adult  Hospitalist Group Hospitalist Progress Note Ej Bernabe MD 
Answering service: [phone] OR [phone] from in house phone Date of Service:  2020 NAME:  Barbara Wagner :  1945 MRN:  [id number] Admission Summary:  
Barbara Wagner is a 70 y.o. female with past medical history of left breast cancer, s/p radiation, and childhood asthma presented to the ED from home with chief complaint of SOB, leg swelling, and weight loss. Symptoms reportedly have been gradual in onset, worsening, now severe, constant, with SOB, LOPEZ, orthopnea. Patient reported no known chronic medical conditions. There were no reports of recent sick contacts or exposure to anyone with COVID 19. On arrival in the ED tonight, initial recorded vital signs were BP = 140/93, HR= 122, RR = 26, O2sat = 89% on room air. Patient was initially placed on 2 lites O2 via nasal cannula (NC). However per ED MD report, patient has becoming increasing dyspneic, with increased supplemental oxygen requirement. Patient had no known prior lung disease. CTA chest revealed a large left pleural effusion with left lung atelectasis. Labs revealed WBC = 27.2, neutrophils = 2%, and absolute neutrophil count = 0.5. Hemoglobin = 6.4 (with no comparison lab available for review). ED offered PRBC transfusion but patient reportedly refused. She was started on Levofloxacin 750 mg IV for antibiotic coverage. Patient is now seen for admission to the hospitalist service. Interval history / Subjective: Follow up SOB, acute hypoxic respiratory failure Patient seen and examined at the bedside. Labs, images and notes reviewed Discussed with nursing staff, orders reviewed. Plan discussed with patient/Family Feeling hungry. Doing ok. Denied any SOB, chest pain. Weakness ++. D/w pt, son at bedside, nurse at bedside. D/w ST on floor. Willing for Feeding tube, NGT now and PEG tube as appropriate. Would get GI consult as well for the same. Very high risk for aspiration. Assessment & Plan: #Acute hypoxic respiratory failure s/p intubation with mechanical ventilation. Now extubated and tolerating well. Currently on O2 5 LPM NC #Large malignant left pleural effusion s/p VATS with CT placement by thoracic surgery, nonresolving. Failed talc pleurodesis. Thoracic surgery on board #Symptomatic anemia secondary to mild blood loss at CT site, s/p PRBC transfusion #Dysphagia with high aspiration risk per  Stage IV mantle cell lymphoma #Acute on chronic thrombocytopenia, s/p platelets, due to malignancy #Splenomegaly with retroperitoneal adenopathy on CT A/P, concern for lymphoproliferative disorder 
 
 
-Admitted to ICU, improved and subsequently transferred to [address] [place] [address] on board 
-Awaiting chemotherapy initiation 
-Thoracic surgery on board 
-Plan for CT removal along with Pleurx catheter placement on Monday 11/9 
-ST on board, failed swallow eval, high aspiration risk 
-GI consult for PEG tube placement 
-Dobbhoff tube placement 
-Initiate tube feedings per nutrition team recommendations 
-Strict I/os 
-Hodges catheter for the same. If remains stable DC in 24-48 hours -PRBC/platelet transfusion as needed, goals: Hb <7, platelet <53 
-Supportive carebreathing treatment, pain management 
-Palliative care on board. GOC with curative intent. Code status: Full code DVT prophylaxis: SCDs Care Plan discussed with: Patient/Family and Nurse Anticipated Disposition: TBD Anticipated Discharge: Greater than 48 hours Hospital Problems  Date Reviewed: 10/30/2020 Codes Class Noted POA Leukocytosis ICD-10-CM: K00.690 ICD-9-CM: 288.60  10/30/2020 Unknown Neutropenia ([facility] [address] ICD-10-CM: D70.9 ICD-9-CM: 288.00  10/30/2020 Unknown Anemia ICD-10-CM: D64.9 ICD-9-CM: 285.9  10/30/2020 Unknown Tachycardia ICD-10-CM: R00.0 ICD-9-CM: 785.0  10/30/2020 Unknown Thrombocytopenia ([facility] ICD-10-CM: D69.6 ICD-9-CM: 287.5  10/30/2020 Unknown SIRS (systemic inflammatory response syndrome) (HCC) ICD-10-CM: R65.10 ICD-9-CM: 995.90  10/30/2020 Unknown Pleural effusion, left ICD-10-CM: J90 ICD-9-CM: 511.9  10/30/2020 Unknown * (Principal) Acute respiratory failure with hypoxia ([facility] ICD-10-CM: J96.01 
ICD-9-CM: 518.81  10/30/2020 Yes Review of Systems: A comprehensive review of systems was negative except for that written in the HPI. Vital Signs:  
 Last 24hrs VS reviewed since prior progress note. Most recent are: 
Visit Vitals /62 (BP 1 Location: Right arm, BP Patient Position: At rest) Pulse 96 Temp 98.4 °F (36.9 °C) Resp 18 Ht 5' (1.524 m) Wt 50.6 kg (111 lb 8.8 oz) SpO2 98% BMI 21.79 kg/m² Intake/Output Summary (Last 24 hours) at 11/5/2020 1234 Last data filed at 11/5/2020 1216 Gross per 24 hour Intake  Output 1070 ml Net -1070 ml Physical Examination:  
 
I had a face to face encounter with this patient and independently examined them on 11/5/2020 as outlined below: 
 
     
General:          Alert, cooperative, no distress, appears stated age. Frail and weak HEENT:           Atraumatic, anicteric sclerae, pink conjunctivae No oral ulcers, mucosa dry, throat clear, dentition fair Neck:               Supple, symmetrical 
Lungs:             Clear to auscultation bilaterally. No Wheezing or Rhonchi. No rales. Chest wall:      No tenderness  No Accessory muscle use. Left subclavian central line in place Heart:              Regular  rhythm,  No  murmur   No edema Abdomen:        Soft, non-tender. Not distended. Bowel sounds normal 
Extremities:     No cyanosis.   No clubbing,   
 Skin turgor normal, Capillary refill normal 
Skin:                Not pale. Not Jaundiced  No rashes Psych:             Not anxious or agitated. Neurologic:      Alert, moves all extremities, answers questions appropriately and responds to commands Data Review:  
 Review and/or order of clinical lab test 
Review and/or order of tests in the radiology section of [facility] Review and/or order of tests in the medicine section of [facility] Radiology Xr Abd (kub) Result Date: 10/30/2020 IMPRESSION: 1. Nasogastric tube in satisfactory position 2. Massive splenomegaly Cta Chest W Or W Wo Cont Result Date: 10/30/2020 IMPRESSION: There is no pulmonary embolism. There is no aortic aneurysm or dissection. Large left-sided pleural effusion with left lung atelectasis. Left to right midline shift. There is mediastinal and hilar adenopathy which is extensive. Neoplastic etiology should be considered. Ct Abd Pelv Wo Cont Result Date: 10/30/2020 IMPRESSION: There is massive splenomegaly with extensive retroperitoneal adenopathy. Findings are consistent with lymphoproliferative disorder. Xr Chest [place] Result Date: 11/3/2020 IMPRESSION: Stable exam. No evidence for pneumothorax Xr Chest [place] Result Date: 11/2/2020 IMPRESSION: Left chest tube remains in good position. Small left pleural effusion. No pneumothorax. Increased bilateral pulmonary edema versus pneumonia. Xr Chest [place] Result Date: 10/31/2020 IMPRESSION: Left-sided central venous access catheter without pneumothorax. Catheter in appropriate position for use. Extensive left lung parenchymal opacity with improved left-sided effusion. Interval left pleural drainage catheter placement. Mediastinal and hilar adenopathy with left hilar mass lesion/pneumonia. Cardiomegaly. Deb Soni Chest [place] Result Date: 10/30/2020 IMPRESSION: 1. Decreased size of left-sided pleural effusion post chest tube placement 2. Persistent bilateral airspace disease. Prominence in the region of the aortic knob Xr Chest [facility] Result Date: 10/30/2020 IMPRESSION: Extensive left lung parenchymal opacity with parapneumonic effusion. Mediastinal adenopathy is suspected. Cardiomegaly. .  
 
 
Labs:  
 
Recent Labs 11/04/20 
0404 11/03/20 
6167 WBC 20.7* 19.9* HGB 8.5* 6.9*  
HCT 30.1* 25.9*  
PLT 33* 31* Recent Labs 11/05/20 
0530 11/04/20 
0404 11/03/20 
0359 NA  --  144 143 K  --  3.9 3.9 CL  --  113* 114* CO2  --  27 25 BUN  --  29* 26* CREA  --  0.72 0.75 GLU  --  84 136* CA  --  7.5* 7.3*  
MG  --  1.9  --   
PHOS  --  3.0  --   
URICA 5.1  --   -- No results for input(s): ALT, AP, TBIL, TBILI, TP, ALB, GLOB, GGT, AML, LPSE in the last 72 hours. No lab exists for component: SGOT, GPT, AMYP, HLPSE No results for input(s): INR, PTP, APTT, INREXT in the last 72 hours. No results for input(s): FE, TIBC, PSAT, FERR in the last 72 hours. Lab Results Component Value Date/Time Folate 15.5 10/31/2020 06:47 PM  
  
No results for input(s): PH, PCO2, PO2 in the last 72 hours. No results for input(s): CPK, CKNDX, TROIQ in the last 72 hours. No lab exists for component: CPKMB No results found for: CHOL, CHOLX, CHLST, CHOLV, HDL, HDLP, LDL, LDLC, DLDLP, TGLX, TRIGL, TRIGP, CHHD, CHHDX Lab Results Component Value Date/Time Glucose (POC) 102 (H) 10/31/2020 06:54 PM  
 
No results found for: COLOR, APPRN, SPGRU, REFSG, TONNY, PROTU, GLUCU, KETU, BILU, UROU, ALEX, LEUKU, GLUKE, EPSU, BACTU, WBCU, RBCU, CASTS, UCRY Medications Reviewed:  
 
Current Facility-Administered Medications Medication Dose Route Frequency  allopurinoL (ZYLOPRIM) tablet 300 mg  300 mg Oral BID  alteplase (CATHFLO) 1 mg in sterile water (preservative free) 1 mL injection  1 mg InterCATHeter PRN  
  bacitracin 500 unit/gram packet 1 Packet  1 Packet Topical PRN  
 ferrous sulfate 300 mg (60 mg iron)/5 mL oral syrup 300 mg  300 mg Oral DAILY WITH BREAKFAST  balsam peru-castor oiL (VENELEX) ointment   Topical TID  midodrine (PROAMATINE) tablet 5 mg  5 mg Oral TID WITH MEALS  fentaNYL citrate (PF) injection 25-50 mcg  25-50 mcg IntraVENous Q2H PRN  
 ondansetron (ZOFRAN) injection 4 mg  4 mg IntraVENous Q6H PRN  
 white petrolatum-mineral oiL (AKWA TEARS) 83-15 % ophthalmic ointment   Both Eyes Q12H  piperacillin-tazobactam (ZOSYN) 3.375 g in 0.9% sodium chloride (MBP/ADV) 100 mL  3.375 g IntraVENous Q8H  
 sodium chloride (NS) flush 5-40 mL  5-40 mL IntraVENous Q8H  
 sodium chloride (NS) flush 5-40 mL  5-40 mL IntraVENous PRN  
 acetaminophen (TYLENOL) tablet 650 mg  650 mg Oral Q6H PRN Or  
 acetaminophen (TYLENOL) suppository 650 mg  650 mg Rectal Q6H PRN  polyethylene glycol (MIRALAX) packet 17 g  17 g Oral DAILY PRN  
 
______________________________________________________________________ EXPECTED LENGTH OF STAY: 7d 21h ACTUAL LENGTH OF STAY:          6 Ej Bernabe MD

## 2020-11-05 NOTE — PROGRESS NOTES
TRANSFER - IN REPORT: 
 
Verbal report received from ZAMZAM Metcalf(name) on [employer]  being received from ICU(unit) for routine progression of care Report consisted of patients Situation, Background, Assessment and  
Recommendations(SBAR). Information from the following report(s) SBAR, Procedure Summary, Intake/Output, MAR, Recent Results and Cardiac Rhythm Sinus Tach was reviewed with the receiving nurse. Opportunity for questions and clarification was provided. Assessment completed upon patients arrival to unit and care assumed. Pt had not yet arrived on 4W before shift change.

## 2020-11-05 NOTE — PROGRESS NOTES
Problem: Breathing Pattern - Ineffective Goal: *Absence of hypoxia Outcome: Progressing Towards Goal 
  
Problem: Falls - Risk of 
Goal: *Absence of Falls Description: Document Jeny Oh Fall Risk and appropriate interventions in the flowsheet. Outcome: Progressing Towards Goal 
Note: Fall Risk Interventions: 
Mobility Interventions: Communicate number of staff needed for ambulation/transfer Mentation Interventions: Adequate sleep, hydration, pain control Medication Interventions: Patient to call before getting OOB Elimination Interventions: Patient to call for help with toileting needs Problem: Pressure Injury - Risk of 
Goal: *Prevention of pressure injury Description: Document Mauro Scale and appropriate interventions in the flowsheet. Outcome: Progressing Towards Goal 
Note: Pressure Injury Interventions: 
Sensory Interventions: Assess changes in LOC Moisture Interventions: Absorbent underpads Activity Interventions: Pressure redistribution bed/mattress(bed type) Mobility Interventions: Pressure redistribution bed/mattress (bed type) Nutrition Interventions: Document food/fluid/supplement intake Friction and Shear Interventions: Apply protective barrier, creams and emollients Problem: Patient Education: Go to Patient Education Activity Goal: Patient/Family Education Outcome: Not Progressing Towards Goal 
  
Problem: Nutrition Deficit Goal: *Optimize nutritional status Outcome: Not Progressing Towards Goal

## 2020-11-05 NOTE — PROGRESS NOTES
Bedside and Verbal shift change report given toVicky Garcia (oncoming nurse) by Onofre Spicer (offgoing nurse). Report included the following information Kardex, Intake/Output, MAR, Recent Results and Cardiac Rhythm NSR.

## 2020-11-06 NOTE — PROGRESS NOTES
[address] to Dr. Niki Faustin about pt's treatment plan. Pt to receive chemo today. MD aware of pt's plts of 40, would like to proceed with treatment. 1883 Spoke to Eri about pt's procedure time. Pt' EGD/PEG to be done at 1630. Notified endo that pt needs to receive chemo today as well. They stated to proceed with treatment and they will work around her chemo. 6342 Discussed starting IVF and ordering labs to monitor for TLS. MD to order IVF and states pt already has daily labs. Also, asked if pt required any prophylactic antibiotics for regimen. MD states no.  
 
7732 Reviewed Rituxan and Bendamustine and potential side effects with pt and son. Literature provided on each drug, as well as \"Chemotherapy and You\" book. Pt and son deny questions at this time. 1052 Received call from pharmacy. Pt's drugs will not arrive to the hospital until 11:30am. 
 
1113 Notified Dr. Pauly Mackay that pt's chemo will not arrive to hospital at 1130am.  MD would like Endo to be updated regarding chemo time in order to decide if procedure will be done today or Monday. 200 Updated pt's son that chemo will be given this afternoon and EGD/PEG will be moved to Monday. [address] [facility]'s plan. Pt's EGD/PEG to be moved to Monday. [address] [facility] [place] at [facility] about pt's status. Pt tachycardic, tachypneic, using accessory muscles. O2 sats dipped into the 80s, placed on 2L O2.   
 
8151  Received call back from [place]. Rituxan can be run slowly. Day 1 of Bendamustine may be given on Saturday. Transfer to 6E order to be discontinued. [address], pharmacist, on plan for chemo. Also, updated pt on plan. Sharath Trujillo [address] Notified Dr. Ganga Nascimento that pt's O2 sats dropped to 80s, was placed on 2L O2. Placed order for 2L O2 prn.

## 2020-11-06 NOTE — DISCHARGE INSTRUCTIONS
Discharge Instructions for Chemotherapy/Biotherapy    Chemotherapy has the potential to cause many side effects. The following are general precautions that chemo patients should take:   1. Practice good hand washing:    Use soap and water for at least 15 seconds, covering all areas of hands.  Always wash hands before eating.  Wash hands after contact with public surfaces such as door knobs and handles, shopping carts, telephones and elevator buttons. 2. Get plenty of rest:    You will likely experience fatigue three to five days following your treatment. It may last as long as seven days. 3. Drink plenty of fluids. Water is best.   4. Eat a well balanced diet:    Small frequent meals may help if you are having trouble with nausea or your appetite. Some people also do well with nutritional supplements. 5. Pace yourself with daily activities:    Take frequent breaks and ask for help if you need it. 6. Exercise is very important:    It will increase circulation and will help the fatigue. Do what you can each day. 7. If your regimen results in hair loss:    You will likely notice effects between two and three weeks following your first treatment. Some lose all hair while others only experience thinning. 8. Practice good oral hygiene:   Elly Bear your M.D. immediately if any mouth sores or discomfort develop. 9. Protect yourself from the sun. Signs/Symptoms of an allergic reaction and/or some side effects may require immediate medical attention. Notify your physician if you develop one or more of the following:   Temperature of 100.5 degrees or greater;   Skin redness, itching, swelling, blistering, weeping, crusting, rash, or hives;    Wheezing, chest tightness, cough, or shortness of breath;   Swelling of the face, eyelids, lips, tongue, or throat;   Severe, persistent headache;   Stuffy nose, runny nose, sneezing;   Red (bloodshot), itchy, swollen, or watery eyes;   Stomach pain, nausea, vomiting, diarrhea, or bloody stools;   Mouth sores  Your physician should also be aware of the following symptoms:   Persistent and unresolved nausea and/or vomiting;   Persistent and unresolved diarrhea or constipation;   Numbness/tingling/burning of the extremities, including the fingers and toes; Bleeding or unexplained bruising; Unexplained redness/swelling/pain in the arms or legs; Shortness of breath or fatigue that worsens;   Pain with urination or blood in the urine; Chills;   Cough, especially a productive cough;   Mouth sores or a white coating of the tongue; Redness, swelling, pain or drainage at the port-a-cath or IV site; Increased feeling of bloating or water retention; Excessive weight loss or gain;   Ringing in the ears; Difficulty swallowing;   Dizziness, vertigo, lightheadedness, or fainting. Chemotherapy can cause birth defects. It is very important not to become pregnant   or father a child while undergoing chemotherapy. During chemotherapy and for 48 hours after chemotherapy, the drugs may still be in   your urine and other body fluids. You should use the following precautions to reduce   exposure to others:  * Both men and women should sit on the toilet to cut down on splashing. Flush   toilets with the lid down. Always wash your hands well with soap and water after using the toilet. You may want to use a separate toilet if possible. * Caregivers should wear disposable gloves to handle body wastes. Dispose of   gloves after each use and wash hands. * Any sheets or clothes soiled with bodily fluids should be machine washed separately from other laundry. Wash twice with regular laundry detergent in hot water. If they cannot be washed right away they can be stored in a sealed plastic  bag.    * If disposable adult diapers, underwear, or sanitary pads are used, they can be sealed in a plastic bag, and thrown away with regular trash.

## 2020-11-06 NOTE — ROUTINE PROCESS
Bedside and Verbal shift change report given to aCss (oncoming nurse) by Aston Wen (offgoing nurse). Report included the following information SBAR, Kardex, ED Summary, OR Summary, Intake/Output, MAR, Recent Results and Cardiac Rhythm Sinus Tach.

## 2020-11-06 NOTE — PROGRESS NOTES
11/06/20 0818 Vitals Temp 97.9 °F (36.6 °C) Temp Source Oral  
Pulse (Heart Rate) (!) 101 Heart Rate Source Monitor Resp Rate 22  
O2 Sat (%) 96 % Level of Consciousness Alert  
BP (!) 134/54 MAP (Calculated) 81 Cardiac Rhythm Sinus Tach MEWS Score 3 Patient Observation Patient Turned Turn on right side HR and respiration flunctuates. Pt reported tired, wants to eat, want MD to do procedure soon.

## 2020-11-06 NOTE — PROGRESS NOTES
Reviewed chart and note patient scheduled for PEG tube placement today. Will hold on treatment this date and follow up next week to determine safety to initiate any comfort feeds as her strength, endurance, and respiratory status improve. Thanks. Fara May M.CD.  CCC-SLP

## 2020-11-06 NOTE — PROGRESS NOTES
Hematology-Oncology Progress Note John Prude 
1945 
[id number] 
11/6/2020 Subjective:  
 
GI note reviewed, planned for EGD/PEG today given dysphagia and malnutrition. Son at bedside. Chemotherapy obtained by hospital, consent signed, orders signed, requested transfer to 6th floor oncology unit, case d/w ANGELA Robertson nurse, on allopurinol for TLS prophylaxis X 2 days, uric acid normal today. Allergies: Patient has no known allergies. Current Facility-Administered Medications Medication Dose Route Frequency Provider Last Rate Last Dose  allopurinoL (ZYLOPRIM) tablet 300 mg  300 mg Oral BID Mary Napoles MD   300 mg at 11/06/20 0344  alteplase (CATHFLO) 1 mg in sterile water (preservative free) 1 mL injection  1 mg InterCATHeter PRN Zachary Meyers MD      
 bacitracin 500 unit/gram packet 1 Packet  1 Packet Topical PRN Zachary Meyers MD      
 polyethylene glycol (MIRALAX) packet 17 g  17 g Per NG tube DAILY Hedy Palacio, NP      
 ferrous sulfate 300 mg (60 mg iron)/5 mL oral syrup 300 mg  300 mg Oral DAILY WITH BREAKFAST [place] Mona R NP-C   Stopped at 11/05/20 0800  
 balsam peru-castor oiL (VENELEX) ointment   Topical TID Marc Chauhan DO      
 midodrine (PROAMATINE) tablet 5 mg  5 mg Oral TID WITH MEALS Earljennifer Parada NP   Stopped at 11/04/20 3321  fentaNYL citrate (PF) injection 25-50 mcg  25-50 mcg IntraVENous Q2H PRN JANE PrestonC   50 mcg at 11/02/20 2026  ondansetron (ZOFRAN) injection 4 mg  4 mg IntraVENous Q6H PRN [place] Mona R, NP-C      
 white petrolatum-mineral oiL (AKWA TEARS) 83-15 % ophthalmic ointment   Both Eyes Q12H [place] Mona R NP-C      
 sodium chloride (NS) flush 5-40 mL  5-40 mL IntraVENous Q8H Nahum Stephens MD   10 mL at 11/04/20 0019  
 sodium chloride (NS) flush 5-40 mL  5-40 mL IntraVENous PRN William Salazar MD   20 mL at 11/02/20 0434  acetaminophen (TYLENOL) tablet 650 mg  650 mg Oral Q6H PRN Colton Hidalgo MD      
 Or  
 acetaminophen (TYLENOL) suppository 650 mg  650 mg Rectal Q6H PRN Don Stephens MD      
 
Objective:  
 
Patient Vitals for the past 24 hrs: 
 BP Temp Pulse Resp SpO2 Weight 11/06/20 0917   99     
11/06/20 0818 (!) 134/54 97.9 °F (36.6 °C) (!) 101 22 96 %   
11/06/20 0341 122/60 98.8 °F (37.1 °C) 71 19 100 % 49.5 kg (109 lb 2 oz) 11/05/20 2309 120/68 98.4 °F (36.9 °C) (!) 103 19 100 %   
11/05/20 2058 127/69 98.7 °F (37.1 °C) (!) 112 26 100 %   
11/05/20 1522 (!) 143/70 98.5 °F (36.9 °C) 100 18 100 %   
11/05/20 1110 135/62 98.4 °F (36.9 °C) 96 18 98 %  Gen: NAD HEENT: PERRL, Sclerae anicteric Cv: RRR without m/r/g Pulm: CTA bilaterally Abd: NABS, NTND, No HSM Ext: No c/c/e Available labs reviewed: 
Labs:   
Recent Results (from the past 24 hour(s)) CBC WITH AUTOMATED DIFF Collection Time: 11/06/20  4:22 AM  
Result Value Ref Range WBC 15.5 (H) 3.6 - 11.0 K/uL  
 RBC 3.28 (L) 3.80 - 5.20 M/uL HGB 8.1 (L) 11.5 - 16.0 g/dL HCT 27.6 (L) 35.0 - 47.0 % MCV 84.1 80.0 - 99.0 FL  
 MCH 24.7 (L) 26.0 - 34.0 PG  
 MCHC 29.3 (L) 30.0 - 36.5 g/dL  
 RDW 19.9 (H) 11.5 - 14.5 % PLATELET 40 (LL) 402 - 400 K/uL MPV 9.8 8.9 - 12.9 FL  
 NRBC 0.4 (H) 0  WBC ABSOLUTE NRBC 0.06 (H) 0.00 - 0.01 K/uL NEUTROPHILS 25 (L) 32 - 75 % LYMPHOCYTES 74 (H) 12 - 49 % MONOCYTES 1 (L) 5 - 13 % EOSINOPHILS 0 0 - 7 % BASOPHILS 0 0 - 1 % IMMATURE GRANULOCYTES 0 %  
 ABS. NEUTROPHILS 3.9 1.8 - 8.0 K/UL  
 ABS. LYMPHOCYTES 11.4 (H) 0.8 - 3.5 K/UL  
 ABS. MONOCYTES 0.2 0.0 - 1.0 K/UL  
 ABS. EOSINOPHILS 0.0 0.0 - 0.4 K/UL  
 ABS. BASOPHILS 0.0 0.0 - 0.1 K/UL  
 ABS. IMM. GRANS. 0.0 K/UL  
 DF MANUAL    
 RBC COMMENTS HYPOCHROMIA 1+ 
    
 RBC COMMENTS OVALOCYTES PRESENT 
    
 RBC COMMENTS ANISOCYTOSIS 
1+  WBC COMMENTS SMUDGE CELLS SEEN    
 METABOLIC PANEL, COMPREHENSIVE Collection Time: 11/06/20  4:22 AM  
Result Value Ref Range Sodium 146 (H) 136 - 145 mmol/L Potassium 3.4 (L) 3.5 - 5.1 mmol/L Chloride 115 (H) 97 - 108 mmol/L  
 CO2 25 21 - 32 mmol/L Anion gap 6 5 - 15 mmol/L Glucose 73 65 - 100 mg/dL BUN 39 (H) 6 - 20 MG/DL Creatinine 0.61 0.55 - 1.02 MG/DL  
 BUN/Creatinine ratio 64 (H) 12 - 20 GFR est AA >60 >60 ml/min/1.73m2 GFR est non-AA >60 >60 ml/min/1.73m2 Calcium 8.0 (L) 8.5 - 10.1 MG/DL Bilirubin, total 0.6 0.2 - 1.0 MG/DL  
 ALT (SGPT) 12 12 - 78 U/L  
 AST (SGOT) 26 15 - 37 U/L Alk. phosphatase 83 45 - 117 U/L Protein, total 5.0 (L) 6.4 - 8.2 g/dL Albumin 1.8 (L) 3.5 - 5.0 g/dL Globulin 3.2 2.0 - 4.0 g/dL A-G Ratio 0.6 (L) 1.1 - 2.2 URIC ACID Collection Time: 11/06/20  4:22 AM  
Result Value Ref Range Uric acid 5.2 2.6 - 6.0 MG/DL Assessment and Plan  
 
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 1. MCL: bendamustin/rituximab today. Patient at high risk for TLS. Will check uric acid and CMP daily. Low threshold for rasburicase. 2. Malnutrition: agree with need for PEG. Given her massive splenomegaly and likely sequestration, I doubt that platelet transfusion will raise her PLT count much, but ultimately defer to GI. 3. Malignant pleural effusion: plans for Monday by CTS noted. Thank you for allowing us to participate in the care of this very pleasant patient. Carolee Munoz MD 
Hematology/Oncology Phone [phone]

## 2020-11-06 NOTE — ROUTINE PROCESS
1566:  Oncologist wants pt to transfer to [address] if possibe for chemo treatment. 1020; more venelex to sacrum; mepelex to site. 1125:  Dr. Rande Duane agreeable to transfer to oncology. 1351:  Kenia Davis from oncology voiced Chemo medication just arrived (pharmacy still to mix) PEG/EGD/PleurX drain to be done on Monday instead. Paged team 1 if okay to resume tube feeding. 1455: Will resume tube feeding from last rate of 30 ml /hr 
 
About 1600: Hedy NP with GI call to report to resume feeding.

## 2020-11-06 NOTE — PROGRESS NOTES
11/06/20 1705 Vitals Temp 97.3 °F (36.3 °C) Temp Source Axillary Pulse (Heart Rate) (!) 108 Resp Rate 25  
O2 Sat (%) 100 % Level of Consciousness Alert  
BP (!) 154/67 MAP (Calculated) 96 BP 1 Location Right arm MEWS Score 3  
pt resting comfortably. Extra blanket provided per request.  Plan to get second dose of chemo medication tomorrow as pt per [facility] nurse reports pt fairly tolerating current regiment.

## 2020-11-06 NOTE — PROGRESS NOTES
Thoracic Surgery Simple Progress Note Admit Date: 10/30/2020 POD: 7 Days Post-Op Procedure:  Procedure(s): LEFT VATS PLEURAL BIOPSIES POSSIBLE TALC PLEURODESIS/ 
 
 
Subjective:  
 
Patient has complaints: resting--did not awaken Review of Systems: 
CARDIAC: negative RESP: positive for respiratory failure and left pleural effusion NEURO:  negative INCISION: Clean, dry, and intact EXT: Denies new swelling or pain in the legs or calves Objective:  
 
Blood pressure (!) 134/54, pulse 99, temperature 97.9 °F (36.6 °C), resp. rate 22, height 5' (1.524 m), weight 109 lb 2 oz (49.5 kg), SpO2 96 %. Temp (24hrs), Av.5 °F (36.9 °C), Min:97.9 °F (36.6 °C), Max:98.8 °F (37.1 °C) Hemodynamics PAP 
  CO 
  CI 
 
701 - 1900 In: -  
Out: 270  
1901 - 700 In: 100 [I.V.:100] Out: 1835 [[facility]:0253] EXAM: 
GENERAL: VSS, afrible, alert and cooperative, thin, malnourished HEART:  regular rate and rhythm LUNG: diminished breath sounds bilat bases, L>R, L CT w 390ml/24 hrs output. NEURO:  normal without focal findings 
mental status, speech normal, A&O x3 
INCISION: Clean, dry, and intact EXTREMITIES:No evidence of DVT seen on physical exam. Upper extremities w muscle wasting GI/: Abd soft, nontender with +BM. Voiding w benítez Labs: 
Recent Results (from the past 24 hour(s)) CBC WITH AUTOMATED DIFF Collection Time: 20  4:22 AM  
Result Value Ref Range WBC 15.5 (H) 3.6 - 11.0 K/uL  
 RBC 3.28 (L) 3.80 - 5.20 M/uL HGB 8.1 (L) 11.5 - 16.0 g/dL HCT 27.6 (L) 35.0 - 47.0 % MCV 84.1 80.0 - 99.0 FL  
 MCH 24.7 (L) 26.0 - 34.0 PG  
 MCHC 29.3 (L) 30.0 - 36.5 g/dL  
 RDW 19.9 (H) 11.5 - 14.5 % PLATELET 40 (LL) 872 - 400 K/uL MPV 9.8 8.9 - 12.9 FL  
 NRBC 0.4 (H) 0  WBC ABSOLUTE NRBC 0.06 (H) 0.00 - 0.01 K/uL NEUTROPHILS 25 (L) 32 - 75 % LYMPHOCYTES 74 (H) 12 - 49 % MONOCYTES 1 (L) 5 - 13 % EOSINOPHILS 0 0 - 7 % BASOPHILS 0 0 - 1 % IMMATURE GRANULOCYTES 0 %  
 ABS. NEUTROPHILS 3.9 1.8 - 8.0 K/UL  
 ABS. LYMPHOCYTES 11.4 (H) 0.8 - 3.5 K/UL  
 ABS. MONOCYTES 0.2 0.0 - 1.0 K/UL  
 ABS. EOSINOPHILS 0.0 0.0 - 0.4 K/UL  
 ABS. BASOPHILS 0.0 0.0 - 0.1 K/UL  
 ABS. IMM. GRANS. 0.0 K/UL  
 DF MANUAL    
 RBC COMMENTS HYPOCHROMIA 1+ 
    
 RBC COMMENTS OVALOCYTES PRESENT 
    
 RBC COMMENTS ANISOCYTOSIS 
1+ WBC COMMENTS SMUDGE CELLS SEEN    
METABOLIC PANEL, COMPREHENSIVE Collection Time: 11/06/20  4:22 AM  
Result Value Ref Range Sodium 146 (H) 136 - 145 mmol/L Potassium 3.4 (L) 3.5 - 5.1 mmol/L Chloride 115 (H) 97 - 108 mmol/L  
 CO2 25 21 - 32 mmol/L Anion gap 6 5 - 15 mmol/L Glucose 73 65 - 100 mg/dL BUN 39 (H) 6 - 20 MG/DL Creatinine 0.61 0.55 - 1.02 MG/DL  
 BUN/Creatinine ratio 64 (H) 12 - 20 GFR est AA >60 >60 ml/min/1.73m2 GFR est non-AA >60 >60 ml/min/1.73m2 Calcium 8.0 (L) 8.5 - 10.1 MG/DL Bilirubin, total 0.6 0.2 - 1.0 MG/DL  
 ALT (SGPT) 12 12 - 78 U/L  
 AST (SGOT) 26 15 - 37 U/L Alk. phosphatase 83 45 - 117 U/L Protein, total 5.0 (L) 6.4 - 8.2 g/dL Albumin 1.8 (L) 3.5 - 5.0 g/dL Globulin 3.2 2.0 - 4.0 g/dL A-G Ratio 0.6 (L) 1.1 - 2.2 URIC ACID Collection Time: 11/06/20  4:22 AM  
Result Value Ref Range Uric acid 5.2 2.6 - 6.0 MG/DL Assessment:  
No evidence of DVT. Principal Problem: 
  Acute respiratory failure with hypoxia ([facility] (10/30/2020) Active Problems: 
  Leukocytosis (10/30/2020) Neutropenia ([facility] (10/30/2020) Anemia (10/30/2020) Tachycardia (10/30/2020) Thrombocytopenia ([address] (10/30/2020) SIRS (systemic inflammatory response syndrome) ([address] (10/30/2020) Pleural effusion, left (10/30/2020) Mantle cell lymphoma ([facility] [address] (11/5/2020) PATH:   Mantle cell lymphoma  
Plan/Recommendations:  
Clamp chest tube To OR on Monday 11/9/2020 for PleurX placement NPO after MN Naomi 11/8 See orders Servando Saint, [place] 
11/6/2020

## 2020-11-06 NOTE — PROGRESS NOTES
Bedside shift change report given to Alessio Lewis RN (oncoming nurse) by Macy Yap RN (offgoing nurse). Report included the following information SBAR, Procedure Summary, Intake/Output, MAR, Recent Results and Cardiac Rhythm NSR.

## 2020-11-06 NOTE — PROGRESS NOTES
Problem: Breathing Pattern - Ineffective Goal: *Absence of hypoxia Outcome: Progressing Towards Goal 
  
Problem: Falls - Risk of 
Goal: *Absence of Falls Description: Document Cherry Vargas Fall Risk and appropriate interventions in the flowsheet. Outcome: Progressing Towards Goal 
Note: Fall Risk Interventions: 
Mobility Interventions: Communicate number of staff needed for ambulation/transfer Mentation Interventions: Adequate sleep, hydration, pain control Medication Interventions: Evaluate medications/consider consulting pharmacy Elimination Interventions: Call light in reach, Toileting schedule/hourly rounds Problem: Patient Education: Go to Patient Education Activity Goal: Patient/Family Education Outcome: Progressing Towards Goal 
  
Problem: Nutrition Deficit Goal: *Optimize nutritional status Outcome: Progressing Towards Goal

## 2020-11-07 NOTE — PROGRESS NOTES
[address] [facility]  Hospitalist Group Hospitalist Progress Note Chris Avila MD 
Answering service: [phone] from in house phone Date of Service:  2020 NAME:  John Chavez :  1945 MRN:  [id number] Admission Summary:  
John Chavez is a 70 y.o. female with past medical history of left breast cancer, s/p radiation, and childhood asthma presented to the ED from home with chief complaint of SOB, leg swelling, and weight loss. Symptoms reportedly have been gradual in onset, worsening, now severe, constant, with SOB, LOPEZ, orthopnea. Patient reported no known chronic medical conditions. There were no reports of recent sick contacts or exposure to anyone with COVID 19. On arrival in the ED tonight, initial recorded vital signs were BP = 140/93, HR= 122, RR = 26, O2sat = 89% on room air. Patient was initially placed on 2 lites O2 via nasal cannula (NC). However per ED MD report, patient has becoming increasing dyspneic, with increased supplemental oxygen requirement. Patient had no known prior lung disease. CTA chest revealed a large left pleural effusion with left lung atelectasis. Labs revealed WBC = 27.2, neutrophils = 2%, and absolute neutrophil count = 0.5. Hemoglobin = 6.4 (with no comparison lab available for review). ED offered PRBC transfusion but patient reportedly refused. She was started on Levofloxacin 750 mg IV for antibiotic coverage. Patient is now seen for admission to the hospitalist service. Interval history / Subjective: Follow up SOB, acute hypoxic respiratory failure Patient seen and examined at the bedside. Labs, images and notes reviewed Discussed with nursing staff, orders reviewed. Plan discussed with patient/Family Feeling okay. Appears weak and frail.   Denied any pain, nausea, vomiting, discomfort, difficulty breathing or congestion. To get PEG today. Postponed till Monday due to initiation of chemotherapy today. No other concerns or overnight events. Assessment & Plan: #Acute hypoxic respiratory failure s/p intubation with mechanical ventilation. Now extubated and tolerating well. Currently on O2 5 LPM NC #Large malignant left pleural effusion s/p VATS with CT placement by thoracic surgery, nonresolving. Failed talc pleurodesis. Thoracic surgery on board #Symptomatic anemia secondary to mild blood loss at CT site, s/p PRBC transfusion #Dysphagia with high aspiration risk per  Stage IV mantle cell lymphoma #Acute on chronic thrombocytopenia, s/p platelets, due to malignancy #Splenomegaly with retroperitoneal adenopathy on CT A/P, concern for lymphoproliferative disorder 
 
 
-Admitted to ICU, improved and subsequently transferred to [address] [place] [address] on board 
-Chemotherapy initiated 11/6 
-IVF initiated by heme-onc for tumor lysis syndrome prevention 
-CXR monitoring -PEG tube placement on Monday. N.p.o. after MN on Sunday night 
-Resume tube feeds through Dobbhoff tube 
-Thoracic surgery on board 
-Plan for CT removal along with Pleurx catheter placement on Monday 11/9 
-PEG tube placement as well as Pleurx catheter placement would require coordination on Monday between GI and thoracic surgery. - on board, failed swallow eval, high aspiration risk 
-GI consult for PEG tube placement (could not be done 11/6 sec to chemotherapy) 
-Dobbhoff tube placement 
-Initiate tube feedings per nutrition team recommendations 
-Strict I/os 
-Hodges catheter for the same. If remains stable DC in 24-48 hours -PRBC/platelet transfusion as needed, goals: Hb <7, platelet <25 
-Supportive carebreathing treatment, pain management 
-Palliative care on board. GOC with curative intent. Code status: Full code DVT prophylaxis: SCDs Addendum:Spoke to the son and discussed about the code status. Gave him information to the best of my knowledge. He seems to understand and is probably leaning towards making the patient DNR but will talk to his girlfriend and then let me know. For now keep the patient FULL CODE Care Plan discussed with: Patient/Family and Nurse Anticipated Disposition: TBD Anticipated Discharge: Greater than 48 hours Hospital Problems  Date Reviewed: 10/30/2020 Codes Class Noted POA Mantle cell lymphoma ([facility] ICD-10-CM: C83.10 ICD-9-CM: 200.40  11/5/2020 Unknown Leukocytosis ICD-10-CM: N79.489 ICD-9-CM: 288.60  10/30/2020 Unknown Neutropenia ([facility] ICD-10-CM: D70.9 ICD-9-CM: 288.00  10/30/2020 Unknown Anemia ICD-10-CM: D64.9 ICD-9-CM: 285.9  10/30/2020 Unknown Tachycardia ICD-10-CM: R00.0 ICD-9-CM: 785.0  10/30/2020 Unknown Thrombocytopenia ([facility] ICD-10-CM: D69.6 ICD-9-CM: 287.5  10/30/2020 Unknown SIRS (systemic inflammatory response syndrome) (H[facility]) ICD-10-CM: R65.10 ICD-9-CM: 995.90  10/30/2020 Unknown Pleural effusion, left ICD-10-CM: J90 ICD-9-CM: 511.9  10/30/2020 Unknown * (Principal) Acute respiratory failure with hypoxia ([facility] ICD-10-CM: J96.01 
ICD-9-CM: 518.81  10/30/2020 Yes Review of Systems: A comprehensive review of systems was negative except for that written in the HPI. Vital Signs:  
 Last 24hrs VS reviewed since prior progress note. Most recent are: 
Visit Vitals BP (!) 116/48 (BP 1 Location: Right arm, BP Patient Position: At rest) Pulse 96 Temp 97.8 °F (36.6 °C) Resp 17 Ht 5' (1.524 m) Wt 49.8 kg (109 lb 12.6 oz) SpO2 100% BMI 21.44 kg/m² Intake/Output Summary (Last 24 hours) at 11/7/2020 4856 Last data filed at 11/7/2020 6060 Gross per 24 hour Intake 2987.58 ml Output 1267 ml Net 1720.58 ml Physical Examination: I had a face to face encounter with this patient and independently examined them on 11/7/2020 as outlined below: 
 
     
General:          Alert, cooperative, mild distress, appears stated age. Frail and weak HEENT:           Atraumatic, anicteric sclerae, pink conjunctivae No oral ulcers, mucosa dry, throat clear, dentition fair Neck:               Supple, symmetrical 
Lungs:             Clear to auscultation bilaterally. No Wheezing or Rhonchi. No rales. Chest wall:      No tenderness  No Accessory muscle use. Left subclavian central line in place Heart:              Regular  rhythm,  No  murmur   No edema Abdomen:        Soft, non-tender. Not distended. Bowel sounds normal 
Extremities:     No cyanosis. No clubbing,   
                        Skin turgor normal, Capillary refill normal 
Skin:                Not pale. Not Jaundiced  No rashes Psych:             Not anxious or agitated. Neurologic:      Alert, moves all extremities, answers questions appropriately and responds to commands Data Review:  
 Review and/or order of clinical lab test 
Review and/or order of tests in the radiology section of CPT Review and/or order of tests in the medicine section of CPT Radiology Xr Abd (kub) Result Date: 11/5/2020 IMPRESSION: Non-obstructive bowel gas pattern. . Feeding tube tip right upper quadrant, possibly projecting over the duodenal bulb. Xr Abd (kub) Result Date: 10/30/2020 IMPRESSION: 1. Nasogastric tube in satisfactory position 2. Massive splenomegaly Cta Chest W Or W Wo Cont Result Date: 10/30/2020 IMPRESSION: There is no pulmonary embolism. There is no aortic aneurysm or dissection. Large left-sided pleural effusion with left lung atelectasis. Left to right midline shift. There is mediastinal and hilar adenopathy which is extensive. Neoplastic etiology should be considered. Ct Abd Pelv Wo Cont Result Date: 10/30/2020 IMPRESSION: There is massive splenomegaly with extensive retroperitoneal adenopathy. Findings are consistent with lymphoproliferative disorder. Xr Chest [place] Result Date: 11/6/2020 IMPRESSION: 1. Questionable trace left apical pneumothorax, with stable positioning of left apical chest tube. 2. Mildly improved bilateral airspace disease and pleural effusions. Xr Chest [place] Result Date: 11/3/2020 IMPRESSION: Stable exam. No evidence for pneumothorax Xr Chest [place] Result Date: 11/2/2020 IMPRESSION: Left chest tube remains in good position. Small left pleural effusion. No pneumothorax. Increased bilateral pulmonary edema versus pneumonia. Xr Chest [place] Result Date: 10/31/2020 IMPRESSION: Left-sided central venous access catheter without pneumothorax. Catheter in appropriate position for use. Extensive left lung parenchymal opacity with improved left-sided effusion. Interval left pleural drainage catheter placement. Mediastinal and hilar adenopathy with left hilar mass lesion/pneumonia. Cardiomegaly. Schneider Crisp Chest [place] Result Date: 10/30/2020 IMPRESSION: 1. Decreased size of left-sided pleural effusion post chest tube placement 2. Persistent bilateral airspace disease. Prominence in the region of the aortic knob Xr Chest [place] Result Date: 10/30/2020 IMPRESSION: Extensive left lung parenchymal opacity with parapneumonic effusion. Mediastinal adenopathy is suspected. Cardiomegaly. .  
 
 
Labs:  
 
Recent Labs 11/07/20 0402 11/06/20 0422 WBC SPECIMEN INTEGRITY QUESTIONABLE. SUGGEST RECOLLECTION 15.5* HGB SPECIMEN INTEGRITY QUESTIONABLE. SUGGEST RECOLLECTION 8.1* HCT SPECIMEN INTEGRITY QUESTIONABLE. SUGGEST RECOLLECTION 27.6* PLT SPECIMEN INTEGRITY QUESTIONABLE. SUGGEST RECOLLECTION 40* Recent Labs 11/07/20 0402 11/06/20 
0422 11/05/20 
0530 * 146*  --   
K 3.5 3.4*  --   
* 115*  --   
CO2 22 25  --   
BUN 38* 39*  --   
 CREA 0.69 0.61  --   
GLU 93 73  --   
CA 7.1* 8.0*  --   
URICA 4.5 5.2 5.1 Recent Labs 11/07/20 
0402 11/06/20 
0422 ALT 14 12 AP 81 83 TBILI 0.3 0.6 TP 4.5* 5.0* ALB 1.6* 1.8*  
GLOB 2.9 3.2 No results for input(s): INR, PTP, APTT, INREXT, INREXT in the last 72 hours. No results for input(s): FE, TIBC, PSAT, FERR in the last 72 hours. Lab Results Component Value Date/Time Folate 15.5 10/31/2020 06:47 PM  
  
No results for input(s): PH, PCO2, PO2 in the last 72 hours. No results for input(s): CPK, CKNDX, TROIQ in the last 72 hours. No lab exists for component: CPKMB No results found for: CHOL, CHOLX, CHLST, CHOLV, HDL, HDLP, LDL, LDLC, DLDLP, TGLX, TRIGL, TRIGP, CHHD, CHHDX Lab Results Component Value Date/Time Glucose (POC) 102 (H) 10/31/2020 06:54 PM  
 
No results found for: COLOR, APPRN, SPGRU, REFSG, TONNY, PROTU, GLUCU, KETU, BILU, UROU, ALEX, LEUKU, GLUKE, EPSU, BACTU, WBCU, RBCU, CASTS, UCRY Medications Reviewed:  
 
Current Facility-Administered Medications Medication Dose Route Frequency  0.9% sodium chloride infusion  100 mL/hr IntraVENous CONTINUOUS  
 0.9% sodium chloride infusion  25 mL/hr IntraVENous CONTINUOUS  
 dexamethasone (DECADRON) 4 mg/mL injection 10 mg  10 mg IntraVENous Q24H  
 bendamustine 132.5 mg in 0.9% sodium chloride 50 mL, overfill volume 5 mL chemo infusion  90 mg/m2 (Treatment Plan Recorded) IntraVENous Q24H  
 ondansetron (ZOFRAN) injection 8 mg  8 mg IntraVENous Q24H  
 allopurinoL (ZYLOPRIM) tablet 300 mg  300 mg Oral BID  alteplase (CATHFLO) 1 mg in sterile water (preservative free) 1 mL injection  1 mg InterCATHeter PRN  
 bacitracin 500 unit/gram packet 1 Packet  1 Packet Topical PRN  polyethylene glycol (MIRALAX) packet 17 g  17 g Per NG tube DAILY  ferrous sulfate 300 mg (60 mg iron)/5 mL oral syrup 300 mg  300 mg Oral DAILY WITH BREAKFAST  balsam peru-castor oiL (VENELEX) ointment   Topical TID  midodrine (PROAMATINE) tablet 5 mg  5 mg Oral TID WITH MEALS  fentaNYL citrate (PF) injection 25-50 mcg  25-50 mcg IntraVENous Q2H PRN  
 ondansetron (ZOFRAN) injection 4 mg  4 mg IntraVENous Q6H PRN  
 white petrolatum-mineral oiL (AKWA TEARS) 83-15 % ophthalmic ointment   Both Eyes Q12H  
 sodium chloride (NS) flush 5-40 mL  5-40 mL IntraVENous Q8H  
 sodium chloride (NS) flush 5-40 mL  5-40 mL IntraVENous PRN  
 acetaminophen (TYLENOL) tablet 650 mg  650 mg Oral Q6H PRN Or  
 acetaminophen (TYLENOL) suppository 650 mg  650 mg Rectal Q6H PRN  
 
______________________________________________________________________ EXPECTED LENGTH OF STAY: 7d 21h ACTUAL LENGTH OF STAY:          8 Kimberly Diehl MD

## 2020-11-07 NOTE — PROGRESS NOTES
11/06/20 1915 Vitals Temp 97.1 °F (36.2 °C) Temp Source Oral  
Pulse (Heart Rate) (!) 118 Heart Rate Source Monitor Resp Rate 24  
O2 Sat (%) 100 % Level of Consciousness Alert  
BP (!) 118/55 MAP (Calculated) 76  
Cardiac Rhythm NSR  
MEWS Score 4 Pain 1 Pain Scale 1 Numeric (0 - 10) Pain Intensity 1 0 Patient Stated Pain Goal 2 POSS Scale Opioid Sedation Scale 1 Patient Observation Observations pt moving and changing TV station Activity Family/Visitors present; Watching t.v.  
pending lasix order. Pt alert and oriented; changing TV channels, denies pain.

## 2020-11-07 NOTE — PROGRESS NOTES
Administering chemotherapy reviewing labs and orders - putting in verbal orders per Dr Jonathan Contreras

## 2020-11-07 NOTE — PROGRESS NOTES
Problem: Breathing Pattern - Ineffective Goal: *Absence of hypoxia Outcome: Progressing Towards Goal 
  
Problem: Falls - Risk of 
Goal: *Absence of Falls Description: Document Jamey Hicks Fall Risk and appropriate interventions in the flowsheet. Outcome: Progressing Towards Goal 
Note: Fall Risk Interventions: 
Mobility Interventions: Patient to call before getting OOB, Utilize walker, cane, or other assistive device Mentation Interventions: Adequate sleep, hydration, pain control Medication Interventions: Evaluate medications/consider consulting pharmacy Elimination Interventions: Call light in reach Problem: Nutrition Deficit Goal: *Optimize nutritional status Outcome: Not Progressing Towards Goal

## 2020-11-07 NOTE — PROGRESS NOTES
Hematology-Oncology Progress Note Vanesa Oliver 
1945 
[id number] 
11/7/2020 Subjective:  
 
Opens eyes but not answering questions. Nurse reports that she was a little more alert yesterday. Feeding tube pulled out and has been replaced. No evidence of bleeding. Allergies: Patient has no known allergies. Current Facility-Administered Medications Medication Dose Route Frequency Provider Last Rate Last Dose  
 0.9% sodium chloride infusion 250 mL  250 mL IntraVENous PRN Tutu Aguilar MD      
 0.9% sodium chloride infusion  100 mL/hr IntraVENous CONTINUOUS Mike Cifuentes  mL/hr at 11/06/20 2120 100 mL/hr at 11/06/20 2120  
 0.9% sodium chloride infusion  25 mL/hr IntraVENous CONTINUOUS Mike Cifuentes MD   Stopped at 11/06/20 2252  dexamethasone (DECADRON) 4 mg/mL injection 10 mg  10 mg IntraVENous Q24H Mike Cifuentes MD   10 mg at 11/07/20 1317  
 bendamustine 132.5 mg in 0.9% sodium chloride 50 mL, overfill volume 5 mL chemo infusion  90 mg/m2 (Treatment Plan Recorded) IntraVENous Q24H Mike Cifuentes .8 mL/hr at 11/07/20 1323 132.5 mg at 11/07/20 1323  ondansetron (ZOFRAN) injection 8 mg  8 mg IntraVENous Q24H Mike Cifuentes MD   8 mg at 11/07/20 1322  allopurinoL (ZYLOPRIM) tablet 300 mg  300 mg Oral BID Mike Cifuentes MD   Stopped at 11/07/20 8152  alteplase (CATHFLO) 1 mg in sterile water (preservative free) 1 mL injection  1 mg InterCATHeter PRN Miky Aleman MD      
 bacitracin 500 unit/gram packet 1 Packet  1 Packet Topical PRN Miky Aleman MD      
 polyethylene glycol (MIRALAX) packet 17 g  17 g Per NG tube DAILY Ivan Garcia NP   Stopped at 11/07/20 0900  ferrous sulfate 300 mg (60 mg iron)/5 mL oral syrup 300 mg  300 mg Oral DAILY WITH BREAKFAST BEV Taylor   Stopped at 11/07/20 0800  
 balsam peru-castor oiL (VENELEX) ointment   Topical TID Ratzlaff, Marc A, DO      
  midodrine (PROAMATINE) tablet 5 mg  5 mg Oral TID WITH MEALS Alex Brush, TREY   Stopped at 11/04/20 4923  fentaNYL citrate (PF) injection 25-50 mcg  25-50 mcg IntraVENous Q2H PRN Lee Situ, NP-C   50 mcg at 11/02/20 2026  ondansetron (ZOFRAN) injection 4 mg  4 mg IntraVENous Q6H PRN Lee Situ, NP-C      
 white petrolatum-mineral oiL (AKWA TEARS) 83-15 % ophthalmic ointment   Both Eyes Q12H Tyesha Latisha R, NP-C      
 sodium chloride (NS) flush 5-40 mL  5-40 mL IntraVENous Q8H Ayse Deluna MD   10 mL at 11/07/20 1322  sodium chloride (NS) flush 5-40 mL  5-40 mL IntraVENous PRN Ayse Deluna MD   10 mL at 11/06/20 1014  acetaminophen (TYLENOL) tablet 650 mg  650 mg Oral Q6H PRN Ayse Deluna MD      
 Or  
 acetaminophen (TYLENOL) suppository 650 mg  650 mg Rectal Q6H PRN Deborah Stephens MD      
 
Objective:  
 
Patient Vitals for the past 24 hrs: 
 BP Temp Pulse Resp SpO2 Weight 11/07/20 1353 (!) 93/34  (!) 109 15    
11/07/20 1343 (!) 93/33  (!) 101 16    
11/07/20 1330 (!) 89/39 97.2 °F (36.2 °C) (!) 103 16 100 %   
11/07/20 1315 (!) 94/50 (!) 96.1 °F (35.6 °C) 98 17 99 %   
11/07/20 1300 (!) 100/38  90 16    
11/07/20 1251 (!) 103/55 98 °F (36.7 °C) 90 17 100 %   
11/07/20 1233 (!) 104/54  96 23    
11/07/20 1216 (!) 90/44 97.2 °F (36.2 °C) 88 19    
11/07/20 1024   91 22    
11/07/20 1017   94 16    
11/07/20 0700 (!) 116/48 97.8 °F (36.6 °C) 96 17 100 %   
11/07/20 0600 103/61 97.9 °F (36.6 °C) (!) 101 20 100 %   
11/07/20 0500 (!) 106/59  96 19 99 %   
11/07/20 0341 122/62 97.5 °F (36.4 °C) (!) 104 20 98 % 49.8 kg (109 lb 12.6 oz) 11/07/20 0100 (!) 122/57  (!) 123 24 100 %   
11/06/20 2330 124/62 97.2 °F (36.2 °C) (!) 116 20 98 %   
11/06/20 2200 116/76  (!) 125 24 99 %   
11/06/20 2132 128/65  (!) 118 18 100 %   
11/06/20 1945 118/61 97.3 °F (36.3 °C) (!) 116 24   11/06/20 1915 (!) 118/55 97.1 °F (36.2 °C) (!) 118 24 100 %   
11/06/20 1832 (!) 116/49  (!) 111 24 100 %   
11/06/20 1811 (!) 101/57 97.2 °F (36.2 °C) (!) 115 22 100 %   
11/06/20 1747 113/70 97.5 °F (36.4 °C) (!) 109 23 100 %   
11/06/20 1705 (!) 154/67 97.3 °F (36.3 °C) (!) 108 25 100 %   
11/06/20 1631 124/80 97.8 °F (36.6 °C) (!) 106 23 100 %   
11/06/20 1610 (!) 144/64 97.9 °F (36.6 °C) (!) 105 28 99 %   
11/06/20 1556 (!) 157/83  98 23 94 %   
11/06/20 1554  97.4 °F (36.3 °C)   99 %   
11/06/20 1546   96 23    
11/06/20 1532 (!) 142/73       
11/06/20 1503 (!) 146/76 98.1 °F (36.7 °C) 90 18 98 %  Gen: ill appearing with bitemporal wasting, somnolent HEENT:  Sclerae anicteric Cv: RRR without m/r/g Pulm: aerating well Abd: NABS, NTND, No HSM Ext: 2+ edema Available labs reviewed: 
Labs:   
Recent Results (from the past 24 hour(s)) CBC WITH AUTOMATED DIFF Collection Time: 11/07/20  4:02 AM  
Result Value Ref Range WBC  K/uL SPECIMEN INTEGRITY QUESTIONABLE. SUGGEST RECOLLECTION  
 RBC  M/uL SPECIMEN INTEGRITY QUESTIONABLE. SUGGEST RECOLLECTION  
 HGB  12.0 - 16.0 g/dL SPECIMEN INTEGRITY QUESTIONABLE. SUGGEST RECOLLECTION  
 HCT  % SPECIMEN INTEGRITY QUESTIONABLE. SUGGEST RECOLLECTION  
 MCV  78.0 - 102.0 FL  
  SPECIMEN INTEGRITY QUESTIONABLE. SUGGEST RECOLLECTION  
 MCH  25.0 - 35.0 PG  
  SPECIMEN INTEGRITY QUESTIONABLE. SUGGEST RECOLLECTION  
 MCHC  31.0 - 37.0 g/dL SPECIMEN INTEGRITY QUESTIONABLE. SUGGEST RECOLLECTION  
 RDW  11.5 - 14.5 % SPECIMEN INTEGRITY QUESTIONABLE. SUGGEST RECOLLECTION  
 PLATELET  K/uL SPECIMEN INTEGRITY QUESTIONABLE. SUGGEST RECOLLECTION  
 MPV  7.4 - 10.4 FL  
  SPECIMEN INTEGRITY QUESTIONABLE. SUGGEST RECOLLECTION  
 NRBC  0  WBC SPECIMEN INTEGRITY QUESTIONABLE. SUGGEST RECOLLECTION ABSOLUTE NRBC  K/uL SPECIMEN INTEGRITY QUESTIONABLE. SUGGEST RECOLLECTION  
 NEUTROPHILS  42 - 75 % SPECIMEN INTEGRITY QUESTIONABLE. SUGGEST RECOLLECTION  
 LYMPHOCYTES  12 - 49 % SPECIMEN INTEGRITY QUESTIONABLE. SUGGEST RECOLLECTION  
 MONOCYTES  5 - 13 % SPECIMEN INTEGRITY QUESTIONABLE. SUGGEST RECOLLECTION  
 EOSINOPHILS  0 - 5 % SPECIMEN INTEGRITY QUESTIONABLE. SUGGEST RECOLLECTION  
 BASOPHILS  0 - 1 % SPECIMEN INTEGRITY QUESTIONABLE. SUGGEST RECOLLECTION  
 IMMATURE GRANULOCYTES  0.0 - 5.0 % SPECIMEN INTEGRITY QUESTIONABLE. SUGGEST RECOLLECTION  
 ABS. NEUTROPHILS  K/UL SPECIMEN INTEGRITY QUESTIONABLE. SUGGEST RECOLLECTION  
 ABS. LYMPHOCYTES  K/UL SPECIMEN INTEGRITY QUESTIONABLE. SUGGEST RECOLLECTION  
 ABS. MONOCYTES  K/UL SPECIMEN INTEGRITY QUESTIONABLE. SUGGEST RECOLLECTION  
 ABS. EOSINOPHILS  K/UL SPECIMEN INTEGRITY QUESTIONABLE. SUGGEST RECOLLECTION  
 ABS. BASOPHILS  K/UL SPECIMEN INTEGRITY QUESTIONABLE. SUGGEST RECOLLECTION  
 ABS. IMM. GRANS.  0.0 - 0.5 K/UL SPECIMEN INTEGRITY QUESTIONABLE. SUGGEST RECOLLECTION  
 DF     
  SPECIMEN INTEGRITY QUESTIONABLE. SUGGEST RECOLLECTION  
METABOLIC PANEL, COMPREHENSIVE Collection Time: 11/07/20  4:02 AM  
Result Value Ref Range Sodium 149 (H) 136 - 145 mmol/L Potassium 3.5 3.5 - 5.1 mmol/L Chloride 118 (H) 97 - 108 mmol/L  
 CO2 22 21 - 32 mmol/L Anion gap 9 5 - 15 mmol/L Glucose 93 65 - 100 mg/dL BUN 38 (H) 6 - 20 MG/DL Creatinine 0.69 0.55 - 1.02 MG/DL  
 BUN/Creatinine ratio 55 (H) 12 - 20 GFR est AA >60 >60 ml/min/1.73m2 GFR est non-AA >60 >60 ml/min/1.73m2 Calcium 7.1 (L) 8.5 - 10.1 MG/DL Bilirubin, total 0.3 0.2 - 1.0 MG/DL  
 ALT (SGPT) 14 12 - 78 U/L  
 AST (SGOT) 116 (H) 15 - 37 U/L Alk. phosphatase 81 45 - 117 U/L Protein, total 4.5 (L) 6.4 - 8.2 g/dL Albumin 1.6 (L) 3.5 - 5.0 g/dL Globulin 2.9 2.0 - 4.0 g/dL A-G Ratio 0.6 (L) 1.1 - 2.2 URIC ACID Collection Time: 11/07/20  4:02 AM  
Result Value Ref Range  Uric acid 4.5 2.6 - 6.0 MG/DL  
 HEPATITIS C AB Collection Time: 11/07/20  4:02 AM  
Result Value Ref Range Hep C virus Ab Interp. NONREACTIVE NR Hep C  virus Ab comment Method used is [facility] TYPE & SCREEN Collection Time: 11/07/20  4:02 AM  
Result Value Ref Range Crossmatch Expiration 11/10/2020,2359 ABO/Rh(D) A POSITIVE Antibody screen NEG   
CBC WITH AUTOMATED DIFF Collection Time: 11/07/20  7:15 AM  
Result Value Ref Range WBC 5.4 3.6 - 11.0 K/uL  
 RBC 2.95 (L) 3.80 - 5.20 M/uL HGB 7.3 (L) 11.5 - 16.0 g/dL HCT 25.6 (L) 35.0 - 47.0 % MCV 86.8 80.0 - 99.0 FL  
 MCH 24.7 (L) 26.0 - 34.0 PG  
 MCHC 28.5 (L) 30.0 - 36.5 g/dL  
 RDW 19.8 (H) 11.5 - 14.5 % PLATELET 6 (LL) 979 - 400 K/uL NRBC 1.7 (H) 0  WBC ABSOLUTE NRBC 0.09 (H) 0.00 - 0.01 K/uL NEUTROPHILS 44 32 - 75 % BAND NEUTROPHILS 4 0 - 6 % LYMPHOCYTES 49 12 - 49 % MONOCYTES 3 (L) 5 - 13 % EOSINOPHILS 0 0 - 7 % BASOPHILS 0 0 - 1 % IMMATURE GRANULOCYTES 0 %  
 ABS. NEUTROPHILS 2.6 1.8 - 8.0 K/UL  
 ABS. LYMPHOCYTES 2.6 0.8 - 3.5 K/UL  
 ABS. MONOCYTES 0.2 0.0 - 1.0 K/UL  
 ABS. EOSINOPHILS 0.0 0.0 - 0.4 K/UL  
 ABS. BASOPHILS 0.0 0.0 - 0.1 K/UL  
 ABS. IMM. GRANS. 0.0 K/UL  
 DF MANUAL    
 RBC COMMENTS OVALOCYTES PRESENT 
    
 RBC COMMENTS HYPOCHROMIA 1+ 
    
 RBC COMMENTS ANISOCYTOSIS 
1+ PLATELETS, ALLOCATE Collection Time: 11/07/20  9:30 AM  
Result Value Ref Range Unit number [id number] Blood component type PLPH LR,PAS3 Unit division 00 Status of unit ISSUED Assessment and Plan  
 
71 y/o woman with newly diagnosed stage IV mantle cell lymphoma. 1. MCL: S/p rituxan 11/6 . First bendamustine today. Continue allopurinol to reduce the risk of tumor lysis syndrome. No evidence of this. Dr. Natalee Muniz has had discussions with patient and family regarding goals of care. Her performance status is poor and likely poor prognosis.   Will continue current plan for now. Discussed with Etienne Ennis 2. T'cytopenia: secondary to #1 above. Transfuse 1 pack of platelets today. 3. Leukocytosis: please note that diff reveals the majority of her WBCs are lymphocytes, most likely due to the leukemic phase of her MCL and likely does not represent infection. Shayy Mccoy MD 
Hematology/Oncology Phone [phone]

## 2020-11-07 NOTE — PROGRESS NOTES
11/07/20 1330 Vitals Temp 97.2 °F (36.2 °C) Temp Source Axillary Pulse (Heart Rate) (!) 103 Heart Rate Source Monitor Resp Rate 16 Level of Consciousness Responds to Voice BP (!) 89/39 
(after chemo infusion) MAP (Calculated) (!) 56 BP 1 Location Right arm BP 1 Method Automatic  
BP Patient Position Post activity;Lying left side MEWS Score 4 Patient Observation Patient Turned Turn on left side Observations reposition up in bed. during chemo infusion

## 2020-11-07 NOTE — ROUTINE PROCESS
Bedside and Verbal shift change report given to Aracely (oncoming nurse) by Jesica Casey (offgoing nurse). Report included the following information Kardex, Intake/Output, MAR, Recent Results and Cardiac Rhythm NSR/NST.

## 2020-11-07 NOTE — PROGRESS NOTES
EGD/PEG could not be done today as patient had to receive chemotherapy. PEG will be placed on Monday by Dr Tomi Hayward. OK to resume tube feedings. Hold tube feedings after midnight Sunday.

## 2020-11-07 NOTE — PROGRESS NOTES
l 
 
 11/07/20 1216 Vitals Temp 97.2 °F (36.2 °C) Temp Source Axillary Pulse (Heart Rate) 88 Heart Rate Source Monitor Heart Rate (Monitor) 100 Resp Rate 19 Level of Consciousness Responds to Voice (opens eyes when called her name but return to sleep) BP (!) 90/44 MAP (Calculated) (!) 59 BP 1 Location Right arm BP 1 Method Automatic Cardiac Rhythm NSR  
MEWS Score 3 Pain 1 Pain Scale 1 Numeric (0 - 10) Pain Intensity 1 0  
POSS Scale Opioid Sedation Scale 0 Oxygen Therapy O2 Device Nasal cannula O2 Flow Rate (L/min) 2 l/min Patient Observation Patient Turned Turn on right side  
will administer Midodrin once feeding tube line confirmed placement.

## 2020-11-07 NOTE — PROGRESS NOTES
Bedside shift change report given to ZAMZAM Odell (oncoming nurse) by Janki Hahn RN (offgoing nurse). Report included the following information SBAR, Kardex, MAR, Accordion and Cardiac Rhythm NSR.

## 2020-11-07 NOTE — ROUTINE PROCESS
0825:  Paged Team 1 for critical platelet levels and per report pt had pull out feeding tube. Plan to ask if patient can have TPN vs another feeding tube. 7400:  Notified Dr. Majo Urbina about platlets 6000. Asked Dr. Majo Urbina to address code status with patient and family. Pt is fading. Made him aware pt pull feeding tube out of nare. He voiced to replace feeding tube. He voiced he will re-discussed code status with family. Will paged Dr. Francis Beat call to let know of low platelets. About 0935; Kenya Zuniga voiced she contacted oncologist about low platelets and received order to transfuse platelets and if plt >10,000; plan to do the chemo treatment. 0945:  Blood bank voiced she had to order platelets. Will arrive about one hour. She voice she will call when she receives it. Dr. Lg Salcido notified of the later arrival of platelets. No new orders obtain. Prepare supervisor and RRT that patient is deteriorating. 1016: pt woke up during the feeding tube placement but return to sleep after it was done. 1215:  Pt able to tolerate few licks of orange ice pt wanted. 1234:  KUB result still pending. 1030: Paged Dr. Majo Urbina if he spoke to family about code status and will update him pt will get plt as soon as it arrives to blood bank. 1044:  Dr. Majo Urbina on unit; paged Dr. Lg Salcido for him. After speaking with Ijeoma Perez, Dr. Majo Urbina voiced plan to continue with chemo. 1102:  Called blood bank to check on platelets. I was informed it has not arrived. She will call Blood Bank to check on it. About 1125; PCXR at bedside. Pt's eyes more alert. Pt continue to response with shaking or nodding her head but does not verbalize today. 1313:  KUB pending. Per Anabel Arvizu RN voiced  No need to draw platelets post platelets transfusion per MD as chemo medication needs to be administered. About 1350 advance feeting to 70 cm marking. Repeat CXR order. Once in place will resume feeding and give midodrine. 1550:  Call Juan,patient's son to come see patient. Patient does deteriorating. I answered his questions that patient did finish her Chemo medication earlier. Spoke with  as pt is not getting any better. Concern she is slowing dying. Ask him to please talk to family about DNR or transfer pt. He voiced to keep patient on the unit for now that he will call family. I let him know that I call son Prince Pena to let him know she is not doing well. Supervisor, Holden Wilhelm, has been notified of situation.

## 2020-11-07 NOTE — ROUTINE PROCESS
About 1640:  Dr. Shantelle Lou called to update Vesna Oliver to talk with his girlfriend to decide on code status of DNR. About 1700:  Update Juan Rosario's fiance per Vesna Benito Mariscal voice to let her know anything she ask. 
 
8056:  Spoke with Dr. Shantelle Lou, asked him to transfer pt as Vesna Oliver voice he wants pt to be full code. He voiced he will have someone from ICU to evaluate her. Tammy Foote from CCU/RRT on unit and came to assess pt. Pt most alert when ICU physician came to assess pt. Son at bedside. 1830:  Pt returns to somnolence appearance again. Reposition up in bed. 
1905:  Calling report to ICU 9. About 1930 Pt settled into ICU 9.  Pt has her: eye ointment, venelex, black rosary with reddish string, silver color rosary and a silver color necklace with pendent. Pt has her cell phone/speaker, chargers and extension cord, black pen, square shape black canvas like case

## 2020-11-07 NOTE — PROGRESS NOTES
Consult received  Patient is being followed by Dr. Cintia Kyle at [address]    Thank you    Alla Mckeon MD, [address] [facility]

## 2020-11-07 NOTE — PROGRESS NOTES
[facility] Adult  Hospitalist Group Hospitalist Progress Note Author MD Gonzalo 
Answering service: [phone] OR [phone] from in house phone Date of Service:  2020 NAME:  Dianna Morales :  1945 MRN:  [id number] Admission Summary:  
Dianna Morales is a 70 y.o. female with past medical history of left breast cancer, s/p radiation, and childhood asthma presented to the ED from home with chief complaint of SOB, leg swelling, and weight loss. Symptoms reportedly have been gradual in onset, worsening, now severe, constant, with SOB, LOPEZ, orthopnea. Patient reported no known chronic medical conditions. There were no reports of recent sick contacts or exposure to anyone with COVID 19. On arrival in the ED tonight, initial recorded vital signs were BP = 140/93, HR= 122, RR = 26, O2sat = 89% on room air. Patient was initially placed on 2 lites O2 via nasal cannula (NC). However per ED MD report, patient has becoming increasing dyspneic, with increased supplemental oxygen requirement. Patient had no known prior lung disease. CTA chest revealed a large left pleural effusion with left lung atelectasis. Labs revealed WBC = 27.2, neutrophils = 2%, and absolute neutrophil count = 0.5. Hemoglobin = 6.4 (with no comparison lab available for review). ED offered PRBC transfusion but patient reportedly refused. She was started on Levofloxacin 750 mg IV for antibiotic coverage. Patient is now seen for admission to the hospitalist service. Interval history / Subjective: Follow up SOB, acute hypoxic respiratory failure Patient seen and examined at the bedside. Labs, images and notes reviewed Discussed with nursing staff, orders reviewed. Plan discussed with patient/Family Feeling okay. Appears weak and frail.   Denied any pain, nausea, vomiting, discomfort, difficulty breathing or congestion. To get PEG today. Postponed till Monday due to initiation of chemotherapy today. No other concerns or overnight events. Assessment & Plan: #Acute hypoxic respiratory failure s/p intubation with mechanical ventilation. Now extubated and tolerating well. Currently on O2 5 LPM NC #Large malignant left pleural effusion s/p VATS with CT placement by thoracic surgery, nonresolving. Failed talc pleurodesis. Thoracic surgery on board #Symptomatic anemia secondary to mild blood loss at CT site, s/p PRBC transfusion #Dysphagia with high aspiration risk per  Stage IV mantle cell lymphoma #Acute on chronic thrombocytopenia, s/p platelets, due to malignancy #Splenomegaly with retroperitoneal adenopathy on CT A/P, concern for lymphoproliferative disorder 
 
 
-Admitted to ICU, improved and subsequently transferred to [address] [place] [address] on board 
-Chemotherapy initiation today 
-IVF initiated by heme-onc for tumor lysis syndrome prevention 
-CXR monitoring -PEG tube placement on Monday. N.p.o. after MN on Sunday night 
-Resume tube feeds through Dobbhoff tube 
-Thoracic surgery on board 
-Plan for CT removal along with Pleurx catheter placement on Monday 11/9 
-PEG tube placement as well as Pleurx catheter placement would require coordination on Monday between GI and thoracic surgery. -ST on board, failed swallow eval, high aspiration risk 
-GI consult for PEG tube placement 
-Dobbhoff tube placement 
-Initiate tube feedings per nutrition team recommendations 
-Strict I/os 
-Hodges catheter for the same. If remains stable DC in 24-48 hours -PRBC/platelet transfusion as needed, goals: Hb <7, platelet <50 
-Supportive carebreathing treatment, pain management 
-Palliative care on board. GOC with curative intent. Code status: Full code DVT prophylaxis: SCDs Care Plan discussed with: Patient/Family and Nurse Anticipated Disposition: TBD 
 Anticipated Discharge: Greater than 48 hours Hospital Problems  Date Reviewed: 10/30/2020 Codes Class Noted POA Mantle cell lymphoma ([facility] ICD-10-CM: C83.10 ICD-9-CM: 200.40  11/5/2020 Unknown Leukocytosis ICD-10-CM: J86.437 ICD-9-CM: 288.60  10/30/2020 Unknown Neutropenia ([facility] ICD-10-CM: D70.9 ICD-9-CM: 288.00  10/30/2020 Unknown Anemia ICD-10-CM: D64.9 ICD-9-CM: 285.9  10/30/2020 Unknown Tachycardia ICD-10-CM: R00.0 ICD-9-CM: 785.0  10/30/2020 Unknown Thrombocytopenia ([facility] ICD-10-CM: D69.6 ICD-9-CM: 287.5  10/30/2020 Unknown SIRS (systemic inflammatory response syndrome) (HCC) ICD-10-CM: R65.10 ICD-9-CM: 995.90  10/30/2020 Unknown Pleural effusion, left ICD-10-CM: J90 ICD-9-CM: 511.9  10/30/2020 Unknown * (Principal) Acute respiratory failure with hypoxia ([facility] ICD-10-CM: J96.01 
ICD-9-CM: 518.81  10/30/2020 Yes Review of Systems: A comprehensive review of systems was negative except for that written in the HPI. Vital Signs:  
 Last 24hrs VS reviewed since prior progress note. Most recent are: 
Visit Vitals /76 Pulse (!) 125 Temp 97.3 °F (36.3 °C) Resp 24 Ht 5' (1.524 m) Wt 49.5 kg (109 lb 2 oz) SpO2 99% BMI 21.31 kg/m² Intake/Output Summary (Last 24 hours) at 11/6/2020 2208 Last data filed at 11/6/2020 2132 Gross per 24 hour Intake 2064.25 ml Output 1037 ml Net 1027.25 ml Physical Examination:  
 
I had a face to face encounter with this patient and independently examined them on 11/6/2020 as outlined below: 
 
     
General:          Alert, cooperative, mild distress, appears stated age. Frail and weak HEENT:           Atraumatic, anicteric sclerae, pink conjunctivae No oral ulcers, mucosa dry, throat clear, dentition fair Neck:               Supple, symmetrical 
Lungs:             Clear to auscultation bilaterally.   No Wheezing or Rhonchi. No rales. Chest wall:      No tenderness  No Accessory muscle use. Left subclavian central line in place Heart:              Regular  rhythm,  No  murmur   No edema Abdomen:        Soft, non-tender. Not distended. Bowel sounds normal 
Extremities:     No cyanosis. No clubbing,   
                        Skin turgor normal, Capillary refill normal 
Skin:                Not pale. Not Jaundiced  No rashes Psych:             Not anxious or agitated. Neurologic:      Alert, moves all extremities, answers questions appropriately and responds to commands Data Review:  
 Review and/or order of clinical lab test 
Review and/or order of tests in the radiology section of [facility] Review and/or order of tests in the medicine section of [facility] Radiology Xr Abd (kub) Result Date: 11/5/2020 IMPRESSION: Non-obstructive bowel gas pattern. . Feeding tube tip right upper quadrant, possibly projecting over the duodenal bulb. Xr Abd (kub) Result Date: 10/30/2020 IMPRESSION: 1. Nasogastric tube in satisfactory position 2. Massive splenomegaly Cta Chest W Or W Wo Cont Result Date: 10/30/2020 IMPRESSION: There is no pulmonary embolism. There is no aortic aneurysm or dissection. Large left-sided pleural effusion with left lung atelectasis. Left to right midline shift. There is mediastinal and hilar adenopathy which is extensive. Neoplastic etiology should be considered. Ct Abd Pelv Wo Cont Result Date: 10/30/2020 IMPRESSION: There is massive splenomegaly with extensive retroperitoneal adenopathy. Findings are consistent with lymphoproliferative disorder. Xr Chest [place] Result Date: 11/6/2020 IMPRESSION: 1. Questionable trace left apical pneumothorax, with stable positioning of left apical chest tube. 2. Mildly improved bilateral airspace disease and pleural effusions. Xr Chest [place] Result Date: 11/3/2020 IMPRESSION: Stable exam. No evidence for pneumothorax Xr Chest [facility] Result Date: 11/2/2020 IMPRESSION: Left chest tube remains in good position. Small left pleural effusion. No pneumothorax. Increased bilateral pulmonary edema versus pneumonia. Xr Chest [facility] Result Date: 10/31/2020 IMPRESSION: Left-sided central venous access catheter without pneumothorax. Catheter in appropriate position for use. Extensive left lung parenchymal opacity with improved left-sided effusion. Interval left pleural drainage catheter placement. Mediastinal and hilar adenopathy with left hilar mass lesion/pneumonia. Cardiomegaly. Mena Anthony Chest [facility] Result Date: 10/30/2020 IMPRESSION: 1. Decreased size of left-sided pleural effusion post chest tube placement 2. Persistent bilateral airspace disease. Prominence in the region of the aortic knob Xr Chest [facility] Result Date: 10/30/2020 IMPRESSION: Extensive left lung parenchymal opacity with parapneumonic effusion. Mediastinal adenopathy is suspected. Cardiomegaly. .  
 
 
Labs:  
 
Recent Labs 11/06/20 0422 11/04/20 
0404 WBC 15.5* 20.7* HGB 8.1* 8.5* HCT 27.6* 30.1* PLT 40* 33* Recent Labs 11/06/20 0422 11/05/20 
0530 11/04/20 
0404 *  --  144  
K 3.4*  --  3.9 *  --  113* CO2 25  --  27 BUN 39*  --  29* CREA 0.61  --  0.72 GLU 73  --  84  
CA 8.0*  --  7.5* MG  --   --  1.9 PHOS  --   --  3.0  
URICA 5.2 5.1  --   
 
Recent Labs 11/06/20 0422 ALT 12  
AP 83 TBILI 0.6 TP 5.0* ALB 1.8*  
GLOB 3.2 No results for input(s): INR, PTP, APTT, INREXT, INREXT in the last 72 hours. No results for input(s): FE, TIBC, PSAT, FERR in the last 72 hours. Lab Results Component Value Date/Time Folate 15.5 10/31/2020 06:47 PM  
  
No results for input(s): PH, PCO2, PO2 in the last 72 hours. No results for input(s): CPK, CKNDX, TROIQ in the last 72 hours. No lab exists for component: CPKMB No results found for: CHOL, CHOLX, CHLST, CHOLV, HDL, HDLP, LDL, LDLC, Melisa Araujo[address], [address] [facility], [address] [facility] Lab Results Component Value Date/Time Glucose (POC) 102 (H) 10/31/2020 06:54 PM  
 
No results found for: COLOR, APPRN, SPGRU, REFSG, TONNY, PROTU, GLUCU, KETU, BILU, UROU, ALEX, LEUKU, GLUKE, EPSU, BACTU, WBCU, RBCU, CASTS, UCRY Medications Reviewed:  
 
Current Facility-Administered Medications Medication Dose Route Frequency  0.9% sodium chloride infusion  100 mL/hr IntraVENous CONTINUOUS  
 meperidine (DEMEROL) injection 25 mg  25 mg IntraVENous PRN  
 0.9% sodium chloride infusion  25 mL/hr IntraVENous CONTINUOUS  
 riTUXimab-pvvr (RUXIENCE) 548 mg in 0.9% sodium chloride 548 mL infusion  375 mg/m2 (Treatment Plan Recorded) IntraVENous ONCE TITR  
 [START ON 11/7/2020] dexamethasone (DECADRON) 4 mg/mL injection 10 mg  10 mg IntraVENous Q24H  
 [START ON 11/7/2020] bendamustine 132.5 mg in 0.9% sodium chloride 50 mL, overfill volume 5 mL chemo infusion  90 mg/m2 (Treatment Plan Recorded) IntraVENous Q24H  
 [START ON 11/7/2020] ondansetron (ZOFRAN) injection 8 mg  8 mg IntraVENous Q24H  
 allopurinoL (ZYLOPRIM) tablet 300 mg  300 mg Oral BID  alteplase (CATHFLO) 1 mg in sterile water (preservative free) 1 mL injection  1 mg InterCATHeter PRN  
 bacitracin 500 unit/gram packet 1 Packet  1 Packet Topical PRN  polyethylene glycol (MIRALAX) packet 17 g  17 g Per NG tube DAILY  ferrous sulfate 300 mg (60 mg iron)/5 mL oral syrup 300 mg  300 mg Oral DAILY WITH BREAKFAST  balsam peru-castor oiL (VENELEX) ointment   Topical TID  midodrine (PROAMATINE) tablet 5 mg  5 mg Oral TID WITH MEALS  fentaNYL citrate (PF) injection 25-50 mcg  25-50 mcg IntraVENous Q2H PRN  
 ondansetron (ZOFRAN) injection 4 mg  4 mg IntraVENous Q6H PRN  
 white petrolatum-mineral oiL (AKWA TEARS) 83-15 % ophthalmic ointment   Both Eyes Q12H  
 sodium chloride (NS) flush 5-40 mL  5-40 mL IntraVENous Q8H  
 sodium chloride (NS) flush 5-40 mL  5-40 mL IntraVENous PRN  
  acetaminophen (TYLENOL) tablet 650 mg  650 mg Oral Q6H PRN Or  
 acetaminophen (TYLENOL) suppository 650 mg  650 mg Rectal Q6H PRN  
 
______________________________________________________________________ EXPECTED LENGTH OF STAY: 7d 21h ACTUAL LENGTH OF STAY:          7 Agustina Hare MD

## 2020-11-07 NOTE — PROGRESS NOTES
[facility] [address] 
____________________________________________________________ Admit Date: 10/30/2020 POD/HD 8 Days Post-Op Procedure:  Procedure(s): LEFT VATS PLEURAL BIOPSIES POSSIBLE TALC PLEURODESIS/ 
 
Subjective:  
 
Patient has no new complaints. Objective:  
 
Blood pressure (!) 116/48, pulse 96, temperature 97.8 °F (36.6 °C), resp. rate 17, height 5' (1.524 m), weight 49.8 kg (109 lb 12.6 oz), SpO2 100 %. Temp (24hrs), Av.6 °F (36.4 °C), Min:97.1 °F (36.2 °C), Max:98.1 °F (36.7 °C) Date 20 - 20 5750 Shift 9306-2746 9671-9473 2626-5861 24 Hour Total  
INTAKE Shift Total(mL/kg) OUTPUT Urine(mL/kg/hr) 190   190 Shift Total(mL/kg) 190(3.8)   190(3.8) Weight (kg) 49.8 49.8 49.8 49.8 Date 20 - 20 0623 Shift 7760-3732 6152-4502 6896-4965 24 Hour Total  
INTAKE Shift Total(mL/kg) OUTPUT Urine(mL/kg/hr) 190   190 Shift Total(mL/kg) 190(3.8)   190(3.8) Weight (kg) 49.8 49.8 49.8 49.8 Physical Exam:  LUNG: clear to auscultation bilaterally, HEART: regular rate and rhythm, S1, S2 normal, no murmur, click, rub or gallop Incision:clean, dry and intact Chest Tube: clamped Labs:  
Recent Results (from the past 24 hour(s)) CBC WITH AUTOMATED DIFF Collection Time: 20  4:02 AM  
Result Value Ref Range WBC  K/uL SPECIMEN INTEGRITY QUESTIONABLE. SUGGEST RECOLLECTION  
 RBC  M/uL SPECIMEN INTEGRITY QUESTIONABLE. SUGGEST RECOLLECTION  
 HGB  12.0 - 16.0 g/dL SPECIMEN INTEGRITY QUESTIONABLE. SUGGEST RECOLLECTION  
 HCT  % SPECIMEN INTEGRITY QUESTIONABLE. SUGGEST RECOLLECTION  
 MCV  78.0 - 102.0 FL  
  SPECIMEN INTEGRITY QUESTIONABLE. SUGGEST RECOLLECTION  
 MCH  25.0 - 35.0 PG  
  SPECIMEN INTEGRITY QUESTIONABLE. SUGGEST RECOLLECTION  
 MCHC  31.0 - 37.0 g/dL SPECIMEN INTEGRITY QUESTIONABLE. SUGGEST RECOLLECTION  
 RDW  11.5 - 14.5 % SPECIMEN INTEGRITY QUESTIONABLE. SUGGEST RECOLLECTION  
 PLATELET  K/uL SPECIMEN INTEGRITY QUESTIONABLE. SUGGEST RECOLLECTION  
 MPV  7.4 - 10.4 FL  
  SPECIMEN INTEGRITY QUESTIONABLE. SUGGEST RECOLLECTION  
 NRBC  0  WBC SPECIMEN INTEGRITY QUESTIONABLE. SUGGEST RECOLLECTION ABSOLUTE NRBC  K/uL SPECIMEN INTEGRITY QUESTIONABLE. SUGGEST RECOLLECTION  
 NEUTROPHILS  42 - 75 % SPECIMEN INTEGRITY QUESTIONABLE. SUGGEST RECOLLECTION  
 LYMPHOCYTES  12 - 49 % SPECIMEN INTEGRITY QUESTIONABLE. SUGGEST RECOLLECTION  
 MONOCYTES  5 - 13 % SPECIMEN INTEGRITY QUESTIONABLE. SUGGEST RECOLLECTION  
 EOSINOPHILS  0 - 5 % SPECIMEN INTEGRITY QUESTIONABLE. SUGGEST RECOLLECTION  
 BASOPHILS  0 - 1 % SPECIMEN INTEGRITY QUESTIONABLE. SUGGEST RECOLLECTION  
 IMMATURE GRANULOCYTES  0.0 - 5.0 % SPECIMEN INTEGRITY QUESTIONABLE. SUGGEST RECOLLECTION  
 ABS. NEUTROPHILS  K/UL SPECIMEN INTEGRITY QUESTIONABLE. SUGGEST RECOLLECTION  
 ABS. LYMPHOCYTES  K/UL SPECIMEN INTEGRITY QUESTIONABLE. SUGGEST RECOLLECTION  
 ABS. MONOCYTES  K/UL SPECIMEN INTEGRITY QUESTIONABLE. SUGGEST RECOLLECTION  
 ABS. EOSINOPHILS  K/UL SPECIMEN INTEGRITY QUESTIONABLE. SUGGEST RECOLLECTION  
 ABS. BASOPHILS  K/UL SPECIMEN INTEGRITY QUESTIONABLE. SUGGEST RECOLLECTION  
 ABS. IMM. GRANS.  0.0 - 0.5 K/UL SPECIMEN INTEGRITY QUESTIONABLE. SUGGEST RECOLLECTION  
 DF     
  SPECIMEN INTEGRITY QUESTIONABLE. SUGGEST RECOLLECTION  
METABOLIC PANEL, COMPREHENSIVE Collection Time: 11/07/20  4:02 AM  
Result Value Ref Range Sodium 149 (H) 136 - 145 mmol/L Potassium 3.5 3.5 - 5.1 mmol/L Chloride 118 (H) 97 - 108 mmol/L  
 CO2 22 21 - 32 mmol/L Anion gap 9 5 - 15 mmol/L Glucose 93 65 - 100 mg/dL BUN 38 (H) 6 - 20 MG/DL Creatinine 0.69 0.55 - 1.02 MG/DL  
 BUN/Creatinine ratio 55 (H) 12 - 20 GFR est AA >60 >60 ml/min/1.73m2 GFR est non-AA >60 >60 ml/min/1.73m2 Calcium 7.1 (L) 8.5 - 10.1 MG/DL Bilirubin, total 0.3 0.2 - 1.0 MG/DL  
 ALT (SGPT) 14 12 - 78 U/L  
 AST (SGOT) 116 (H) 15 - 37 U/L Alk. phosphatase 81 45 - 117 U/L Protein, total 4.5 (L) 6.4 - 8.2 g/dL Albumin 1.6 (L) 3.5 - 5.0 g/dL Globulin 2.9 2.0 - 4.0 g/dL A-G Ratio 0.6 (L) 1.1 - 2.2 URIC ACID Collection Time: 11/07/20  4:02 AM  
Result Value Ref Range Uric acid 4.5 2.6 - 6.0 MG/DL  
TYPE & SCREEN Collection Time: 11/07/20  4:02 AM  
Result Value Ref Range Crossmatch Expiration 11/10/2020,2359 ABO/Rh(D) A POSITIVE Antibody screen NEG   
CBC WITH AUTOMATED DIFF Collection Time: 11/07/20  7:15 AM  
Result Value Ref Range WBC 5.4 3.6 - 11.0 K/uL  
 RBC 2.95 (L) 3.80 - 5.20 M/uL HGB 7.3 (L) 11.5 - 16.0 g/dL HCT 25.6 (L) 35.0 - 47.0 % MCV 86.8 80.0 - 99.0 FL  
 MCH 24.7 (L) 26.0 - 34.0 PG  
 MCHC 28.5 (L) 30.0 - 36.5 g/dL  
 RDW 19.8 (H) 11.5 - 14.5 % PLATELET 6 (LL) 865 - 400 K/uL NRBC 1.7 (H) 0  WBC ABSOLUTE NRBC 0.09 (H) 0.00 - 0.01 K/uL NEUTROPHILS 44 32 - 75 % BAND NEUTROPHILS 4 0 - 6 % LYMPHOCYTES 49 12 - 49 % MONOCYTES 3 (L) 5 - 13 % EOSINOPHILS 0 0 - 7 % BASOPHILS 0 0 - 1 % IMMATURE GRANULOCYTES 0 %  
 ABS. NEUTROPHILS 2.6 1.8 - 8.0 K/UL  
 ABS. LYMPHOCYTES 2.6 0.8 - 3.5 K/UL  
 ABS. MONOCYTES 0.2 0.0 - 1.0 K/UL  
 ABS. EOSINOPHILS 0.0 0.0 - 0.4 K/UL  
 ABS. BASOPHILS 0.0 0.0 - 0.1 K/UL  
 ABS. IMM. GRANS. 0.0 K/UL  
 DF MANUAL    
 RBC COMMENTS OVALOCYTES PRESENT 
    
 RBC COMMENTS HYPOCHROMIA 1+ 
    
 RBC COMMENTS ANISOCYTOSIS 
1+ Assessment:  
 
Principal Problem: 
  Acute respiratory failure with hypoxia ([address] (10/30/2020) Active Problems: 
  Leukocytosis (10/30/2020) Neutropenia ([address] (10/30/2020) Anemia (10/30/2020) Tachycardia (10/30/2020) Thrombocytopenia ([address] (10/30/2020) SIRS (systemic inflammatory response syndrome) ([address] (10/30/2020) Pleural effusion, left (10/30/2020) Mantle cell lymphoma ([facility] (11/5/2020) Plan/Recommendations/Medical Decision Making: Will check CXR in am  
Plan for pleur X Monday Thank you for allowing us to participate in the care of your patient.  
 
El Kowalski MD

## 2020-11-08 NOTE — PROGRESS NOTES
2000: pt arrived from 3 W via bed. Placed on monitor. Pt somnolent, difficult to arouse. Pt taking shallow breaths. Jasmin Caballero NP.  
2030: ABG completed CO2 78. Shar Bakerer speaking with son about options. 0000: son made patient a DNR and comfort. 0710: Report called to [address]. pt moved to rm 624. Care transferred to hospitalist service.

## 2020-11-08 NOTE — PROGRESS NOTES
GI Note Came to see patient for possible PEG tomorrow. O: 
Gen: Lethargic/sleeping Abd: soft, distended Labs/Images: All reviewed A/P: 
Discussed with Dr. Emigdio Sahni. Pt has been made comfort care so PEG will be cancelled for tomorrow. Will notify Dr. Mederos Layman of this Signed By: Ana Beaver MD   
 November 8, 2020

## 2020-11-08 NOTE — PROGRESS NOTES
[facility] Adult  Hospitalist Group Hospitalist Progress Note Alex Baldwin MD 
Answering service: [phone] from in house phone Date of Service:  2020 NAME:  Jessica Goff :  1945 MRN:  [id number] Admission Summary:  
Jessica Goff is a 70 y.o. female with past medical history of left breast cancer, s/p radiation, and childhood asthma presented to the ED from home with chief complaint of SOB, leg swelling, and weight loss. Symptoms reportedly have been gradual in onset, worsening, now severe, constant, with SOB, LOPEZ, orthopnea. Patient reported no known chronic medical conditions. There were no reports of recent sick contacts or exposure to anyone with COVID 19. On arrival in the ED tonight, initial recorded vital signs were BP = 140/93, HR= 122, RR = 26, O2sat = 89% on room air. Patient was initially placed on 2 lites O2 via nasal cannula (NC). However per ED MD report, patient has becoming increasing dyspneic, with increased supplemental oxygen requirement. Patient had no known prior lung disease. CTA chest revealed a large left pleural effusion with left lung atelectasis. Labs revealed WBC = 27.2, neutrophils = 2%, and absolute neutrophil count = 0.5. Hemoglobin = 6.4 (with no comparison lab available for review). ED offered PRBC transfusion but patient reportedly refused. She was started on Levofloxacin 750 mg IV for antibiotic coverage. Patient is now seen for admission to the hospitalist service. Interval history / Subjective: Follow up SOB, acute hypoxic respiratory failure Patient now comfort care only Comfortably lying in bed Assessment & Plan: #Acute hypoxic respiratory failure s/p intubation with mechanical ventilation. Now extubated.  Comfortable on 2l NC 
 #Large malignant left pleural effusion s/p VATS with CT placement by thoracic surgery, nonresolving. Failed talc pleurodesis. #Symptomatic anemia secondary to mild blood loss at CT site, s/p PRBC transfusion #Dysphagia with high aspiration risk per ST Stage IV mantle cell lymphoma #Acute on chronic thrombocytopenia, s/p platelets, due to malignancy #Splenomegaly with retroperitoneal adenopathy on CT A/P, concern for lymphoproliferative disorder Now the patient is comfort measures only Code status: DNR 
DVT prophylaxis: SCDs Plan: Follow Hospice, likely the patient would qualify for inpatient hospice Care Plan discussed with: Patient/Family and Nurse Anticipated Disposition: TBD Anticipated Discharge: less than 24 hours Hospital Problems  Date Reviewed: 10/30/2020 Codes Class Noted POA Mantle cell lymphoma ([facility] ICD-10-CM: C83.10 ICD-9-CM: 200.40  11/5/2020 Unknown Leukocytosis ICD-10-CM: F43.539 ICD-9-CM: 288.60  10/30/2020 Unknown Neutropenia ([facility] ICD-10-CM: D70.9 ICD-9-CM: 288.00  10/30/2020 Unknown Anemia ICD-10-CM: D64.9 ICD-9-CM: 285.9  10/30/2020 Unknown Tachycardia ICD-10-CM: R00.0 ICD-9-CM: 785.0  10/30/2020 Unknown Thrombocytopenia ([facility] ICD-10-CM: D69.6 ICD-9-CM: 287.5  10/30/2020 Unknown SIRS (systemic inflammatory response syndrome) (HCC) ICD-10-CM: R65.10 ICD-9-CM: 995.90  10/30/2020 Unknown Pleural effusion, left ICD-10-CM: J90 ICD-9-CM: 511.9  10/30/2020 Unknown * (Principal) Acute respiratory failure with hypoxia ([facility] ICD-10-CM: J96.01 
ICD-9-CM: 518.81  10/30/2020 Yes Review of Systems: A comprehensive review of systems was negative except for that written in the HPI. Vital Signs:  
 Last 24hrs VS reviewed since prior progress note. Most recent are: 
Visit Vitals BP (!) 82/49 (BP 1 Location: Right arm, BP Patient Position: At rest) Pulse 95 Temp 96.8 °F (36 °C) Resp 16 Ht 5' (1.524 m) Wt 49.8 kg (109 lb 12.6 oz) SpO2 99% BMI 21.44 kg/m² Intake/Output Summary (Last 24 hours) at 11/8/2020 1321 Last data filed at 11/8/2020 0960 Gross per 24 hour Intake 2038.79 ml Output 950 ml Net 1088.79 ml Physical Examination:  
 
I had a face to face encounter with this patient and independently examined them on 11/8/2020 as outlined below: 
 
     
General:          comfortably sleeping HEENT:           Atraumatic, anicteric sclerae, pink conjunctivae No oral ulcers, mucosa dry, throat clear, dentition fair Neck:               Supple, symmetrical 
Lungs:             Clear to auscultation bilaterally. No Wheezing or Rhonchi. No rales. Chest wall:      No tenderness  No Accessory muscle use. Left subclavian central line in place Heart:              Regular  rhythm,  No  murmur   No edema Abdomen:        Soft, non-tender. Not distended. Bowel sounds normal 
Extremities:     No cyanosis. No clubbing,   
                        Skin turgor normal, Capillary refill normal 
Skin:                Not pale. Not Jaundiced  No rashes Psych:             Not anxious or agitated. Neurologic:      Asleeping Data Review:  
 Review and/or order of clinical lab test 
Review and/or order of tests in the radiology section of CPT Review and/or order of tests in the medicine section of [facility] Radiology Xr Abd (kub) Result Date: 11/7/2020 IMPRESSION: Gastric tube tip is in appropriate position for use. Gastric tube has been advanced and now lies in the proximal duodenum/gastric antrum. Xr Abd (kub) Result Date: 11/5/2020 IMPRESSION: Non-obstructive bowel gas pattern. . Feeding tube tip right upper quadrant, possibly projecting over the duodenal bulb. Xr Abd (kub) Result Date: 10/30/2020 IMPRESSION: 1. Nasogastric tube in satisfactory position 2. Massive splenomegaly Cta Chest W Or W Wo Cont Result Date: 10/30/2020 IMPRESSION: There is no pulmonary embolism. There is no aortic aneurysm or dissection. Large left-sided pleural effusion with left lung atelectasis. Left to right midline shift. There is mediastinal and hilar adenopathy which is extensive. Neoplastic etiology should be considered. Ct Abd Pelv Wo Cont Result Date: 10/30/2020 IMPRESSION: There is massive splenomegaly with extensive retroperitoneal adenopathy. Findings are consistent with lymphoproliferative disorder. Xr Chest [facility] Result Date: 11/8/2020 IMPRESSION: Stable to slightly worsened bilateral lung infiltrates. Stable lines and tubes except for removal of feeding tube. [facility] Result Date: 11/7/2020 IMPRESSION: Slight worsening of bilateral diffuse lung infiltrates and pleural effusions. Stable lines and tubes. .  . Xr [facility] Result Date: 11/6/2020 IMPRESSION: 1. Questionable trace left apical pneumothorax, with stable positioning of left apical chest tube. 2. Mildly improved bilateral airspace disease and pleural effusions. Xr [facility] Result Date: 11/3/2020 IMPRESSION: Stable exam. No evidence for pneumothorax Xr [facility] Result Date: 11/2/2020 IMPRESSION: Left chest tube remains in good position. Small left pleural effusion. No pneumothorax. Increased bilateral pulmonary edema versus pneumonia. Xr [facility] Result Date: 10/31/2020 IMPRESSION: Left-sided central venous access catheter without pneumothorax. Catheter in appropriate position for use. Extensive left lung parenchymal opacity with improved left-sided effusion. Interval left pleural drainage catheter placement. Mediastinal and hilar adenopathy with left hilar mass lesion/pneumonia. Cardiomegaly. [facility] Result Date: 10/30/2020 IMPRESSION: 1. Decreased size of left-sided pleural effusion post chest tube placement 2. Persistent bilateral airspace disease.  Prominence in the region of the aortic knob Xr Chest [facility] Result Date: 10/30/2020 IMPRESSION: Extensive left lung parenchymal opacity with parapneumonic effusion. Mediastinal adenopathy is suspected. Cardiomegaly. .  
 
 
Labs:  
 
Recent Labs 11/07/20 2011 11/07/20 
1558 11/07/20 
0715 WBC 13.2*  --  5.4 HGB 8.1*  --  7.3* HCT 29.3*  --  25.6* PLT 29* 26* 6* Recent Labs 11/07/20 2011 11/07/20 0402 11/06/20 
0422 * 149* 146*  
K 4.5 3.5 3.4*  
* 118* 115* CO2 28 22 25 BUN 38* 38* 39* CREA 0.57 0.69 0.61 * 93 73 CA 6.8* 7.1* 8.0* URICA  --  4.5 5.2 Recent Labs 11/07/20 2011 11/07/20 
0402 11/06/20 0422 ALT 15 14 12 AP 87 81 83 TBILI 0.3 0.3 0.6 TP 5.0* 4.5* 5.0* ALB 1.8* 1.6* 1.8*  
GLOB 3.2 2.9 3.2 No results for input(s): INR, PTP, APTT, INREXT, INREXT in the last 72 hours. No results for input(s): FE, TIBC, PSAT, FERR in the last 72 hours. Lab Results Component Value Date/Time Folate 15.5 10/31/2020 06:47 PM  
  
No results for input(s): PH, PCO2, PO2 in the last 72 hours. No results for input(s): CPK, CKNDX, TROIQ in the last 72 hours. No lab exists for component: CPKMB No results found for: CHOL, CHOLX, CHLST, CHOLV, HDL, HDLP, LDL, LDLC, DLDLP, TGLX, TRIGL, TRIGP, CHHD, CHHDX Lab Results Component Value Date/Time Glucose (POC) 102 (H) 10/31/2020 06:54 PM  
 
No results found for: COLOR, APPRN, SPGRU, REFSG, TONNY, PROTU, GLUCU, KETU, BILU, UROU, ALEX, LEUKU, GLUKE, EPSU, BACTU, WBCU, RBCU, CASTS, UCRY Medications Reviewed:  
 
Current Facility-Administered Medications Medication Dose Route Frequency  morphine injection 1 mg  1 mg IntraVENous Q1H PRN  
 LORazepam (ATIVAN) injection 1 mg  1 mg IntraVENous Q1H PRN  
 alteplase (CATHFLO) 1 mg in sterile water (preservative free) 1 mL injection  1 mg InterCATHeter PRN  
 bacitracin 500 unit/gram packet 1 Packet  1 Packet Topical PRN  
  ondansetron (ZOFRAN) injection 4 mg  4 mg IntraVENous Q6H PRN  
 white petrolatum-mineral oiL (AKWA TEARS) 83-15 % ophthalmic ointment   Both Eyes Q12H  
 sodium chloride (NS) flush 5-40 mL  5-40 mL IntraVENous Q8H  
 sodium chloride (NS) flush 5-40 mL  5-40 mL IntraVENous PRN  
 acetaminophen (TYLENOL) tablet 650 mg  650 mg Oral Q6H PRN Or  
 acetaminophen (TYLENOL) suppository 650 mg  650 mg Rectal Q6H PRN  
 
______________________________________________________________________ EXPECTED LENGTH OF STAY: 7d 21h ACTUAL LENGTH OF STAY:          9 Francisco Shoemaker MD

## 2020-11-08 NOTE — PROGRESS NOTES
2013: ABG drawn, results shown to Dr Bhargav Harrison and Real Zuniga NP. Results left with MD and NP.  
 
7.11/78.6/89.3/24.4/BE: -7/93.1% Ventilator and intubation set up on standby

## 2020-11-08 NOTE — PROGRESS NOTES
Hematology-Oncology Progress Note Wendy Ruiz 
1945 
[id number] 
11/8/2020 Subjective: She has now been made comfort care. Sleeping. I did not wake Allergies: Patient has no known allergies. Current Facility-Administered Medications Medication Dose Route Frequency Provider Last Rate Last Dose  morphine injection 1 mg  1 mg IntraVENous Q1H PRN James Nixon NP   1 mg at 11/08/20 1040  LORazepam (ATIVAN) injection 1 mg  1 mg IntraVENous Q1H PRN James Nixon NP      
 alteplase (CATHFLO) 1 mg in sterile water (preservative free) 1 mL injection  1 mg InterCATHeter PRN Roxana Thorne MD      
 bacitracin 500 unit/gram packet 1 Packet  1 Packet Topical PRN Roxana Thorne MD      
 ondansetron (ZOFRAN) injection 4 mg  4 mg IntraVENous Q6H PRN BEV Giraldo      
 white petrolatum-mineral oiL (AKWA TEARS) 83-15 % ophthalmic ointment   Both Eyes Q12H BEV Giraldo   Stopped at 11/08/20 0900  
 sodium chloride (NS) flush 5-40 mL  5-40 mL IntraVENous Q8H Angelo, Nahum CORTEZ MD   10 mL at 11/08/20 0600  
 sodium chloride (NS) flush 5-40 mL  5-40 mL IntraVENous PRN Norma Langford MD   10 mL at 11/06/20 1014  acetaminophen (TYLENOL) tablet 650 mg  650 mg Oral Q6H PRN Norma Langford MD      
 Or  
 acetaminophen (TYLENOL) suppository 650 mg  650 mg Rectal Q6H PRN Norma Langford MD      
 
Objective:  
 
Patient Vitals for the past 24 hrs: 
 BP Temp Pulse Resp SpO2  
11/08/20 0840 (!) 82/49  95 16 99 % 11/08/20 0651 (!) 96/51 96.8 °F (36 °C) 95 16 97 % 11/08/20 0600 (!) 87/54  94 16 97 % 11/08/20 0500 (!) 97/53  93 15 97 % 11/08/20 0400 (!) 99/55  95 15 98 % 11/08/20 0300 (!) 97/52  94 16 96 % 11/08/20 0200 (!) 96/52  98 15 95 % 11/08/20 0100 (!) 92/51  97 15 99 % 11/08/20 0045 (!) 92/54  96 15 97 % 11/08/20 0030 (!) 95/50  94 15 99 % 11/08/20 0015 (!) 89/52  96 14 99 % 11/08/20 0000 (!) 93/52 97.5 °F (36.4 °C) 92 15 96 % 11/07/20 2345 (!) 90/49  100 21 97 % 11/07/20 2330 (!) 102/55  98 19 95 % 11/07/20 2315 90/65      
11/07/20 2300 (!) 90/46  95 13 95 % 11/07/20 2245 (!) 84/54  97 22 94 % 11/07/20 2230 (!) 91/55  (!) 102 16 93 % 11/07/20 2215 (!) 93/55  100 18 93 % 11/07/20 2200 (!) 94/54  95 15 94 % 11/07/20 2145 (!) 113/53  (!) 105 30 94 % 11/07/20 2130 92/60  100 (!) 34 93 % 11/07/20 2115 (!) 96/56  100 18 93 % 11/07/20 2100 (!) 97/53  100 (!) 36 94 % 11/07/20 2045 (!) 87/53  99 (!) 33 97 % 11/07/20 2030 (!) 87/52  97 19 99 % 11/07/20 2015 (!) 95/51  98 17 96 % 11/07/20 2004 (!) 92/50 97.7 °F (36.5 °C) 98 18   
11/07/20 1900 (!) 97/39 97.5 °F (36.4 °C) 98 20 97 % 11/07/20 1830 (!) 112/52  98    
11/07/20 1755 107/71  (!) 102    
11/07/20 1500 (!) 102/48 97 °F (36.1 °C) 97 14 99 % 11/07/20 1449   (!) 101 16   
11/07/20 1445 (!) 102/48 97.1 °F (36.2 °C) (!) 105 20 95 % Gen: ill appearing with bitemporal wasting, sleeping Available labs reviewed: 
Labs:   
Recent Results (from the past 24 hour(s)) PLATELET COUNT Collection Time: 11/07/20  3:58 PM  
Result Value Ref Range PLATELET 26 (LL) 852 - 400 K/uL LACTIC ACID Collection Time: 11/07/20  8:11 PM  
Result Value Ref Range Lactic acid 1.9 0.4 - 2.0 MMOL/L  
CBC W/O DIFF Collection Time: 11/07/20  8:11 PM  
Result Value Ref Range WBC 13.2 (H) 3.6 - 11.0 K/uL  
 RBC 3.26 (L) 3.80 - 5.20 M/uL HGB 8.1 (L) 11.5 - 16.0 g/dL HCT 29.3 (L) 35.0 - 47.0 % MCV 89.9 80.0 - 99.0 FL  
 MCH 24.8 (L) 26.0 - 34.0 PG  
 MCHC 27.6 (L) 30.0 - 36.5 g/dL  
 RDW 19.7 (H) 11.5 - 14.5 % PLATELET 29 (LL) 560 - 400 K/uL MPV 9.7 8.9 - 12.9 FL  
 NRBC 0.7 (H) 0  WBC ABSOLUTE NRBC 0.09 (H) 0.00 - 0.01 K/uL METABOLIC PANEL, COMPREHENSIVE Collection Time: 11/07/20  8:11 PM  
Result Value Ref Range  Sodium 148 (H) 136 - 145 mmol/L  
 Potassium 4.5 3.5 - 5.1 mmol/L Chloride 118 (H) 97 - 108 mmol/L  
 CO2 28 21 - 32 mmol/L Anion gap 2 (L) 5 - 15 mmol/L Glucose 125 (H) 65 - 100 mg/dL BUN 38 (H) 6 - 20 MG/DL Creatinine 0.57 0.55 - 1.02 MG/DL  
 BUN/Creatinine ratio 67 (H) 12 - 20 GFR est AA >60 >60 ml/min/1.73m2 GFR est non-AA >60 >60 ml/min/1.73m2 Calcium 6.8 (L) 8.5 - 10.1 MG/DL Bilirubin, total 0.3 0.2 - 1.0 MG/DL  
 ALT (SGPT) 15 12 - 78 U/L  
 AST (SGOT) 85 (H) 15 - 37 U/L Alk. phosphatase 87 45 - 117 U/L Protein, total 5.0 (L) 6.4 - 8.2 g/dL Albumin 1.8 (L) 3.5 - 5.0 g/dL Globulin 3.2 2.0 - 4.0 g/dL A-G Ratio 0.6 (L) 1.1 - 2.2 Assessment and Plan  
 
69 y/o woman with newly diagnosed stage IV mantle cell lymphoma. 1. MCL: S/p rituxan 11/6 . First bendamustine 11/7. Has had progressive decline and now comfort care. Continue supportive measures. Ramo Christensen MD 
Hematology/Oncology Phone [phone]

## 2020-11-08 NOTE — PROGRESS NOTES
Physical Therapy Noted patient now with comfort measures only. Will complete PT order at this time. If POC changes please reconsult.  
Vonda Baxter, PT, DPT

## 2020-11-08 NOTE — PROGRESS NOTES
Problem: Breathing Pattern - Ineffective Goal: *Absence of hypoxia Outcome: Not Progressing Towards Goal 
Goal: *Use of effective breathing techniques Outcome: Not Progressing Towards Goal 
  
Problem: Patient Education: Go to Patient Education Activity Goal: Patient/Family Education Outcome: Not Progressing Towards Goal 
  
Problem: Falls - Risk of 
Goal: *Absence of Falls Description: Document Ishmael Balbuena Fall Risk and appropriate interventions in the flowsheet. Outcome: Progressing Towards Goal 
Note: Fall Risk Interventions: 
Mobility Interventions: Strengthening exercises (ROM-active/passive), Patient to call before getting OOB, Communicate number of staff needed for ambulation/transfer, Assess mobility with egress test 
 
Mentation Interventions: Family/sitter at bedside, Increase mobility, Reorient patient, Door open when patient unattended, More frequent rounding Medication Interventions: Evaluate medications/consider consulting pharmacy, Patient to call before getting OOB Elimination Interventions: Call light in reach Problem: Patient Education: Go to Patient Education Activity Goal: Patient/Family Education Outcome: Progressing Towards Goal 
  
Problem: Pressure Injury - Risk of 
Goal: *Prevention of pressure injury Description: Document Mauro Scale and appropriate interventions in the flowsheet. Outcome: Not Progressing Towards Goal 
Note: Pressure Injury Interventions: 
Sensory Interventions: Avoid rigorous massage over bony prominences, Float heels, Keep linens dry and wrinkle-free, Minimize linen layers, Monitor skin under medical devices, Turn and reposition approx. every two hours (pillows and wedges if needed) Moisture Interventions: Apply protective barrier, creams and emollients, Check for incontinence Q2 hours and as needed, Internal/External urinary devices, Maintain skin hydration (lotion/cream), Minimize layers Activity Interventions: Pressure redistribution bed/mattress(bed type) Mobility Interventions: HOB 30 degrees or less, Float heels, Pressure redistribution bed/mattress (bed type), Turn and reposition approx. every two hours(pillow and wedges) Nutrition Interventions: Document food/fluid/supplement intake Friction and Shear Interventions: Minimize layers, Lift sheet Problem: Patient Education: Go to Patient Education Activity Goal: Patient/Family Education Outcome: Progressing Towards Goal

## 2020-11-08 NOTE — ROUTINE PROCESS
TRANSFER - OUT REPORT: 
 
Verbal report given to [facility] (name) on Phylicia Kyle  being transferred to ICU 9(unit) for urgent transfer Report consisted of patients Situation, Background, Assessment and  
Recommendations(SBAR). Information from the following report(s) SBAR, OR Summary, Procedure Summary, Intake/Output, MAR and Cardiac Rhythm NSR-NST was reviewed with the receiving nurse. Lines:  
Time Arthur quad lumen 10/31/20 Left Subclavian (Active) [address] Being Utilized Yes 11/07/20 8848 Criteria for Appropriate Use Limited/no vessel suitable for conventional peripheral access 11/07/20 0341 Site Assessment Clean, dry, & intact 11/07/20 0341 Infiltration Assessment 0 11/07/20 0341 Affected Extremity/Extremities Color distal to insertion site pink (or appropriate for race) 11/07/20 0341 Date of Last Dressing Change 11/05/20 11/06/20 1548 Dressing Status Clean, dry, & intact 11/07/20 0341 Dressing Type Disk with Chlorhexadine gluconate (CHG); Transparent 11/07/20 0341 Action Taken Open ports on tubing capped 11/07/20 0341 Proximal Hub Color/Line Status White;Capped 11/07/20 0341 Positive Blood Return (Medial Site) Yes 11/07/20 0341 Medial 1 Hub Color/Line Status Gray;Capped 11/07/20 0341 Positive Blood Return (Lateral Site) Yes 11/07/20 0341 Medial 2 Hub Color/Line Status Blue;Capped 11/07/20 0341 Positive Blood Return (Site #3) Yes 11/07/20 0341 Distal Hub Color/Line Status Brown;Capped 11/07/20 0341 Positive Blood Return (Site #4) Yes 11/07/20 0341 Alcohol Cap Used Yes 11/07/20 0341 Opportunity for questions and clarification was provided. Patient transported with: 
 O2 @ 2 liters Registered Nurse

## 2020-11-08 NOTE — PROGRESS NOTES
[facility] [address] 
____________________________________________________________ Admit Date: 10/30/2020 POD/HD 9 Days Post-Op Procedure:  Procedure(s): LEFT VATS PLEURAL BIOPSIES POSSIBLE TALC PLEURODESIS/ 
 
Subjective:  
 
Patient has no new complaints. Objective:  
 
Blood pressure (!) 82/49, pulse 95, temperature 96.8 °F (36 °C), resp. rate 16, height 5' (1.524 m), weight 49.8 kg (109 lb 12.6 oz), SpO2 99 %. Temp (24hrs), Av.2 °F (36.2 °C), Min:96.1 °F (35.6 °C), Max:98 °F (36.7 °C) Physical Exam:  GENERAL: sedated, LUNG: clear to auscultation bilaterally, HEART: regular rate and rhythm, S1, S2 normal, no murmur, click, rub or gallop Chest Tube: clamped Labs:  
Recent Results (from the past 24 hour(s)) PLATELET COUNT Collection Time: 20  3:58 PM  
Result Value Ref Range PLATELET 26 (LL) 087 - 400 K/uL LACTIC ACID Collection Time: 20  8:11 PM  
Result Value Ref Range Lactic acid 1.9 0.4 - 2.0 MMOL/L  
CBC W/O DIFF Collection Time: 20  8:11 PM  
Result Value Ref Range WBC 13.2 (H) 3.6 - 11.0 K/uL  
 RBC 3.26 (L) 3.80 - 5.20 M/uL HGB 8.1 (L) 11.5 - 16.0 g/dL HCT 29.3 (L) 35.0 - 47.0 % MCV 89.9 80.0 - 99.0 FL  
 MCH 24.8 (L) 26.0 - 34.0 PG  
 MCHC 27.6 (L) 30.0 - 36.5 g/dL  
 RDW 19.7 (H) 11.5 - 14.5 % PLATELET 29 (LL) 942 - 400 K/uL MPV 9.7 8.9 - 12.9 FL  
 NRBC 0.7 (H) 0  WBC ABSOLUTE NRBC 0.09 (H) 0.00 - 0.01 K/uL METABOLIC PANEL, COMPREHENSIVE Collection Time: 20  8:11 PM  
Result Value Ref Range Sodium 148 (H) 136 - 145 mmol/L Potassium 4.5 3.5 - 5.1 mmol/L Chloride 118 (H) 97 - 108 mmol/L  
 CO2 28 21 - 32 mmol/L Anion gap 2 (L) 5 - 15 mmol/L Glucose 125 (H) 65 - 100 mg/dL BUN 38 (H) 6 - 20 MG/DL Creatinine 0.57 0.55 - 1.02 MG/DL  
 BUN/Creatinine ratio 67 (H) 12 - 20 GFR est AA >60 >60 ml/min/1.73m2 GFR est non-AA >60 >60 ml/min/1.73m2 Calcium 6.8 (L) 8.5 - 10.1 MG/DL Bilirubin, total 0.3 0.2 - 1.0 MG/DL  
 ALT (SGPT) 15 12 - 78 U/L  
 AST (SGOT) 85 (H) 15 - 37 U/L Alk. phosphatase 87 45 - 117 U/L Protein, total 5.0 (L) 6.4 - 8.2 g/dL Albumin 1.8 (L) 3.5 - 5.0 g/dL Globulin 3.2 2.0 - 4.0 g/dL A-G Ratio 0.6 (L) 1.1 - 2.2 Data Review images and reports reviewed Assessment:  
 
Principal Problem: 
  Acute respiratory failure with hypoxia ([address] (10/30/2020) Active Problems: 
  Leukocytosis (10/30/2020) Neutropenia ([address] (10/30/2020) Anemia (10/30/2020) Tachycardia (10/30/2020) Thrombocytopenia ([address] (10/30/2020) SIRS (systemic inflammatory response syndrome) ([address] (10/30/2020) Pleural effusion, left (10/30/2020) Mantle cell lymphoma ([address] (11/5/2020) Plan/Recommendations/Medical Decision Making:  
 
Pt made comfort care so will cancel Pluer X insertion Thank you for allowing us to participate in the care of your patient.  
 
Caryl Richard MD

## 2020-11-08 NOTE — PROGRESS NOTES
Attempted a follow up visit with pt. Pt sleeping and did not awake. Chaplains will continue to offer support as needed. Chaplain Gabo, MDiv, MS, [facility] 
[phone]

## 2020-11-09 NOTE — DISCHARGE SUMMARY
[address] [facility]  Hospitalist Group Death Discharge Summary PATIENT ID: Nancy Fontaine MRN: [id number] YOB: 1945 DATE OF ADMISSION: 10/30/2020 12:17 AM   
PRIMARY CARE PROVIDER: Nidia Rodney MD  
ATTENDING PHYSICIAN: Dakota Canales MD 
CONSULTATIONS:  
IP CONSULT TO PULMONOLOGY 
IP CONSULT TO THORACIC SURGERY 
IP CONSULT TO PALLIATIVE CARE - PROVIDER 
IP CONSULT TO HEMATOLOGY 
IP CONSULT TO GASTROENTEROLOGY 
IP CONSULT TO PALLIATIVE CARE - PROVIDER PROCEDURES/SURGERIES:  
Procedure(s): LEFT VATS PLEURAL BIOPSIES POSSIBLE TALC PLEURODESIS/ 
 
REASON FOR ADMISSION: Acute respiratory failure with hypoxia ([facility]) HOSPITAL PROBLEM LIST: 
Patient Active Problem List  
Diagnosis Code  Leukocytosis D72.829  
 Neutropenia (HCC) D70.9  Anemia D64.9  Tachycardia R00.0  Thrombocytopenia ([facility] [address] D69.6  SIRS (systemic inflammatory response syndrome) (HCC) R65.10  Pleural effusion, left J90  
 Acute respiratory failure with hypoxia ([facility]) J96.01  
 Mantle cell lymphoma ([facility]) C83.10 DATE AND TIME OF DEATH: 11/9/2020 @ 08:30am  
 
CODE STATUS AT DISCHARGE: 
 Full Code  
x DNR Partial  
 Comfort Care DISCHARGE DIAGNOSES:  
# Acute cardio-pulmonary arrest 
# Acute hypoxic respiratory failure s/p intubation with mechanical ventilation. Now extubated. Comfortable on 2l NC. Comfort care since 11/7/2020 # Large malignant left pleural effusion s/p VATS with CT placement by thoracic surgery, nonresolving. Failed talc pleurodesis. No further pleurex catheter planned with transition to Comfort care # Symptomatic anemia secondary to mild blood loss at CT site, s/p PRBC transfusion # Dysphagia with high aspiration risk per ST # Stage IV mantle cell lymphoma # Acute on chronic thrombocytopenia, s/p platelets, due to malignancy # Splenomegaly with retroperitoneal adenopathy on CT A/P, concern for lymphoproliferative disorder Brief HPI and Hospital Course:   
 
Admission Summary:  
Delaney Dacosta is a 70 y. o. female with past medical history of left breast cancer, s/p radiation, and childhood asthma presented to the ED from home with chief complaint of SOB, leg swelling, and weight loss.  Symptoms reportedly have been gradual in onset, worsening, now severe, constant, with SOB, LOPEZ, orthopnea.  Patient reported no known chronic medical conditions.  There were no reports of recent sick contacts or exposure to anyone with COVID 19.  On arrival in the ED tonight, initial recorded vital signs were BP = 140/93, HR= 122, RR = 26, O2sat = 89% on room air.  Patient was initially placed on 2 lites O2 via nasal cannula (NC).  However per ED MD report, patient has becoming increasing dyspneic, with increased supplemental oxygen requirement.  Patient had no known prior lung disease.  CTA chest revealed a large left pleural effusion with left lung atelectasis.  Labs revealed WBC = 27.2, neutrophils = 2%, and absolute neutrophil count = 0.5.  Hemoglobin = 6.4 (with no comparison lab available for review).  ED offered PRBC transfusion but patient reportedly refused.  She was started on Levofloxacin 750 mg IV for antibiotic coverage.  Patient is now seen for admission to the hospitalist service. 
[facility] course: Pt was treated in ICU with mechanical ventilation. Extubated successfully and subsequently transferred to [facility] under hospitalist care. Pt was seen by Hemato-oncology, thoracic surgery, GI, and other therapy teams during the hospitalization along with intensivist and hospitalist teams as primary in ICU and non-ICU settings. Pt received chest tube for malignant pleural effusion and talc pleurodesis did not resolved it. Plan was to transition to [facility] catheter on 11/9/20, but with patient worsening and transition to Comfort care measures, it was cancelled.   
GI was consulted for PEG tube placement but it was also cancelled given change of goals of care ([address]) to comfort care only. Pt was initiated on chemotherapy but did not tolerate that well and further discussion regarding GOC was carried out by Oncology team over the weekend with family choosing comfort care only as further [address] after discussion among them. Pt was traansitioned to the same and peacefully and comfortably passed away on 11/9/2020 at 8:30am with son present at bedside. On exam, no heart sounds or breath sounds were noted after 1 minute of auscultation. Pupils were fixed and dilated without pupillary light reflex. Patient was pronounced dead on 11/9/2020  at 08:30am. 
 
Cause of Death: 
Immediate: Acute cardio-respiratory arrest 
 
Events related to death: 
Was code called: NO 
 notified: NO Family notified: Daryle Legacy Autopsy requested: NO 
Death certificate completed: NO 
Code Status Prior to Death: DNR  
 
PHYSICAL EXAMINATION AT DISCHARGE: 
GEN: No response to verbal or tactile stimuli HEENT: Pupils fixed, dilated CV: No cardiac activity or heart sounds appreciated. No carotid pulse. PULM: No respiratory effort or spontaneous respirations. Ext: No peripheral pulses. Death certification to be completed by Dr. Alexis Villar when ready to be completed and notification received from officials.  
 
Signed:  
Alexis Villar MD 
Date of Service:  11/9/2020 
8:52 AM

## 2020-11-09 NOTE — PROGRESS NOTES
Thoracic Surgery Simple Progress Note Admit Date: 10/30/2020 POD: 10 Days Post-Op Procedure:  Procedure(s): LEFT VATS PLEURAL BIOPSIES POSSIBLE TALC PLEURODESIS/ 
 
 
Subjective:  
 
Patient has complaints: sleeping I did not attempt to awaken her Objective:  
 
Blood pressure (!) 98/57, pulse (!) 115, temperature 97.6 °F (36.4 °C), resp. rate 16, height 5' (1.524 m), weight 109 lb 12.6 oz (49.8 kg), SpO2 96 %. Temp (24hrs), Av.2 °F (36.8 °C), Min:97.6 °F (36.4 °C), Max:98.7 °F (37.1 °C) Hemodynamics PAP 
  CO 
  CI No intake/output data recorded.  1901 -  0700 In: 561.7 [I.V.:561.7] Out: 1275 [[id number]] EXAM: 
GENERAL: afrible, hypotensive, HEART:  Tachycardic via BSM with rate 118 LUNG: Resp shallow on NC O2 
INCISION: Clean, dry, and intact GI/:  [place] Labs:No results found for this or any previous visit (from the past 24 hour(s)). Assessment:  
No evidence of DVT. Principal Problem: 
  Acute respiratory failure with hypoxia ([address] (10/30/2020) Active Problems: 
  Leukocytosis (10/30/2020) Neutropenia ([address] (10/30/2020) Anemia (10/30/2020) Tachycardia (10/30/2020) Thrombocytopenia ([address] (10/30/2020) SIRS (systemic inflammatory response syndrome) ([address] (10/30/2020) Pleural effusion, left (10/30/2020) Mantle cell lymphoma ([address] (2020) Plan/Recommendations:  
Transitioned to comfort care over the weekend We will be happy to see her again if needed See orders Signed By: Deysi CLINTON

## 2020-11-09 NOTE — DEATH NOTE
Death Pronouncement Note Events prior to patients death: 
Called to bedside at 8:53 AM for unresponsive and pulseless patient. On exam, no heart sounds or breath sounds were noted after 1 minute of auscultation. Pupils were fixed and dilated without pupillary light reflex. Patient was pronounced dead on 11/9/2020  at 08:30am. 
 
Date/Time of death: 11/9/2020 at 830am. 
 
 
Cause: 
Immediate: Acute cardiopulmonary arrest 
 
Underlying cause:  
Hospital Problems  Date Reviewed: 10/30/2020 Codes Class Noted POA Mantle cell lymphoma ([facility] ICD-10-CM: C83.10 ICD-9-CM: 200.40  11/5/2020 Unknown Leukocytosis ICD-10-CM: D15.899 ICD-9-CM: 288.60  10/30/2020 Unknown Neutropenia ([facility] ICD-10-CM: D70.9 ICD-9-CM: 288.00  10/30/2020 Unknown Anemia ICD-10-CM: D64.9 ICD-9-CM: 285.9  10/30/2020 Unknown Tachycardia ICD-10-CM: R00.0 ICD-9-CM: 785.0  10/30/2020 Unknown Thrombocytopenia ([facility] ICD-10-CM: D69.6 ICD-9-CM: 287.5  10/30/2020 Unknown SIRS (systemic inflammatory response syndrome) (HCC) ICD-10-CM: R65.10 ICD-9-CM: 995.90  10/30/2020 Unknown Pleural effusion, left ICD-10-CM: J90 ICD-9-CM: 511.9  10/30/2020 Unknown * (Principal) Acute respiratory failure with hypoxia ([facility] ICD-10-CM: J96.01 
ICD-9-CM: 518.81  10/30/2020 Yes Physician Pronouncing Death: Dru Peters MD 
 
 
Attending Physician of Record:   Asya Corado MD  
 
Events related to death: 
Was code called: NO 
 notified: NO Family notified: Handy Pruitt Autopsy requested: NO 
Death certificate completed: NO 
Code Status Prior to Death: DNR Discharge summary and death certificate will be completed by: Asya Corado MD 
 
Date of Service:  11/9/2020

## 2020-11-09 NOTE — PROGRESS NOTES
Notified of Ms Dacosta's death (room [id number]) by staff. Patient's son was curled up beside her in her bed when  arrived. He was tearful but spoke very little, answering questions with one word. Offered condolences and compassionate presence. With son's permission had prayer of commendation. Son said that he did not know which  home he wanted to handle the remains. Encouraged him to inform staff once his decision had been made. He denied any other needs at that time and said that he would like to spend some time alone with the . Assured him of ongoing  availability for support. : Rev. Ganga Kessler. [employer]; [facility], to contact [phone] [address] call: [phone]

## 2023-10-20 NOTE — PROGRESS NOTES
[place]: 
 
RUR 16% PleruX catheter placement on Monday. Patient's son is primary contact:Juan Del Valle [phone]. CM to follow for transitions of care planning. Leroy Mcmillan RN/CRM diabetic with foot swelling and tenderness and warmth concerning for deep space infection   called podiatrist on-call  an JANI and discussed my concerns of a deep space infection   labs sent x-ray for osteo pending dispo   patient and wife okay with plan diabetic with foot swelling and tenderness and warmth concerning for deep space infection   called podiatrist on-call  an AJNI and discussed my concerns of a deep space infection   labs sent x-ray for osteo pending dispo   patient and wife okay with plan

## (undated) DEVICE — SUTURE VCRL SZ 4-0 L27IN ABSRB UD L19MM PS-2 3/8 CIR PRIM J426H

## (undated) DEVICE — PAD,NON-ADHERENT,3X8,STERILE,LF,1/PK: Brand: MEDLINE

## (undated) DEVICE — GARMENT,MEDLINE,DVT,INT,CALF,MED, GEN2: Brand: MEDLINE

## (undated) DEVICE — TOWEL SURG W17XL27IN STD BLU COT NONFENESTRATED PREWASHED

## (undated) DEVICE — SUTURE VCRL SZ 3-0 L27IN ABSRB UD L26MM SH 1/2 CIR J416H

## (undated) DEVICE — SYR 10ML LUER LOK 1/5ML GRAD --

## (undated) DEVICE — NDL PRT INJ NSAF BLNT 18GX1.5 --

## (undated) DEVICE — CATHETER THORACENTESIS STR 28 FRX23 IN 6 EYELET TAPR TIP LF

## (undated) DEVICE — Device

## (undated) DEVICE — SOLUTION IV 1000ML 0.9% SOD CHL

## (undated) DEVICE — NEEDLE SPNL 22GA L3.5IN BLK HUB S STL REG WALL FIT STYL W/

## (undated) DEVICE — YANKAUER,BULB TIP,W/O VENT,RIGID,STERILE: Brand: MEDLINE

## (undated) DEVICE — STERILE POLYISOPRENE POWDER-FREE SURGICAL GLOVES WITH EMOLLIENT COATING: Brand: PROTEXIS

## (undated) DEVICE — TUBING, SUCTION, 1/4" X 10', STRAIGHT: Brand: MEDLINE

## (undated) DEVICE — SPONGE,DRAIN,NONWVN,4"X4",6PLY,STRL,LF: Brand: MEDLINE

## (undated) DEVICE — SOLUTION IRRIG 1000ML H2O STRL BLT

## (undated) DEVICE — REM POLYHESIVE ADULT PATIENT RETURN ELECTRODE: Brand: VALLEYLAB

## (undated) DEVICE — STRAP,POSITIONING,KNEE/BODY,FOAM,4X60": Brand: MEDLINE

## (undated) DEVICE — VISUALIZATION SYSTEM: Brand: CLEARIFY

## (undated) DEVICE — SURGICAL PROCEDURE KIT GEN LAPAROSCOPY LF

## (undated) DEVICE — BLADE ELECTRODE: Brand: EDGE

## (undated) DEVICE — TRAP,MUCUS SPECIMEN, 80CC: Brand: MEDLINE

## (undated) DEVICE — DERMABOND SKIN ADH 0.7ML -- DERMABOND ADVANCED 12/BX

## (undated) DEVICE — NEEDLE HYPO 22GA L1.5IN BLK S STL HUB POLYPR SHLD REG BVL

## (undated) DEVICE — MAGNETIC INSTR DRAPE 20X16: Brand: MEDLINE INDUSTRIES, INC.

## (undated) DEVICE — DRAPE,REIN 53X77,STERILE: Brand: MEDLINE

## (undated) DEVICE — PREP SKN CHLRAPRP APL 26ML STR --

## (undated) DEVICE — CONNECTOR TBNG WHT PLAS SUCT STR 5IN1 LTWT W/ M CONN

## (undated) DEVICE — SUTURE SZ 0 27IN 5/8 CIR UR-6  TAPER PT VIOLET ABSRB VICRYL J603H

## (undated) DEVICE — PROTECTOR E TIP CLR PLAS TB

## (undated) DEVICE — 3M™ IOBAN™ 2 ANTIMICROBIAL INCISE DRAPE 6648EZ: Brand: IOBAN™ 2

## (undated) DEVICE — INFECTION CONTROL KIT SYS

## (undated) DEVICE — CONTAINER,SPECIMEN,3OZ,OR STRL: Brand: MEDLINE

## (undated) DEVICE — SUTURE PERMA-HAND 0 L18IN NONABSORBABLE BLK CT-1 L36MM 1/2 C021D

## (undated) DEVICE — SPONGE GZ W4XL4IN COT 12 PLY TYP VII WVN C FLD DSGN

## (undated) DEVICE — POWDER MED TALCUM FOR TREAT OF MALIG PLEUR EFFUS

## (undated) DEVICE — SPONGE TONSIL MED X RAY DETECTABLE STRL LTX FREE

## (undated) DEVICE — PAD BD MATTRESS 73X32 IN STD CONVOLUTED FOAM LTX FREE

## (undated) DEVICE — HANDLE LT SNAP ON ULT DURABLE LENS FOR TRUMPF ALC DISPOSABLE